# Patient Record
Sex: FEMALE | Race: WHITE | NOT HISPANIC OR LATINO | Employment: OTHER | URBAN - METROPOLITAN AREA
[De-identification: names, ages, dates, MRNs, and addresses within clinical notes are randomized per-mention and may not be internally consistent; named-entity substitution may affect disease eponyms.]

---

## 2017-01-06 ENCOUNTER — TELEPHONE (OUTPATIENT)
Dept: MEDICAL GROUP | Facility: MEDICAL CENTER | Age: 67
End: 2017-01-06

## 2017-01-07 NOTE — TELEPHONE ENCOUNTER
VOICEMAIL  1. Caller Name: Janeen                      Call Back Number: [phone]    2. Message: Patient called and is unable to to afford the Pristq. The co-pay is over $1,000. She wanted to let you know.    3. Patient approves office to leave a detailed voicemail/MyChart message: N\A

## 2017-01-09 RX ORDER — VENLAFAXINE 75 MG/1
75 TABLET ORAL 2 TIMES DAILY
Qty: 60 TAB | Refills: 6 | Status: SHIPPED | OUTPATIENT
Start: 2017-01-09 | End: 2017-03-02

## 2017-01-11 ENCOUNTER — HOSPITAL ENCOUNTER (OUTPATIENT)
Dept: RADIOLOGY | Facility: MEDICAL CENTER | Age: 67
End: 2017-01-11
Attending: INTERNAL MEDICINE
Payer: MEDICARE

## 2017-01-11 DIAGNOSIS — Z78.0 POST-MENOPAUSE: ICD-10-CM

## 2017-01-11 PROCEDURE — 77080 DXA BONE DENSITY AXIAL: CPT

## 2017-02-06 DIAGNOSIS — G10 HUNTINGTON CHOREA (HCC): Chronic | ICD-10-CM

## 2017-02-06 RX ORDER — HYDROXYZINE PAMOATE 50 MG/1
50 CAPSULE ORAL 2 TIMES DAILY
Qty: 180 CAP | Refills: 3 | Status: SHIPPED | OUTPATIENT
Start: 2017-02-06 | End: 2017-02-07

## 2017-02-07 RX ORDER — HYDROXYZINE 50 MG/1
50 TABLET, FILM COATED ORAL 3 TIMES DAILY PRN
Qty: 180 TAB | Refills: 3 | Status: SHIPPED | OUTPATIENT
Start: 2017-02-07 | End: 2017-06-30 | Stop reason: SDUPTHER

## 2017-02-09 ENCOUNTER — TELEPHONE (OUTPATIENT)
Dept: MEDICAL GROUP | Facility: MEDICAL CENTER | Age: 67
End: 2017-02-09

## 2017-02-09 NOTE — TELEPHONE ENCOUNTER
Phone Number Called: [phone] (home)     Message: patient left voice regarding a medical question but didn't state exactly    Left Message for patient to call back: yes

## 2017-02-13 ENCOUNTER — APPOINTMENT (OUTPATIENT)
Dept: BEHAVIORAL HEALTH | Facility: PHYSICIAN GROUP | Age: 67
End: 2017-02-13
Payer: MEDICARE

## 2017-02-16 ENCOUNTER — TELEPHONE (OUTPATIENT)
Dept: MEDICAL GROUP | Facility: MEDICAL CENTER | Age: 67
End: 2017-02-16

## 2017-02-16 NOTE — TELEPHONE ENCOUNTER
1. Caller Name: Janeen Ritter                                         Call Back Number: [phone] (home)       Patient approves a detailed voicemail message: yes    Pt is having a hard time swallowing atorvastatin 80 mg, she wants to know if you will change it. She also cant break or cute them they are to chalky

## 2017-03-02 ENCOUNTER — OFFICE VISIT (OUTPATIENT)
Dept: MEDICAL GROUP | Facility: MEDICAL CENTER | Age: 67
End: 2017-03-02
Payer: MEDICARE

## 2017-03-02 VITALS
HEART RATE: 87 BPM | WEIGHT: 131 LBS | RESPIRATION RATE: 16 BRPM | SYSTOLIC BLOOD PRESSURE: 132 MMHG | TEMPERATURE: 97.3 F | BODY MASS INDEX: 24.73 KG/M2 | HEIGHT: 61 IN | DIASTOLIC BLOOD PRESSURE: 68 MMHG | OXYGEN SATURATION: 97 %

## 2017-03-02 DIAGNOSIS — R73.02 IGT (IMPAIRED GLUCOSE TOLERANCE): ICD-10-CM

## 2017-03-02 DIAGNOSIS — N39.46 MIXED INCONTINENCE: ICD-10-CM

## 2017-03-02 DIAGNOSIS — R23.2 HOT FLASHES: ICD-10-CM

## 2017-03-02 PROCEDURE — 3288F FALL RISK ASSESSMENT DOCD: CPT | Performed by: INTERNAL MEDICINE

## 2017-03-02 PROCEDURE — G8420 CALC BMI NORM PARAMETERS: HCPCS | Performed by: INTERNAL MEDICINE

## 2017-03-02 PROCEDURE — 0518F FALL PLAN OF CARE DOCD: CPT | Mod: 8P | Performed by: INTERNAL MEDICINE

## 2017-03-02 PROCEDURE — G8432 DEP SCR NOT DOC, RNG: HCPCS | Performed by: INTERNAL MEDICINE

## 2017-03-02 PROCEDURE — 1100F PTFALLS ASSESS-DOCD GE2>/YR: CPT | Performed by: INTERNAL MEDICINE

## 2017-03-02 PROCEDURE — 3017F COLORECTAL CA SCREEN DOC REV: CPT | Performed by: INTERNAL MEDICINE

## 2017-03-02 PROCEDURE — 1036F TOBACCO NON-USER: CPT | Performed by: INTERNAL MEDICINE

## 2017-03-02 PROCEDURE — 99214 OFFICE O/P EST MOD 30 MIN: CPT | Performed by: INTERNAL MEDICINE

## 2017-03-02 PROCEDURE — G8482 FLU IMMUNIZE ORDER/ADMIN: HCPCS | Performed by: INTERNAL MEDICINE

## 2017-03-02 PROCEDURE — 3014F SCREEN MAMMO DOC REV: CPT | Performed by: INTERNAL MEDICINE

## 2017-03-02 PROCEDURE — 4040F PNEUMOC VAC/ADMIN/RCVD: CPT | Performed by: INTERNAL MEDICINE

## 2017-03-02 RX ORDER — ESTRADIOL 0.5 MG/1
0.5 TABLET ORAL DAILY
Qty: 30 TAB | Refills: 6 | Status: SHIPPED | OUTPATIENT
Start: 2017-03-02 | End: 2017-03-03 | Stop reason: SDUPTHER

## 2017-03-02 RX ORDER — OXYBUTYNIN CHLORIDE 5 MG/1
5 TABLET ORAL 3 TIMES DAILY
Qty: 270 TAB | Refills: 3 | Status: SHIPPED | OUTPATIENT
Start: 2017-03-02 | End: 2017-12-20

## 2017-03-02 NOTE — PROGRESS NOTES
CC: Follow-up multiple issues    HPI:   Janeen presents today with the following.    1. Hot flashes  Maintain on Jessy Garcia for some time for hot flashes with good control however insurance no longer paying for it. She was tried on multiple other agents which were either unaffordable or not helpful. She is interested in going back to Demetrice Garcia    2. Mixed incontinence  She is having more problems with incontinence. She reports the oxybutynin was helpful initially started twice a day but she is now having recurrent symptoms. She has no pain with urination but has sudden urgency to go and cannot make it to the restroom in time.    3. IGT (impaired glucose tolerance)  History of impaired glucose tolerance weight has gone up slightly not currently due for blood work.      Patient Active Problem List    Diagnosis Date Noted   • [place] chorea (CMS-HCC) 01/11/2010     Priority: High   • Chronic kidney disease, stage III (moderate) 04/16/2015     Priority: Medium   • Hot flashes 03/02/2017   • Mixed incontinence 03/02/2017   • Recurrent major depressive disorder, in partial remission (CMS-HCC) 12/30/2016   • Asthma in adult 12/30/2016   • IGT (impaired glucose tolerance) 02/22/2013   • GERD (gastroesophageal reflux disease) 01/11/2010   • Dyslipidemia 01/11/2010   • HTN (hypertension), benign 01/11/2010   • Glaucoma 01/11/2010       Current Outpatient Prescriptions   Medication Sig Dispense Refill   • estradiol (ESTRACE) 0.5 MG tablet Take 1 Tab by mouth every day. 30 Tab 6   • oxybutynin (DITROPAN) 5 MG Tab Take 1 Tab by mouth 3 times a day. 270 Tab 3   • hydrOXYzine (ATARAX) 50 MG Tab Take 1 Tab by mouth 3 times a day as needed for Itching. 180 Tab 3   • atorvastatin (LIPITOR) 80 MG tablet Take 1 Tab by mouth every evening. 90 Tab 3   • omeprazole (PRILOSEC) 40 MG delayed-release capsule Take 1 Cap by mouth every day. 90 Cap 3   • hydrocodone/acetaminophen (NORCO)  MG Tab Take 1 Tab by mouth every 12 hours  "as needed. 60 Tab 0   • gabapentin (NEURONTIN) 300 MG Cap Take 1 Cap by mouth 3 times a day. 270 Cap 3   • cyclobenzaprine (FLEXERIL) 10 MG Tab Take 1 Tab by mouth 3 times a day as needed. 90 Tab 3   • oxybutynin (DITROPAN) 5 MG Tab Take 1 Tab by mouth 2 Times a Day. TWICE DAILY 180 Tab 3   • fluticasone (FLONASE) 50 MCG/ACT nasal spray Spray 1 Spray in nose every day. 16 g 11   • trazodone (DESYREL) 50 MG Tab Take 1 Tab by mouth every bedtime. 90 Tab 3   • atorvastatin (LIPITOR) 20 MG TABS TAKE ONE TABLET BY MOUTH IN THE EVENING (Patient not taking: Reported on 12/30/2016) 90 Tab 0   • PROAIR  (90 BASE) MCG/ACT AERS inhalation aerosol Inhale 2 Puffs by mouth every 6 hours as needed for Shortness of Breath. 3 Inhaler 3   • docosahexanoic acid (OMEGA 3 FA) 1000 MG CAPS Take 1,000 mg by mouth 3 times a day, with meals.     • vitamin D, Ergocalciferol, (DRISDOL) 61801 UNIT CAPS capsule Take 1 Cap by mouth every 7 days. 12 Cap 3   • aspirin 81 MG tablet Take 81 mg by mouth every day.     • Calcium Carbonate-Vitamin D (CALTRATE 600+D PO) Take  by mouth every day.     • Latanoprost (XALATAN OP) by Ophthalmic route.       No current facility-administered medications for this visit.         Allergies as of 03/02/2017 - Claudio as Reviewed 03/02/2017   Allergen Reaction Noted   • Other misc Shortness of Breath 12/03/2010   • Soap Rash 12/03/2010        ROS: As per HPI.    /68 mmHg  Pulse 87  Temp(Src) 36.3 °C (97.3 °F)  Resp 16  Ht 1.549 m (5' 1\")  Wt 59.421 kg (131 lb)  BMI 24.76 kg/m2  SpO2 97%    Physical Exam:  Gen:         Alert and oriented, No apparent distress.  Neck:        No Lymphadenopathy or Bruits.  Lungs:     Clear to auscultation bilaterally  CV:          Regular rate and rhythm. No murmurs, rubs or gallops.               Ext:          No clubbing, cyanosis, edema.      Assessment and Plan.   66 y.o. female with the following issues.    1. Hot flashes  Have written for estrogenic at low dose " we'll try and get prior authorization as her hot flashes are severely life limiting.    2. Mixed incontinence  Increasing oxybutynin to 3 times a day if not improving symptoms will refer to urology.  - oxybutynin (DITROPAN) 5 MG Tab; Take 1 Tab by mouth 3 times a day.  Dispense: 270 Tab; Refill: 3    3. IGT (impaired glucose tolerance)  Discussed diet exercise and recheck blood work in 3 months.

## 2017-03-02 NOTE — MR AVS SNAPSHOT
"        Janeen Ritter   3/2/2017 3:00 PM   Office Visit   MRN: [id number]    Department:  [address] [place] [address]   Dept Phone:  [phone]    Description:  Female : 1950   Provider:  Nico Garcia M.D.           Reason for Visit     Follow-Up Labs      Allergies as of 3/2/2017     Allergen Noted Reactions    Other Misc 2010   Shortness of Breath    \"chemicals such as cleaning agents and fragrance\"    Soap 2010   Rash      You were diagnosed with     Hot flashes   [732628]       Mixed incontinence   [880302]       IGT (impaired glucose tolerance)   [799814]         Vital Signs     Blood Pressure Pulse Temperature Respirations Height Weight    132/68 mmHg 87 36.3 °C (97.3 °F) 16 1.549 m (5' 1\") 59.421 kg (131 lb)    Body Mass Index Oxygen Saturation Smoking Status             24.76 kg/m2 97% Never Smoker          Basic Information     Date Of Birth Sex Race Ethnicity Preferred Language    1950 Female White Non- English      Your appointments     2017  3:20 PM   Established Patient with Nico Garcia M.D.   [facility] [address]    [address]  [address]  [facility] [id number] [phone]           You will be receiving a confirmation call a few days before your appointment from our automated call confirmation system.              Problem List              ICD-10-CM Priority Class Noted - Resolved    GERD (gastroesophageal reflux disease) (Chronic) K21.9   2010 - Present    Dyslipidemia (Chronic) E78.5   2010 - Present    HTN (hypertension), benign (Chronic) I10   2010 - Present    Vale chorea (CMS-HCC) (Chronic) G10 High  2010 - Present    Glaucoma H40.9   2010 - Present    IGT (impaired glucose tolerance) R73.02   2013 - Present    Chronic kidney disease, stage III (moderate) N18.3 Medium  2015 - Present    Recurrent major depressive disorder, in partial remission (CMS-HCC) F33.41   2016 - " Present    Asthma in adult J45.909   12/30/2016 - Present    Hot flashes R23.2   3/2/2017 - Present    Mixed incontinence N39.46   3/2/2017 - Present      Health Maintenance        Date Due Completion Dates    IMM ZOSTER VACCINE 7/18/2011 ---    IMM PNEUMOCOCCAL 65+ (ADULT) LOW/MEDIUM RISK SERIES (1 of 2 - PCV13) 4/6/2017 4/6/2016    MAMMOGRAM 4/15/2017 4/15/2016, 1/3/2014, 11/8/2011, 11/8/2011 (Done), 1/18/2010, 1/18/2010    Override on 11/8/2011: Done    COLONOSCOPY 10/1/2017 10/1/2012 (Done)    Override on 10/1/2012: Done    BONE DENSITY 1/11/2022 1/11/2017, 11/8/2011    IMM DTaP/Tdap/Td Vaccine (3 - Td) 11/2/2026 11/2/2016, 8/27/2012            Current Immunizations     INFLUENZA VACCINE H1N1 10/27/2009    Influenza TIV (IM) 10/27/2011, 10/22/2007, 12/5/2006, 10/24/2005    Influenza Vaccine Adult HD 11/2/2016    Influenza Vaccine Quad Inj (Pf) 10/3/2014, 10/7/2013, 9/26/2011, 10/6/2010    Influenza Vaccine Quad Inj (Preserved) 10/7/2015  4:00 PM, 10/3/2014    Pneumococcal polysaccharide vaccine (PPSV-23) 4/6/2016    Tdap Vaccine 11/2/2016, 8/27/2012  1:55 PM      Below and/or attached are the medications your provider expects you to take. Review all of your home medications and newly ordered medications with your provider and/or pharmacist. Follow medication instructions as directed by your provider and/or pharmacist. Please keep your medication list with you and share with your provider. Update the information when medications are discontinued, doses are changed, or new medications (including over-the-counter products) are added; and carry medication information at all times in the event of emergency situations     Allergies:  OTHER MISC - Shortness of Breath     SOAP - Rash               Medications  Valid as of: March 02, 2017 -  3:39 PM    Generic Name Brand Name Tablet Size Instructions for use    Albuterol Sulfate (Aero Soln) PROAIR  (90 BASE) MCG/ACT Inhale 2 Puffs by mouth every 6 hours as needed  for Shortness of Breath.        Aspirin (Tab) aspirin 81 MG Take 81 mg by mouth every day.        Atorvastatin Calcium (Tab) LIPITOR 20 MG TAKE ONE TABLET BY MOUTH IN THE EVENING        Atorvastatin Calcium (Tab) LIPITOR 80 MG Take 1 Tab by mouth every evening.        Calcium Carbonate-Vitamin D   Take  by mouth every day.        Cyclobenzaprine HCl (Tab) FLEXERIL 10 MG Take 1 Tab by mouth 3 times a day as needed.        Ergocalciferol (Cap) DRISDOL 80730 UNITS Take 1 Cap by mouth every 7 days.        Estradiol (Tab) ESTRACE 0.5 MG Take 1 Tab by mouth every day.        Fluticasone Propionate (Suspension) FLONASE 50 MCG/ACT Spray 1 Spray in nose every day.        Gabapentin (Cap) NEURONTIN 300 MG Take 1 Cap by mouth 3 times a day.        Hydrocodone-Acetaminophen (Tab) NORCO  MG Take 1 Tab by mouth every 12 hours as needed.        HydrOXYzine HCl (Tab) ATARAX 50 MG Take 1 Tab by mouth 3 times a day as needed for Itching.        Latanoprost   by Ophthalmic route.        Omega-3 Fatty Acids (Cap) OMEGA 3 FA 1000 MG Take 1,000 mg by mouth 3 times a day, with meals.        Omeprazole (CAPSULE DELAYED RELEASE) PRILOSEC 40 MG Take 1 Cap by mouth every day.        Oxybutynin Chloride (Tab) DITROPAN 5 MG Take 1 Tab by mouth 2 Times a Day. TWICE DAILY        Oxybutynin Chloride (Tab) DITROPAN 5 MG Take 1 Tab by mouth 3 times a day.        TraZODone HCl (Tab) DESYREL 50 MG Take 1 Tab by mouth every bedtime.        .                 Medicines prescribed today were sent to:     [facility] [address] [place] [address] [address]     [address] [facility] [address]    Phone: [phone] Fax: [phone]    Open 24 Hours?: No      Medication refill instructions:       If your prescription bottle indicates you have medication refills left, it is not necessary to call your provider’s office. Please contact your pharmacy and they will refill your medication.    If your prescription bottle indicates you do not have any refills left,  you may request refills at any time through one of the following ways: The online Catbird system (except Urgent Care), by calling your provider’s office, or by asking your pharmacy to contact your provider’s office with a refill request. Medication refills are processed only during regular business hours and may not be available until the next business day. Your provider may request additional information or to have a follow-up visit with you prior to refilling your medication.   *Please Note: Medication refills are assigned a new Rx number when refilled electronically. Your pharmacy may indicate that no refills were authorized even though a new prescription for the same medication is available at the pharmacy. Please request the medicine by name with the pharmacy before contacting your provider for a refill.        Other Notes About Your Plan     Patient Enrolled in [facility] with Dr.John Garcia.            Catbird Access Code: [email or web address]  Expires: 4/1/2017  3:39 PM    Catbird  A secure, online tool to manage your health information     [employer]’s Catbird® is a secure, online tool that connects you to your personalized health information from the privacy of your home -- day or night - making it very easy for you to manage your healthcare. Once the activation process is completed, you can even access your medical information using the Catbird aidee, which is available for free in the Apple Aidee store or Google Play store.     Catbird provides the following levels of access (as shown below):   My Chart Features   [facility] Primary Care Doctor [facility]  Specialists [facility]  Urgent  Care Non-[facility]  Primary Care  Doctor   Email your healthcare team securely and privately 24/7 X X X    Manage appointments: schedule your next appointment; view details of past/upcoming appointments X      Request prescription refills. X      View recent personal medical records, including lab and immunizations X X X X   View  health record, including health history, allergies, medications X X X X   Read reports about your outpatient visits, procedures, consult and ER notes X X X X   See your discharge summary, which is a recap of your hospital and/or ER visit that includes your diagnosis, lab results, and care plan. X X       How to register for Incentive Targeting:  1. Go to  [email or web address].  2. Click on the Sign Up Now box, which takes you to the New Member Sign Up page. You will need to provide the following information:  a. Enter your Incentive Targeting Access Code exactly as it appears at the top of this page. (You will not need to use this code after you’ve completed the sign-up process. If you do not sign up before the expiration date, you must request a new code.)   b. Enter your date of birth.   c. Enter your home email address.   d. Click Submit, and follow the next screen’s instructions.  3. Create a Incentive Targeting ID. This will be your Incentive Targeting login ID and cannot be changed, so think of one that is secure and easy to remember.  4. Create a [email or web address] password. You can change your password at any time.  5. Enter your Password Reset Question and Answer. This can be used at a later time if you forget your password.   6. Enter your e-mail address. This allows you to receive e-mail notifications when new information is available in Incentive Targeting.  7. Click Sign Up. You can now view your health information.    For assistance activating your Incentive Targeting account, call [phone]

## 2017-03-03 RX ORDER — ESTRADIOL 0.5 MG/1
0.5 TABLET ORAL DAILY
Qty: 30 TAB | Refills: 6 | Status: SHIPPED | OUTPATIENT
Start: 2017-03-03 | End: 2017-12-20 | Stop reason: SDUPTHER

## 2017-03-20 ENCOUNTER — TELEPHONE (OUTPATIENT)
Dept: MEDICAL GROUP | Facility: MEDICAL CENTER | Age: 67
End: 2017-03-20

## 2017-03-20 NOTE — TELEPHONE ENCOUNTER
1. Caller Name: Janeen Ritter                                           Call Back Number: [phone] (home)         Patient approves a detailed voicemail message: yes    Pt wants to stop Venlafaxine all together she is down to 1/2 75 mg a day.  Should she take 1/2 every other day for a week and stop.

## 2017-05-24 ENCOUNTER — TELEPHONE (OUTPATIENT)
Dept: MEDICAL GROUP | Facility: MEDICAL CENTER | Age: 67
End: 2017-05-24

## 2017-05-24 NOTE — TELEPHONE ENCOUNTER
Future Appointments       Provider Department Center    6/2/2017 3:20 PM Nico Garcia M.D. [facility] [address]        ESTABLISHED PATIENT PRE-VISIT PLANNING     Note: Patient will not be contacted if there is no indication to call.     1.  Reviewed note from last office visit with PCP and/or other med group provider: Yes    2.  If any orders were placed at last visit, do we have Results/Consult Notes?        •  Labs - Labs were not ordered at last office visit.       •  Imaging - Imaging was not ordered at last office visit.       •  Referrals - No referrals were ordered at last office visit.    3.  Immunizations were updated in [facility] using WebIZ?: Yes       •  Web Iz Recommendations: HEPATITIS A  HEPATITIS B PREVNAR (PCV13)  TD VARICELLA (Chicken Pox)  ZOSTAVAX (Shingles)    4.  Patient is due for the following Health Maintenance Topics:   Health Maintenance   Topic Date Due   • Zoster (Shingles) Vaccine  07/18/2011   • Pneumonia Vaccine (1 of 2 - PCV13) 04/06/2017   • Mammogram  04/15/2017   • Colonoscopy  10/01/2017   • Annual Wellness Visit  12/31/2017   • Bone Density Study (DEXA)  01/11/2022   • Diphtheria, Tetanus, Pertussis Immunization (DTaP/Tdap/Td) (3 - Td) 11/02/2026   • Flu Shot  Completed           5.  Patient was not informed to arrive 15 min prior to their scheduled appointment and bring in their medication bottles.

## 2017-06-02 ENCOUNTER — OFFICE VISIT (OUTPATIENT)
Dept: MEDICAL GROUP | Facility: MEDICAL CENTER | Age: 67
End: 2017-06-02
Payer: MEDICARE

## 2017-06-02 VITALS
TEMPERATURE: 97.3 F | HEIGHT: 61 IN | OXYGEN SATURATION: 92 % | BODY MASS INDEX: 23.6 KG/M2 | SYSTOLIC BLOOD PRESSURE: 136 MMHG | RESPIRATION RATE: 16 BRPM | HEART RATE: 82 BPM | WEIGHT: 125 LBS | DIASTOLIC BLOOD PRESSURE: 78 MMHG

## 2017-06-02 DIAGNOSIS — E78.5 DYSLIPIDEMIA: Chronic | ICD-10-CM

## 2017-06-02 DIAGNOSIS — Z12.31 VISIT FOR SCREENING MAMMOGRAM: ICD-10-CM

## 2017-06-02 DIAGNOSIS — F41.9 ANXIETY: ICD-10-CM

## 2017-06-02 DIAGNOSIS — Z23 NEED FOR PNEUMOCOCCAL VACCINE: ICD-10-CM

## 2017-06-02 DIAGNOSIS — I10 HTN (HYPERTENSION), BENIGN: Chronic | ICD-10-CM

## 2017-06-02 DIAGNOSIS — R49.0 HOARSE VOICE QUALITY: ICD-10-CM

## 2017-06-02 DIAGNOSIS — G89.29 CHRONIC LEFT-SIDED LOW BACK PAIN WITH LEFT-SIDED SCIATICA: ICD-10-CM

## 2017-06-02 DIAGNOSIS — R73.02 IGT (IMPAIRED GLUCOSE TOLERANCE): ICD-10-CM

## 2017-06-02 DIAGNOSIS — M54.42 CHRONIC LEFT-SIDED LOW BACK PAIN WITH LEFT-SIDED SCIATICA: ICD-10-CM

## 2017-06-02 PROCEDURE — 90670 PCV13 VACCINE IM: CPT | Performed by: INTERNAL MEDICINE

## 2017-06-02 PROCEDURE — G0009 ADMIN PNEUMOCOCCAL VACCINE: HCPCS | Performed by: INTERNAL MEDICINE

## 2017-06-02 PROCEDURE — G8432 DEP SCR NOT DOC, RNG: HCPCS | Performed by: INTERNAL MEDICINE

## 2017-06-02 PROCEDURE — 0518F FALL PLAN OF CARE DOCD: CPT | Mod: 8P | Performed by: INTERNAL MEDICINE

## 2017-06-02 PROCEDURE — 3014F SCREEN MAMMO DOC REV: CPT | Performed by: INTERNAL MEDICINE

## 2017-06-02 PROCEDURE — 1036F TOBACCO NON-USER: CPT | Performed by: INTERNAL MEDICINE

## 2017-06-02 PROCEDURE — 3288F FALL RISK ASSESSMENT DOCD: CPT | Performed by: INTERNAL MEDICINE

## 2017-06-02 PROCEDURE — 1100F PTFALLS ASSESS-DOCD GE2>/YR: CPT | Performed by: INTERNAL MEDICINE

## 2017-06-02 PROCEDURE — 99214 OFFICE O/P EST MOD 30 MIN: CPT | Mod: 25 | Performed by: INTERNAL MEDICINE

## 2017-06-02 PROCEDURE — 4040F PNEUMOC VAC/ADMIN/RCVD: CPT | Performed by: INTERNAL MEDICINE

## 2017-06-02 PROCEDURE — G8420 CALC BMI NORM PARAMETERS: HCPCS | Performed by: INTERNAL MEDICINE

## 2017-06-02 RX ORDER — SERTRALINE HYDROCHLORIDE 25 MG/1
25 TABLET, FILM COATED ORAL DAILY
Qty: 30 TAB | Refills: 11 | Status: SHIPPED | OUTPATIENT
Start: 2017-06-02 | End: 2017-11-15 | Stop reason: SDUPTHER

## 2017-06-02 NOTE — PROGRESS NOTES
CC: Follow-up multiple issues    HPI:   Janeen presents today with the following.    1. Hoarse voice quality  Presents complaining of persistent hoarse voice. She is being treated for acid reflux which she is reporting that is well controlled as well as allergies. She does not remember to take herconsistently however does have some issues. Been going on for several months and there is no other infectious symptom already.    2. Dyslipidemia  LDL significantly elevated 180 despite statin she is taking it more regularly at the full dose due for recheck.    3. IGT (impaired glucose tolerance)  Blood sugars well controlled as of last check. She is coming due for repeat A1c.    4. HTN (hypertension), benign  Blood pressure well controlled denying chest pain or shortness of breath.    5. Anxiety  She was tried on Effexor to try and help with hot flashes and anxiety reporting some spaciness with the Effexor. She is almost off the medication completely would like to go on Zoloft which is taken before in the past.    6. Need for pneumococcal vaccine      7. Visit for screening mammogram      8. Chronic left-sided low back pain with left-sided sciatica  She is complaining of recurrence of low back pain. She had received injections by physiatry in the past like to return. She still has 20 more cold left that she was given last September 60 pills. She denies any loss of bowel or bladder no weakness or lower extremity.      Patient Active Problem List    Diagnosis Date Noted   • [place] chorea (CMS-HCC) 01/11/2010     Priority: High   • Chronic kidney disease, stage III (moderate) 04/16/2015     Priority: Medium   • Hot flashes 03/02/2017   • Mixed incontinence 03/02/2017   • Recurrent major depressive disorder, in partial remission (CMS-HCC) 12/30/2016   • Asthma in adult 12/30/2016   • IGT (impaired glucose tolerance) 02/22/2013   • GERD (gastroesophageal reflux disease) 01/11/2010   • Dyslipidemia 01/11/2010   • HTN  (hypertension), benign 01/11/2010   • Glaucoma 01/11/2010       Current Outpatient Prescriptions   Medication Sig Dispense Refill   • sertraline (ZOLOFT) 25 MG tablet Take 1 Tab by mouth every day. 30 Tab 11   • estradiol (ESTRACE) 0.5 MG tablet Take 1 Tab by mouth every day. 30 Tab 6   • oxybutynin (DITROPAN) 5 MG Tab Take 1 Tab by mouth 3 times a day. 270 Tab 3   • hydrOXYzine (ATARAX) 50 MG Tab Take 1 Tab by mouth 3 times a day as needed for Itching. 180 Tab 3   • atorvastatin (LIPITOR) 80 MG tablet Take 1 Tab by mouth every evening. 90 Tab 3   • omeprazole (PRILOSEC) 40 MG delayed-release capsule Take 1 Cap by mouth every day. 90 Cap 3   • gabapentin (NEURONTIN) 300 MG Cap Take 1 Cap by mouth 3 times a day. 270 Cap 3   • cyclobenzaprine (FLEXERIL) 10 MG Tab Take 1 Tab by mouth 3 times a day as needed. 90 Tab 3   • oxybutynin (DITROPAN) 5 MG Tab Take 1 Tab by mouth 2 Times a Day. TWICE DAILY 180 Tab 3   • fluticasone (FLONASE) 50 MCG/ACT nasal spray Spray 1 Spray in nose every day. 16 g 11   • trazodone (DESYREL) 50 MG Tab Take 1 Tab by mouth every bedtime. 90 Tab 3   • atorvastatin (LIPITOR) 20 MG TABS TAKE ONE TABLET BY MOUTH IN THE EVENING (Patient not taking: Reported on 12/30/2016) 90 Tab 0   • PROAIR  (90 BASE) MCG/ACT AERS inhalation aerosol Inhale 2 Puffs by mouth every 6 hours as needed for Shortness of Breath. 3 Inhaler 3   • docosahexanoic acid (OMEGA 3 FA) 1000 MG CAPS Take 1,000 mg by mouth 3 times a day, with meals.     • vitamin D, Ergocalciferol, (DRISDOL) 10420 UNIT CAPS capsule Take 1 Cap by mouth every 7 days. 12 Cap 3   • aspirin 81 MG tablet Take 81 mg by mouth every day.     • Calcium Carbonate-Vitamin D (CALTRATE 600+D PO) Take  by mouth every day.     • Latanoprost (XALATAN OP) by Ophthalmic route.       No current facility-administered medications for this visit.         Allergies as of 06/02/2017 - Claudio as Reviewed 06/02/2017   Allergen Reaction Noted   • Other misc Shortness of  "Breath 12/03/2010   • Soap Rash 12/03/2010        ROS: As per HPI.    /78 mmHg  Pulse 82  Temp(Src) 36.3 °C (97.3 °F)  Resp 16  Ht 1.549 m (5' 1\")  Wt 56.7 kg (125 lb)  BMI 23.63 kg/m2  SpO2 92%    Physical Exam:  Gen:         Alert and oriented, No apparent distress.  Heent:       TMs clear bilaterally, oropharynx without erythema or exudates  Neck:        No Lymphadenopathy or Bruits.  Lungs:     Clear to auscultation bilaterally  CV:          Regular rate and rhythm. No murmurs, rubs or gallops.               Ext:          No clubbing, cyanosis, edema.      Assessment and Plan.   66 y.o. female with the following issues.    1. Hoarse voice quality  Have referred to ENT given the persistence of symptoms and slight worsening.  - REFERRAL TO ENT    2. Dyslipidemia  Recheck cholesterol  - COMP METABOLIC PANEL; Future  - LIPID PROFILE; Future    3. IGT (impaired glucose tolerance)  Recheck blood sugar  - HEMOGLOBIN A1C; Future    4. HTN (hypertension), benign  Currently well controlled, Discuss diet, exercise and salt restriction.    5. Anxiety  We'll discontinue Effexor transitioning to low-dose Zoloft will see back in 4-8 weeks.  - sertraline (ZOLOFT) 25 MG tablet; Take 1 Tab by mouth every day.  Dispense: 30 Tab; Refill: 11    6. Need for pneumococcal vaccine    - PNEUMOCOCCAL CONJUGATE VACCINE 13-VALENT    7. Visit for screening mammogram    - MA-SCREEN MAMMO W/CAD-BILAT; Future    8. Chronic left-sided low back pain with left-sided sciatica  Referral back to physiatry for possible injections.  - REFERRAL TO PAIN CLINIC        "

## 2017-06-30 RX ORDER — FLUTICASONE PROPIONATE 50 MCG
1 SPRAY, SUSPENSION (ML) NASAL DAILY
Qty: 16 G | Refills: 11 | Status: SHIPPED | OUTPATIENT
Start: 2017-06-30 | End: 2019-07-12

## 2017-06-30 RX ORDER — OXYBUTYNIN CHLORIDE 5 MG/1
TABLET ORAL
Qty: 180 TAB | Refills: 3 | Status: SHIPPED | OUTPATIENT
Start: 2017-06-30 | End: 2017-12-20 | Stop reason: SDUPTHER

## 2017-06-30 RX ORDER — HYDROXYZINE 50 MG/1
50 TABLET, FILM COATED ORAL 3 TIMES DAILY PRN
Qty: 180 TAB | Refills: 3 | Status: SHIPPED | OUTPATIENT
Start: 2017-06-30 | End: 2017-12-20

## 2017-06-30 RX ORDER — TRAZODONE HYDROCHLORIDE 50 MG/1
TABLET ORAL
Qty: 90 TAB | Refills: 3 | Status: SHIPPED | OUTPATIENT
Start: 2017-06-30 | End: 2017-12-20 | Stop reason: SDUPTHER

## 2017-06-30 RX ORDER — NITROFURANTOIN 25; 75 MG/1; MG/1
CAPSULE ORAL
Qty: 10 CAP | Refills: 3 | Status: SHIPPED | OUTPATIENT
Start: 2017-06-30 | End: 2017-12-20

## 2017-07-07 ENCOUNTER — TELEPHONE (OUTPATIENT)
Dept: MEDICAL GROUP | Facility: MEDICAL CENTER | Age: 67
End: 2017-07-07

## 2017-07-07 NOTE — TELEPHONE ENCOUNTER
Future Appointments       Provider Department Center    7/14/2017 4:00 PM Nico Garcia M.D. [facility] [address]        ESTABLISHED PATIENT PRE-VISIT PLANNING     Note: Patient will not be contacted if there is no indication to call.     1.  Reviewed note from last office visit with PCP and/or other med group provider: Yes    2.  If any orders were placed at last visit, do we have Results/Consult Notes?        •  Labs - Labs ordered, NOT completed. Patient advised to complete prior to next appointment.       •  Imaging - Imaging ordered, NOT completed. Patient advised to complete prior to next appointment.VIA VOICEMAIL       •  Referrals - No referrals were ordered at last office visit.    3.  Immunizations were updated in Epic using WebIZ?: Yes       •  Web Iz Recommendations: HEPATITIS A  TD ZOSTAVAX (Shingles)    4.  Patient is due for the following Health Maintenance Topics:   Health Maintenance Due   Topic Date Due   • IMM ZOSTER VACCINE  07/18/2011   • MAMMOGRAM  04/15/2017           5.  Patient was informed VIA VOICEMAIL to arrive 15 min prior to their scheduled appointment and bring in their medication bottles.

## 2017-07-20 ENCOUNTER — TELEPHONE (OUTPATIENT)
Dept: MEDICAL GROUP | Facility: MEDICAL CENTER | Age: 67
End: 2017-07-20

## 2017-07-20 NOTE — Clinical Note
[facility]  Nico Garcia M.D.  [address]  [place] [address]  Fax: [phone]   Authorization for Release/Disclosure of   Protected Health Information   Name: MAMIE PANDA : 1950 SSN: [id number]   Address: [address] [address] Phone:    [phone] (home)    I authorize the entity listed below to release/disclose the PHI below to:   [facility]/Nico Garcia M.D. and Nico Garcia M.D.   Provider or Entity Name: Peace Vail M.D.   Address   [facility], [facility], [facility]   Phone:    Fax: [phone]   Reason for request: continuity of care   Information to be released:    [  ] LAST COLONOSCOPY,  including any PATH REPORT and follow-up  [  ] LAST FIT/COLOGUARD RESULT [  ] LAST DEXA  [  ] LAST MAMMOGRAM  [  ] LAST PAP  [  ] LAST LABS [x] RETINA EXAM REPORT  [  ] IMMUNIZATION RECORDS  [  ] Release all info      [  ] Check here and initial the line next to each item to release ALL health information INCLUDING  _____ Care and treatment for drug and / or alcohol abuse  _____ HIV testing, infection status, or AIDS  _____ Genetic Testing    DATES OF SERVICE OR TIME PERIOD TO BE DISCLOSED: _____________  I understand and acknowledge that:  * This Authorization may be revoked at any time by you in writing, except if your health information has already been used or disclosed.  * Your health information that will be used or disclosed as a result of you signing this authorization could be re-disclosed by the recipient. If this occurs, your re-disclosed health information may no longer be protected by State or Federal laws.  * You may refuse to sign this Authorization. Your refusal will not affect your ability to obtain treatment.  * This Authorization becomes effective upon signing and will  on (date) __________.      If no date is indicated, this Authorization will  one (1) year from the signature date.    Name: Mamie Panda    Signature:        Date: 7/25/2017       PLEASE FAX REQUESTED RECORDS BACK TO: [phone]

## 2017-07-20 NOTE — TELEPHONE ENCOUNTER
Future Appointments       Provider Department Center    7/28/2017 2:00 PM Nico Garcia M.D.; [facility] PEX Card  [facility] [address]    8/4/2017 11:10 AM [facility] [place] [address] [place] [address]          ANNUAL WELLNESS VISIT PRE-VISIT PLANNING     1.  Reviewed note from last office visit with PCP: YES Last office visit: 06/02/17    2.  If any orders were placed at last visit, do we have Results/Consult Notes?        •  Labs - Labs ordered, NOT completed. Patient advised to complete prior to next appointment.        •  Imaging - Imaging ordered, NOT completed. Patient advised to complete prior to next appointment. SCHEDULED       •  Referrals - Referral ordered, patient has NOT been seen.     3.  Immunizations were updated in [employer]([facility] using WebIZ?: Yes       •  WebIZ Recommendations: HEPATITIS A  and ZOSTAVAX (Shingles)       •  Is patient due for Tdap? NO       •  Is patient due for Shingles? YES. Patient was notified of copay.     4.  Patient is due for the following Health Maintenance Topics:   Health Maintenance Due   Topic Date Due   • IMM ZOSTER VACCINE  DUE DURING VISIT   • MAMMOGRAM  04/15/2017       5.  Reviewed/Updated the following with patient:       •   Preferred Pharmacy? YES       •   Preferred Lab? YES       •   Medications? YES. Was Abstract Encounter opened and chart updated? YES       •   Social History? YES. Was Abstract Encounter opened and chart updated? YES       •   Family History? YES. Was Abstract Encounter opened and chart updated? YES    6.  Care Team Updated:       •   DME Company (gait device, O2, CPAP, etc.): YES       •   Other Specialists (eye doctor, derm, GYN, cardiology, endo, etc): YES       •   Last eye exam: 12/2016    7. DPA/Advanced Directive:  Patient does not have an Advanced Directive.  A packet and workshop information was given on Advanced Directives.    8.  Patient has the following Care Path diagnoses on Problem  List:  COPD    1.  Is patient under the care of a pulmonologist? No.  2.  Has patient ever completed a PFT or spirometry? No.  3.  Is patient on oxygen or CPAP? No.  4.  Has patient ever had instruction on inhaler technique by health care professional? No  5.  Is patient interested in a referral to respiratory therapy for more information on COPD, inhaler technique, and/or information on establishing an action plan?  Yes, and patient is interested in more. Referral pended.      9.  Specialty Comments was updated with diagnosis information provided by [facility]: YES there is a note    10.  Patient was advised: “This is a free wellness visit. The provider will screen for medical conditions to help you stay healthy. If you have other concerns to address you may be asked to discuss these at a separate visit or there may be an additional fee.”     11.  Patient was informed to arrive 15 min prior to their scheduled appointment and bring in their medication bottles?  YES       Pt needs a letter from Dr. Garcia stating that he has requested her to take all the medication on her med list including OTC

## 2017-07-20 NOTE — Clinical Note
Request for Medical Records    Patient Name: Janeen Ritter    : 1950      Dear Doctor: Peace Vail,     The above named patient receives primary care at the [facility] by Nico Garcia M.D..  The patient informs us that you are her eye care Provider.    Please fax a copy of the most recent eye exam to [phone] or answer the  questions below and fax this sheet back to us at the above number.  Attached is a signed Release of Information.      Date of last eye exam: _____________    Retinal eye exam summary:        Please select the choice(s) that apply.    ____ No diabetic retinopathy    ____    Diabetic retinopathy present      Printed Name and Credentials: __________________________________    Signature of Eye Care Provider: _________________________________    We appreciate your assistance and collaboration in providing efficient patient care!    Kindest Regards,    [facility] [address]  [address] [facility]  [place] [address] [phone]

## 2017-07-21 RX ORDER — HYDROCODONE BITARTRATE AND ACETAMINOPHEN 10; 325 MG/1; MG/1
1 TABLET ORAL EVERY 8 HOURS PRN
COMMUNITY
End: 2017-12-20

## 2017-07-21 RX ORDER — DIPHENHYDRAMINE HCL 25 MG
25 TABLET ORAL EVERY 6 HOURS PRN
COMMUNITY
End: 2017-12-20

## 2017-07-21 RX ORDER — GLUCOSAMINE HCL 500 MG
TABLET ORAL
COMMUNITY
End: 2021-01-26

## 2017-07-25 ENCOUNTER — APPOINTMENT (OUTPATIENT)
Dept: MEDICAL GROUP | Facility: MEDICAL CENTER | Age: 67
End: 2017-07-25
Payer: MEDICARE

## 2017-07-31 ENCOUNTER — HOSPITAL ENCOUNTER (OUTPATIENT)
Dept: LAB | Facility: MEDICAL CENTER | Age: 67
End: 2017-07-31
Attending: INTERNAL MEDICINE
Payer: MEDICARE

## 2017-07-31 DIAGNOSIS — R73.02 IGT (IMPAIRED GLUCOSE TOLERANCE): ICD-10-CM

## 2017-07-31 DIAGNOSIS — E78.5 DYSLIPIDEMIA: Chronic | ICD-10-CM

## 2017-07-31 LAB
ALBUMIN SERPL BCP-MCNC: 4.2 G/DL (ref 3.2–4.9)
ALBUMIN/GLOB SERPL: 1.3 G/DL
ALP SERPL-CCNC: 58 U/L (ref 30–99)
ALT SERPL-CCNC: 13 U/L (ref 2–50)
ANION GAP SERPL CALC-SCNC: 7 MMOL/L (ref 0–11.9)
AST SERPL-CCNC: 20 U/L (ref 12–45)
BILIRUB SERPL-MCNC: 0.5 MG/DL (ref 0.1–1.5)
BUN SERPL-MCNC: 18 MG/DL (ref 8–22)
CALCIUM SERPL-MCNC: 9.2 MG/DL (ref 8.5–10.5)
CHLORIDE SERPL-SCNC: 105 MMOL/L (ref 96–112)
CHOLEST SERPL-MCNC: 154 MG/DL (ref 100–199)
CO2 SERPL-SCNC: 25 MMOL/L (ref 20–33)
CREAT SERPL-MCNC: 0.9 MG/DL (ref 0.5–1.4)
EST. AVERAGE GLUCOSE BLD GHB EST-MCNC: 123 MG/DL
GFR SERPL CREATININE-BSD FRML MDRD: >60 ML/MIN/1.73 M 2
GLOBULIN SER CALC-MCNC: 3.2 G/DL (ref 1.9–3.5)
GLUCOSE SERPL-MCNC: 96 MG/DL (ref 65–99)
HBA1C MFR BLD: 5.9 % (ref 0–5.6)
HDLC SERPL-MCNC: 43 MG/DL
LDLC SERPL CALC-MCNC: 62 MG/DL
POTASSIUM SERPL-SCNC: 3.9 MMOL/L (ref 3.6–5.5)
PROT SERPL-MCNC: 7.4 G/DL (ref 6–8.2)
SODIUM SERPL-SCNC: 137 MMOL/L (ref 135–145)
TRIGL SERPL-MCNC: 244 MG/DL (ref 0–149)

## 2017-07-31 PROCEDURE — 83036 HEMOGLOBIN GLYCOSYLATED A1C: CPT

## 2017-07-31 PROCEDURE — 80053 COMPREHEN METABOLIC PANEL: CPT

## 2017-07-31 PROCEDURE — 36415 COLL VENOUS BLD VENIPUNCTURE: CPT

## 2017-07-31 PROCEDURE — 80061 LIPID PANEL: CPT

## 2017-10-02 DIAGNOSIS — M25.50 ARTHRALGIA, UNSPECIFIED JOINT: ICD-10-CM

## 2017-10-02 RX ORDER — CYCLOBENZAPRINE HCL 10 MG
10 TABLET ORAL 3 TIMES DAILY PRN
Qty: 90 TAB | Refills: 1 | Status: SHIPPED | OUTPATIENT
Start: 2017-10-02 | End: 2017-10-12 | Stop reason: SDUPTHER

## 2017-10-12 DIAGNOSIS — M25.50 ARTHRALGIA, UNSPECIFIED JOINT: ICD-10-CM

## 2017-10-12 RX ORDER — CYCLOBENZAPRINE HCL 10 MG
10 TABLET ORAL 3 TIMES DAILY PRN
Qty: 90 TAB | Refills: 1 | Status: SHIPPED | OUTPATIENT
Start: 2017-10-12 | End: 2017-12-06 | Stop reason: SDUPTHER

## 2017-11-03 DIAGNOSIS — K21.9 GASTROESOPHAGEAL REFLUX DISEASE WITHOUT ESOPHAGITIS: Chronic | ICD-10-CM

## 2017-11-03 DIAGNOSIS — G10 HUNTINGTON CHOREA (HCC): Chronic | ICD-10-CM

## 2017-11-06 RX ORDER — HYDROXYZINE PAMOATE 50 MG/1
CAPSULE ORAL
Qty: 30 CAP | Refills: 11 | Status: SHIPPED | OUTPATIENT
Start: 2017-11-06 | End: 2017-12-20 | Stop reason: SDUPTHER

## 2017-11-06 RX ORDER — OMEPRAZOLE 40 MG/1
CAPSULE, DELAYED RELEASE ORAL
Qty: 90 CAP | Refills: 3 | Status: SHIPPED | OUTPATIENT
Start: 2017-11-06 | End: 2017-12-20 | Stop reason: SDUPTHER

## 2017-11-15 ENCOUNTER — PATIENT OUTREACH (OUTPATIENT)
Dept: HEALTH INFORMATION MANAGEMENT | Facility: OTHER | Age: 67
End: 2017-11-15

## 2017-11-15 DIAGNOSIS — F41.9 ANXIETY: ICD-10-CM

## 2017-11-15 RX ORDER — SERTRALINE HYDROCHLORIDE 25 MG/1
25 TABLET, FILM COATED ORAL DAILY
Qty: 30 TAB | Refills: 11 | Status: SHIPPED | OUTPATIENT
Start: 2017-11-15 | End: 2017-11-20 | Stop reason: SDUPTHER

## 2017-11-15 NOTE — PROGRESS NOTES
Outcome: Left Message    Please transfer to Patient Outreach Team at [phone] when patient returns call.    WebIZ Checked & Epic Updated:  yes    HealthConnect Verified: yes    Attempt # 1

## 2017-11-16 NOTE — PROGRESS NOTES
Outcome: no answer / phone went busy     Please transfer to Patient Outreach Team at [phone] when patient returns call.    Attempt # 2

## 2017-11-17 ENCOUNTER — TELEPHONE (OUTPATIENT)
Dept: MEDICAL GROUP | Facility: MEDICAL CENTER | Age: 67
End: 2017-11-17

## 2017-11-17 DIAGNOSIS — Z12.11 SCREENING FOR COLON CANCER: ICD-10-CM

## 2017-11-17 NOTE — PROGRESS NOTES
WebIZ Checked & Epic Updated: Yes  · WebIZ Recommendations: ZOSTAVAX (Shingles)  · Is patient due for Tdap? NO  · Is patient due for Shingles? YES. Patient was notified of copay/out of pocket cost.  HealthConnect Verified: yes  Verify PCP: yes    Communication Preference Obtained: yes     Review Care Team: yes    Annual Wellness Visit Scheduling  1. Scheduling Status:Scheduled     Care Gap Scheduling (Attempt to Schedule EACH Overdue Care Gap!)     Health Maintenance Due   Topic Date Due   • IMM ZOSTER VACCINE  Will get it at later time   • MAMMOGRAM  04/15/2017   • COLONOSCOPY  Orders sent to PCP        Scheduled patient for Annual Wellness Visit. Mammogram/       MyChart Activation: declined

## 2017-11-17 NOTE — PROGRESS NOTES
Outcome: Left Message    Please transfer to Patient Outreach Team at [phone] when patient returns call.      Attempt # 3

## 2017-11-20 DIAGNOSIS — F41.9 ANXIETY: ICD-10-CM

## 2017-11-20 RX ORDER — SERTRALINE HYDROCHLORIDE 25 MG/1
25 TABLET, FILM COATED ORAL DAILY
Qty: 30 TAB | Refills: 0 | Status: SHIPPED | OUTPATIENT
Start: 2017-11-20 | End: 2017-12-20

## 2017-12-06 DIAGNOSIS — M25.50 ARTHRALGIA, UNSPECIFIED JOINT: ICD-10-CM

## 2017-12-06 RX ORDER — CYCLOBENZAPRINE HCL 10 MG
10 TABLET ORAL 3 TIMES DAILY PRN
Qty: 90 TAB | Refills: 3 | Status: SHIPPED | OUTPATIENT
Start: 2017-12-06 | End: 2017-12-20

## 2017-12-06 RX ORDER — GABAPENTIN 300 MG/1
300 CAPSULE ORAL 3 TIMES DAILY
Qty: 270 CAP | Refills: 6 | Status: SHIPPED | OUTPATIENT
Start: 2017-12-06 | End: 2018-03-20 | Stop reason: SDUPTHER

## 2017-12-18 ENCOUNTER — APPOINTMENT (OUTPATIENT)
Dept: RADIOLOGY | Facility: MEDICAL CENTER | Age: 67
End: 2017-12-18
Attending: INTERNAL MEDICINE
Payer: MEDICARE

## 2017-12-20 ENCOUNTER — HOSPITAL ENCOUNTER (OUTPATIENT)
Dept: RADIOLOGY | Facility: MEDICAL CENTER | Age: 67
End: 2017-12-20
Attending: INTERNAL MEDICINE
Payer: MEDICARE

## 2017-12-20 ENCOUNTER — OFFICE VISIT (OUTPATIENT)
Dept: MEDICAL GROUP | Facility: MEDICAL CENTER | Age: 67
End: 2017-12-20
Payer: MEDICARE

## 2017-12-20 VITALS
HEART RATE: 62 BPM | SYSTOLIC BLOOD PRESSURE: 120 MMHG | DIASTOLIC BLOOD PRESSURE: 70 MMHG | RESPIRATION RATE: 16 BRPM | WEIGHT: 130.6 LBS | BODY MASS INDEX: 24.66 KG/M2 | OXYGEN SATURATION: 96 % | TEMPERATURE: 97.9 F | HEIGHT: 61 IN

## 2017-12-20 DIAGNOSIS — F33.41 RECURRENT MAJOR DEPRESSIVE DISORDER, IN PARTIAL REMISSION (HCC): ICD-10-CM

## 2017-12-20 DIAGNOSIS — J45.20 MILD INTERMITTENT ASTHMA IN ADULT WITHOUT COMPLICATION: ICD-10-CM

## 2017-12-20 DIAGNOSIS — E78.5 DYSLIPIDEMIA: Chronic | ICD-10-CM

## 2017-12-20 DIAGNOSIS — I10 HTN (HYPERTENSION), BENIGN: Chronic | ICD-10-CM

## 2017-12-20 DIAGNOSIS — Z91.81 RISK FOR FALLS: ICD-10-CM

## 2017-12-20 DIAGNOSIS — R73.02 IGT (IMPAIRED GLUCOSE TOLERANCE): ICD-10-CM

## 2017-12-20 DIAGNOSIS — Z00.00 MEDICARE ANNUAL WELLNESS VISIT, SUBSEQUENT: ICD-10-CM

## 2017-12-20 DIAGNOSIS — H40.10X0 OPEN-ANGLE GLAUCOMA, UNSPECIFIED GLAUCOMA STAGE, UNSPECIFIED LATERALITY, UNSPECIFIED OPEN-ANGLE GLAUCOMA TYPE: ICD-10-CM

## 2017-12-20 DIAGNOSIS — M25.50 ARTHRALGIA, UNSPECIFIED JOINT: ICD-10-CM

## 2017-12-20 DIAGNOSIS — G10 HUNTINGTON CHOREA (HCC): Chronic | ICD-10-CM

## 2017-12-20 DIAGNOSIS — F33.1 MODERATE EPISODE OF RECURRENT MAJOR DEPRESSIVE DISORDER (HCC): ICD-10-CM

## 2017-12-20 DIAGNOSIS — N18.30 CHRONIC KIDNEY DISEASE, STAGE III (MODERATE) (HCC): ICD-10-CM

## 2017-12-20 DIAGNOSIS — K21.9 GASTROESOPHAGEAL REFLUX DISEASE WITHOUT ESOPHAGITIS: Chronic | ICD-10-CM

## 2017-12-20 DIAGNOSIS — Z12.31 VISIT FOR SCREENING MAMMOGRAM: ICD-10-CM

## 2017-12-20 DIAGNOSIS — N39.46 MIXED INCONTINENCE: ICD-10-CM

## 2017-12-20 PROCEDURE — G0202 SCR MAMMO BI INCL CAD: HCPCS

## 2017-12-20 PROCEDURE — 99213 OFFICE O/P EST LOW 20 MIN: CPT | Mod: 25 | Performed by: INTERNAL MEDICINE

## 2017-12-20 PROCEDURE — G0439 PPPS, SUBSEQ VISIT: HCPCS | Mod: 25 | Performed by: INTERNAL MEDICINE

## 2017-12-20 RX ORDER — CYCLOBENZAPRINE HCL 10 MG
10 TABLET ORAL 3 TIMES DAILY PRN
Qty: 90 TAB | Refills: 3 | Status: SHIPPED | OUTPATIENT
Start: 2017-12-20 | End: 2018-09-27

## 2017-12-20 RX ORDER — TRAZODONE HYDROCHLORIDE 50 MG/1
50 TABLET ORAL
Qty: 90 TAB | Refills: 3 | Status: SHIPPED | OUTPATIENT
Start: 2017-12-20 | End: 2019-03-02 | Stop reason: SDUPTHER

## 2017-12-20 RX ORDER — HYDROXYZINE PAMOATE 50 MG/1
50 CAPSULE ORAL 3 TIMES DAILY PRN
Qty: 30 CAP | Refills: 11 | Status: SHIPPED | OUTPATIENT
Start: 2017-12-20 | End: 2018-01-24 | Stop reason: SDUPTHER

## 2017-12-20 RX ORDER — ATORVASTATIN CALCIUM 80 MG/1
80 TABLET, FILM COATED ORAL EVERY EVENING
Qty: 90 TAB | Refills: 3 | Status: SHIPPED | OUTPATIENT
Start: 2017-12-20 | End: 2018-12-29 | Stop reason: SDUPTHER

## 2017-12-20 RX ORDER — ESTRADIOL 0.5 MG/1
0.5 TABLET ORAL DAILY
Qty: 90 TAB | Refills: 3 | Status: SHIPPED | OUTPATIENT
Start: 2017-12-20 | End: 2018-12-29 | Stop reason: SDUPTHER

## 2017-12-20 RX ORDER — OMEPRAZOLE 40 MG/1
40 CAPSULE, DELAYED RELEASE ORAL DAILY
Qty: 90 CAP | Refills: 3 | Status: SHIPPED | OUTPATIENT
Start: 2017-12-20 | End: 2018-10-29

## 2017-12-20 RX ORDER — CELECOXIB 200 MG/1
200 CAPSULE ORAL DAILY
Qty: 30 CAP | Refills: 11 | Status: SHIPPED | OUTPATIENT
Start: 2017-12-20 | End: 2018-01-24 | Stop reason: SDUPTHER

## 2017-12-20 RX ORDER — OXYBUTYNIN CHLORIDE 5 MG/1
5 TABLET ORAL 2 TIMES DAILY
Qty: 180 TAB | Refills: 3 | Status: SHIPPED | OUTPATIENT
Start: 2017-12-20 | End: 2018-10-29

## 2017-12-20 ASSESSMENT — PATIENT HEALTH QUESTIONNAIRE - PHQ9
SUM OF ALL RESPONSES TO PHQ QUESTIONS 1-9: 8
CLINICAL INTERPRETATION OF PHQ2 SCORE: 2
5. POOR APPETITE OR OVEREATING: 3 - NEARLY EVERY DAY

## 2017-12-20 NOTE — PROGRESS NOTES
CC: Multiple issues due for wellness examination.    HPI:   Janeen presents today with the following.    1. Medicare annual wellness visit, subsequent  Screenings performed below.    2. Moderate episode of recurrent major depressive disorder (CMS-HCC)  She reports mood is somewhat poor because of her son currently being in [facility] and she is maintained on Zoloft at low dose would like to go out. Denies any suicidal ideation and depression screening performed below.    3. Arthralgia, unspecified joint  Having persistent arthralgias like to try another medication other than narcotics. Multiple joints affected.    4. [place] chorea (CMS-HCC)  Clinically stable denying any than specific symptomology.    5. Chronic kidney disease, stage III (moderate)  Mild kidney disease no blood in urine she is stabilized last check.    6. Gastroesophageal reflux disease without esophagitis  Denying any breakthrough symptomology    7. Dyslipidemia  Maintained on statin without myalgia    8. HTN (hypertension), benign  Blood pressure well controlled    9. Mixed incontinence  Requesting refills of medications    10. Recurrent major depressive disorder, in partial remission (CMS-HCC)  Duplicate diagnosis    11. Mild intermittent asthma in adult without complication  Denies any advancement of breathing issues    12. IGT (impaired glucose tolerance)  Blood sugars of been nondiabetic for some time she is coming due for recheck blood work.    13. Open-angle glaucoma, unspecified glaucoma stage, unspecified laterality, unspecified open-angle glaucoma type  Followed by ophthalmology    14. Risk for falls  Denies any recent injury.      Depression Screening    Little interest or pleasure in doing things?  1 - several days  Feeling down, depressed , or hopeless? 1 - several days  Trouble falling or staying asleep, or sleeping too much?  0 - not at all  Feeling tired or having little energy?  3 - nearly every day  Poor appetite or overeating?  3  - nearly every day  Feeling bad about yourself - or that you are a failure or have let yourself or your family down? 0 - not at all  Trouble concentrating on things, such as reading the newspaper or watching television? 0 - not at all  Moving or speaking so slowly that other people could have noticed.  Or the opposite - being so fidgety or restless that you have been moving around a lot more than usual?  0 - not at all  Thoughts that you would be better off dead, or of hurting yourself?  0 - not at all  Patient Health Questionnaire Score: 8    If depressive symptoms identified deferred to follow up visit unless specifically addressed in assessment and plan.    Interpretation of PHQ-9 Total Score   Score Severity   1-4 No Depression   5-9 Mild Depression   10-14 Moderate Depression   15-19 Moderately Severe Depression   20-27 Severe Depression      Screening for Cognitive Impairment    Three Minute Recall (apple, watch, zoe)  3/3    Draw clock face with all 12 numbers set to the hand to show 10 minutes past 11 o'clock  1 4/5  Cognitive concerns identified deferred for follow up unless specifically addressed in assessment and plan.    Fall Risk Assessment    Has the patient had two or more falls in the last year or any fall with injury in the last year?  Yes    Safety Assessment    Throw rugs on floor.  Yes  Handrails on all stairs.  No  Good lighting in all hallways.  Yes  Difficulty hearing.  Yes  Patient counseled about all safety risks that were identified.    Functional Assessment ADLs    Are there any barriers preventing you from cooking for yourself or meeting nutritional needs?  Yes.    Are there any barriers preventing you from driving safely or obtaining transportation?  No.    Are there any barriers preventing you from using a telephone or calling for help?  No.    Are there any barriers preventing you from shopping?  Yes.    Are there any barriers preventing you from taking care of your own finances?   Yes.    Are there any barriers preventing you from managing your medications?  Yes.    Are currently engaging any exercise or physical activity?  Yes.       Health Maintenance Summary                COLON CANCER SCREENING ANNUAL FIT Overdue 7/18/2001     MAMMOGRAM Overdue 4/15/2017      Done 4/15/2016 MA-SCREEN MAMMO W/CAD-BILAT     Patient has more history with this topic...    COLONOSCOPY Overdue 10/1/2017      Done 10/1/2012 REFERRAL TO GI FOR COLONOSCOPY     Patient has more history with this topic...    Annual Wellness Visit Next Due 12/31/2017      Done 12/30/2016 Visit Dx: Medicare annual wellness visit, subsequent     Patient has more history with this topic...    IMM PNEUMOCOCCAL 65+ (ADULT) LOW/MEDIUM RISK SERIES Next Due 4/6/2021      Done 6/2/2017 Imm Admin: Pneumococcal Conjugate Vaccine (Prevnar/PCV-13)     Patient has more history with this topic...    BONE DENSITY Next Due 1/11/2022      Done 1/11/2017 DS-BONE DENSITY STUDY (DEXA)     Patient has more history with this topic...    IMM DTaP/Tdap/Td Vaccine Next Due 11/2/2026      Done 11/2/2016 Imm Admin: Tdap Vaccine     Patient has more history with this topic...          Patient Care Team:  Nico Garcia M.D. as PCP - General  Peace Vail M.D. as Consulting Physician (Ophthalmology)      Patient Active Problem List    Diagnosis Date Noted   • [place] chorea (CMS-HCC) 01/11/2010     Priority: High   • Chronic kidney disease, stage III (moderate) 04/16/2015     Priority: Medium   • Risk for falls 12/20/2017   • Hot flashes 03/02/2017   • Mixed incontinence 03/02/2017   • Recurrent major depressive disorder, in partial remission (CMS-HCC) 12/30/2016   • Asthma in adult 12/30/2016   • IGT (impaired glucose tolerance) 02/22/2013   • GERD (gastroesophageal reflux disease) 01/11/2010   • Dyslipidemia 01/11/2010   • HTN (hypertension), benign 01/11/2010   • Glaucoma 01/11/2010       Current Outpatient Prescriptions   Medication Sig Dispense Refill    • sertraline (ZOLOFT) 50 MG Tab Take 1 Tab by mouth every day. 30 Tab 11   • hydrOXYzine pamoate (VISTARIL) 50 MG Cap Take 1 Cap by mouth 3 times a day as needed for Itching. TWICE DAILY 30 Cap 11   • omeprazole (PRILOSEC) 40 MG delayed-release capsule Take 1 Cap by mouth every day. 90 Cap 3   • oxybutynin (DITROPAN) 5 MG Tab Take 1 Tab by mouth 2 Times a Day. TWICE DAILY 180 Tab 3   • trazodone (DESYREL) 50 MG Tab Take 1 Tab by mouth every bedtime. 90 Tab 3   • estradiol (ESTRACE) 0.5 MG tablet Take 1 Tab by mouth every day. 90 Tab 3   • atorvastatin (LIPITOR) 80 MG tablet Take 1 Tab by mouth every evening. 90 Tab 3   • cyclobenzaprine (FLEXERIL) 10 MG Tab Take 1 Tab by mouth 3 times a day as needed. 90 Tab 3   • celecoxib (CELEBREX) 200 MG Cap Take 1 Cap by mouth every day. 30 Cap 11   • gabapentin (NEURONTIN) 300 MG Cap Take 1 Cap by mouth 3 times a day. 270 Cap 6   • Cholecalciferol (VITAMIN D3) 3000 UNITS Tab Take  by mouth.     • Probiotic Product (PRO-BIOTIC BLEND PO) Take  by mouth.     • Multiple Vitamins-Minerals (HAIR/SKIN/NAILS PO) Take  by mouth.     • artificial tear ointment (REFRESH,LACRI-LUBE) Ointment Place 1 Application in both eyes every 8 hours.     • Loteprednol Etabonate (LOTEMAX) 0.5 % Ointment by Ophthalmic route.     • fluticasone (FLONASE) 50 MCG/ACT nasal spray Spray 1 Spray in nose every day. 16 g 11   • PROAIR  (90 BASE) MCG/ACT AERS inhalation aerosol Inhale 2 Puffs by mouth every 6 hours as needed for Shortness of Breath. 3 Inhaler 3   • docosahexanoic acid (OMEGA 3 FA) 1000 MG CAPS Take 1,000 mg by mouth 3 times a day, with meals.     • vitamin D, Ergocalciferol, (DRISDOL) 83550 UNIT CAPS capsule Take 1 Cap by mouth every 7 days. 12 Cap 3   • aspirin 81 MG tablet Take 81 mg by mouth every day.     • Calcium Carbonate-Vitamin D (CALTRATE 600+D PO) Take  by mouth every day.     • Latanoprost (XALATAN OP) by Ophthalmic route every day.       No current facility-administered  "medications for this visit.          Allergies as of 12/20/2017 - Reviewed 12/20/2017   Allergen Reaction Noted   • Other misc Shortness of Breath 12/03/2010   • Morphine Anaphylaxis 07/21/2017   • Soap Rash 12/03/2010        ROS: As per HPI.    /70   Pulse 62   Temp 36.6 °C (97.9 °F)   Resp 16   Ht 1.549 m (5' 1\")   Wt 59.2 kg (130 lb 9.6 oz)   SpO2 96%   BMI 24.68 kg/m²     Physical Exam:  Gen:         Alert and oriented, No apparent distress.  Neck:        No Lymphadenopathy or Bruits.  Lungs:     Clear to auscultation bilaterally  CV:          Regular rate and rhythm. No murmurs, rubs or gallops.  Abd:         Soft non tender, non distended. Normal active bowel sounds.  No  Hepatosplenomegaly, No pulsatile masses.                   Ext:          No clubbing, cyanosis, edema.      Assessment and Plan.   67 y.o. female with the following issues.    1. Medicare annual wellness visit, subsequent  Discussed healthy lifestyle habits as well as screening regimens.    2. Moderate episode of recurrent major depressive disorder (CMS-HCC)  Increasing Zoloft to 50 mg cautioned about side effects.  - sertraline (ZOLOFT) 50 MG Tab; Take 1 Tab by mouth every day.  Dispense: 30 Tab; Refill: 11    3. Arthralgia, unspecified joint  Multiple components have written for Celebrex cautioned about side effects.  - cyclobenzaprine (FLEXERIL) 10 MG Tab; Take 1 Tab by mouth 3 times a day as needed.  Dispense: 90 Tab; Refill: 3  - celecoxib (CELEBREX) 200 MG Cap; Take 1 Cap by mouth every day.  Dispense: 30 Cap; Refill: 11    4. [place] chorea (CMS-HCC)  Stable refilled anxiety medication  - hydrOXYzine pamoate (VISTARIL) 50 MG Cap; Take 1 Cap by mouth 3 times a day as needed for Itching. TWICE DAILY  Dispense: 30 Cap; Refill: 11    5. Chronic kidney disease, stage III (moderate)  Recheck blood work    6. Gastroesophageal reflux disease without esophagitis  No breakthrough symptomology  - omeprazole (PRILOSEC) 40 MG " delayed-release capsule; Take 1 Cap by mouth every day.  Dispense: 90 Cap; Refill: 3    7. Dyslipidemia  Recheck cholesterol  - atorvastatin (LIPITOR) 80 MG tablet; Take 1 Tab by mouth every evening.  Dispense: 90 Tab; Refill: 3    8. HTN (hypertension), benign  Currently well controlled, Discuss diet, exercise and salt restriction.    9. Mixed incontinence  Refilled medications    10. Recurrent major depressive disorder, in partial remission (CMS-HCC)  Duplicate diagnosis    11. Mild intermittent asthma in adult without complication  Clinical history stable    12. IGT (impaired glucose tolerance)  Recheck blood work    13. Open-angle glaucoma, unspecified glaucoma stage, unspecified laterality, unspecified open-angle glaucoma type  Follow with ophthalmology    14. Risk for falls  Precautions given.  - Patient identified as fall risk.  Appropriate orders and counseling given.

## 2018-01-10 ENCOUNTER — HOSPITAL ENCOUNTER (OUTPATIENT)
Facility: MEDICAL CENTER | Age: 68
End: 2018-01-10
Attending: INTERNAL MEDICINE
Payer: MEDICARE

## 2018-01-10 PROCEDURE — 82274 ASSAY TEST FOR BLOOD FECAL: CPT

## 2018-01-23 DIAGNOSIS — Z12.11 SCREEN FOR COLON CANCER: ICD-10-CM

## 2018-01-23 DIAGNOSIS — F33.1 MODERATE EPISODE OF RECURRENT MAJOR DEPRESSIVE DISORDER (HCC): ICD-10-CM

## 2018-01-24 DIAGNOSIS — M25.50 ARTHRALGIA, UNSPECIFIED JOINT: ICD-10-CM

## 2018-01-24 DIAGNOSIS — Z12.11 SCREEN FOR COLON CANCER: ICD-10-CM

## 2018-01-24 DIAGNOSIS — G10 HUNTINGTON CHOREA (HCC): Chronic | ICD-10-CM

## 2018-01-24 RX ORDER — CELECOXIB 200 MG/1
200 CAPSULE ORAL DAILY
Qty: 90 CAP | Refills: 3 | Status: SHIPPED | OUTPATIENT
Start: 2018-01-24 | End: 2018-04-09 | Stop reason: SDUPTHER

## 2018-01-24 RX ORDER — HYDROXYZINE PAMOATE 50 MG/1
50 CAPSULE ORAL 3 TIMES DAILY PRN
Qty: 360 CAP | Refills: 3 | Status: SHIPPED | OUTPATIENT
Start: 2018-01-24 | End: 2018-04-09

## 2018-01-28 LAB — HEMOCCULT STL QL IA: NEGATIVE

## 2018-03-19 ENCOUNTER — APPOINTMENT (OUTPATIENT)
Dept: MEDICAL GROUP | Facility: MEDICAL CENTER | Age: 68
End: 2018-03-19
Payer: MEDICARE

## 2018-03-20 ENCOUNTER — OFFICE VISIT (OUTPATIENT)
Dept: MEDICAL GROUP | Facility: MEDICAL CENTER | Age: 68
End: 2018-03-20
Payer: MEDICARE

## 2018-03-20 VITALS
HEIGHT: 61 IN | DIASTOLIC BLOOD PRESSURE: 70 MMHG | WEIGHT: 129 LBS | HEART RATE: 74 BPM | RESPIRATION RATE: 16 BRPM | BODY MASS INDEX: 24.35 KG/M2 | OXYGEN SATURATION: 96 % | SYSTOLIC BLOOD PRESSURE: 122 MMHG | TEMPERATURE: 98.6 F

## 2018-03-20 DIAGNOSIS — G43.109 MIGRAINE WITH AURA AND WITHOUT STATUS MIGRAINOSUS, NOT INTRACTABLE: ICD-10-CM

## 2018-03-20 DIAGNOSIS — F33.1 MODERATE EPISODE OF RECURRENT MAJOR DEPRESSIVE DISORDER (HCC): ICD-10-CM

## 2018-03-20 DIAGNOSIS — M25.50 POLYARTHRALGIA: ICD-10-CM

## 2018-03-20 DIAGNOSIS — Z79.890 HORMONE REPLACEMENT THERAPY: ICD-10-CM

## 2018-03-20 DIAGNOSIS — G10 HUNTINGTON CHOREA (HCC): Chronic | ICD-10-CM

## 2018-03-20 PROCEDURE — 99214 OFFICE O/P EST MOD 30 MIN: CPT | Performed by: FAMILY MEDICINE

## 2018-03-20 RX ORDER — SUMATRIPTAN 100 MG/1
50-100 TABLET, FILM COATED ORAL
Qty: 10 TAB | Refills: 3 | Status: SHIPPED | OUTPATIENT
Start: 2018-03-20 | End: 2019-01-16 | Stop reason: SDUPTHER

## 2018-03-20 RX ORDER — GABAPENTIN 300 MG/1
600 CAPSULE ORAL 3 TIMES DAILY
Qty: 180 CAP | Refills: 1 | Status: SHIPPED | OUTPATIENT
Start: 2018-03-20 | End: 2022-03-14

## 2018-03-20 RX ORDER — DIMENHYDRINATE 50 MG
TABLET ORAL
COMMUNITY
End: 2018-07-09
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
## 2018-04-02 RX ORDER — HYDROXYZINE 50 MG/1
TABLET, FILM COATED ORAL
Qty: 180 TAB | Refills: 3 | Status: SHIPPED | OUTPATIENT
Start: 2018-04-02 | End: 2018-04-02 | Stop reason: SDUPTHER

## 2018-04-02 RX ORDER — HYDROXYZINE 50 MG/1
50 TABLET, FILM COATED ORAL 3 TIMES DAILY PRN
Qty: 270 TAB | Refills: 0 | Status: SHIPPED | OUTPATIENT
Start: 2018-04-02 | End: 2018-09-27

## 2018-04-09 ENCOUNTER — OFFICE VISIT (OUTPATIENT)
Dept: MEDICAL GROUP | Facility: MEDICAL CENTER | Age: 68
End: 2018-04-09
Payer: MEDICARE

## 2018-04-09 VITALS
TEMPERATURE: 98.3 F | HEART RATE: 83 BPM | HEIGHT: 61 IN | WEIGHT: 126 LBS | DIASTOLIC BLOOD PRESSURE: 74 MMHG | BODY MASS INDEX: 23.79 KG/M2 | RESPIRATION RATE: 16 BRPM | SYSTOLIC BLOOD PRESSURE: 126 MMHG | OXYGEN SATURATION: 95 %

## 2018-04-09 DIAGNOSIS — M25.512 CHRONIC LEFT SHOULDER PAIN: ICD-10-CM

## 2018-04-09 DIAGNOSIS — R73.02 IGT (IMPAIRED GLUCOSE TOLERANCE): ICD-10-CM

## 2018-04-09 DIAGNOSIS — G89.29 CHRONIC LOW BACK PAIN WITHOUT SCIATICA, UNSPECIFIED BACK PAIN LATERALITY: ICD-10-CM

## 2018-04-09 DIAGNOSIS — G89.29 CHRONIC LEFT SHOULDER PAIN: ICD-10-CM

## 2018-04-09 DIAGNOSIS — M54.50 CHRONIC LOW BACK PAIN WITHOUT SCIATICA, UNSPECIFIED BACK PAIN LATERALITY: ICD-10-CM

## 2018-04-09 PROCEDURE — 99213 OFFICE O/P EST LOW 20 MIN: CPT | Performed by: INTERNAL MEDICINE

## 2018-04-09 RX ORDER — CELECOXIB 200 MG/1
200 CAPSULE ORAL DAILY
Qty: 90 CAP | Refills: 3 | Status: SHIPPED | OUTPATIENT
Start: 2018-04-09 | End: 2018-09-27 | Stop reason: SDUPTHER

## 2018-04-09 NOTE — PROGRESS NOTES
CC: Follow-up back pain, shoulder pain, elevated blood sugars    HPI:   Janeen presents today with the following.    1. Chronic low back pain without sciatica, unspecified back pain laterality  Presents with a history of long standing back pain she is seen pain specialists in the past for by a referral for possible injections.    2. Chronic left shoulder pain  Arthritic shoulders well she is currently not taking her Celebrex she did find it helpful in the past with like a refill the medication.    3. IGT (impaired glucose tolerance)  Elevated blood sugars in the past is overdue for recheck. Denying excessive thirst urination never truly been diabetic      Patient Active Problem List    Diagnosis Date Noted   • [place] chorea (CMS-HCC) 01/11/2010     Priority: High   • Chronic kidney disease, stage III (moderate) 04/16/2015     Priority: Medium   • Risk for falls 12/20/2017   • Hot flashes 03/02/2017   • Mixed incontinence 03/02/2017   • Recurrent major depressive disorder, in partial remission (CMS-HCC) 12/30/2016   • Asthma in adult 12/30/2016   • IGT (impaired glucose tolerance) 02/22/2013   • GERD (gastroesophageal reflux disease) 01/11/2010   • Dyslipidemia 01/11/2010   • HTN (hypertension), benign 01/11/2010   • Glaucoma 01/11/2010       Current Outpatient Prescriptions   Medication Sig Dispense Refill   • celecoxib (CELEBREX) 200 MG Cap Take 1 Cap by mouth every day. 90 Cap 3   • hydrOXYzine HCl (ATARAX) 50 MG Tab Take 1 Tab by mouth 3 times a day as needed for Itching. 270 Tab 0   • Flaxseed, Linseed, (FLAX SEED OIL) 1000 MG Cap Take  by mouth.     • sertraline (ZOLOFT) 50 MG Tab Take 1 Tab by mouth every day. 90 Tab 0   • gabapentin (NEURONTIN) 300 MG Cap Take 2 Caps by mouth 3 times a day. (Patient taking differently: Take 600 mg by mouth 2 Times a Day.) 180 Cap 1   • omeprazole (PRILOSEC) 40 MG delayed-release capsule Take 1 Cap by mouth every day. 90 Cap 3   • oxybutynin (DITROPAN) 5 MG Tab Take 1 Tab  "by mouth 2 Times a Day. TWICE DAILY 180 Tab 3   • trazodone (DESYREL) 50 MG Tab Take 1 Tab by mouth every bedtime. 90 Tab 3   • estradiol (ESTRACE) 0.5 MG tablet Take 1 Tab by mouth every day. 90 Tab 3   • atorvastatin (LIPITOR) 80 MG tablet Take 1 Tab by mouth every evening. 90 Tab 3   • cyclobenzaprine (FLEXERIL) 10 MG Tab Take 1 Tab by mouth 3 times a day as needed. 90 Tab 3   • Cholecalciferol (VITAMIN D3) 3000 UNITS Tab Take  by mouth.     • Multiple Vitamins-Minerals (HAIR/SKIN/NAILS PO) Take  by mouth.     • artificial tear ointment (REFRESH,LACRI-LUBE) Ointment Place 1 Application in both eyes every 8 hours.     • Loteprednol Etabonate (LOTEMAX) 0.5 % Ointment by Ophthalmic route.     • fluticasone (FLONASE) 50 MCG/ACT nasal spray Spray 1 Spray in nose every day. 16 g 11   • PROAIR  (90 BASE) MCG/ACT AERS inhalation aerosol Inhale 2 Puffs by mouth every 6 hours as needed for Shortness of Breath. 3 Inhaler 3   • docosahexanoic acid (OMEGA 3 FA) 1000 MG CAPS Take 1,000 mg by mouth 3 times a day, with meals.     • vitamin D, Ergocalciferol, (DRISDOL) 22728 UNIT CAPS capsule Take 1 Cap by mouth every 7 days. 12 Cap 3   • aspirin 81 MG tablet Take 81 mg by mouth every day.     • Calcium Carbonate-Vitamin D (CALTRATE 600+D PO) Take  by mouth every day.     • Latanoprost (XALATAN OP) by Ophthalmic route every day.     • sumatriptan (IMITREX) 100 MG tablet Take 0.5-1 Tabs by mouth Once PRN for Migraine for up to 1 dose. (Patient not taking: Reported on 4/9/2018) 10 Tab 3     No current facility-administered medications for this visit.          Allergies as of 04/09/2018 - Reviewed 04/09/2018   Allergen Reaction Noted   • Other misc Shortness of Breath 12/03/2010   • Morphine Anaphylaxis 07/21/2017   • Soap Rash 12/03/2010        ROS: Denies Chest pain, SOB, LE edema.    /74   Pulse 83   Temp 36.8 °C (98.3 °F)   Resp 16   Ht 1.549 m (5' 1\")   Wt 57.2 kg (126 lb)   SpO2 95%   BMI 23.81 kg/m²  "     Physical Exam:  Gen:         Alert and oriented, No apparent distress.  Neck:        No Lymphadenopathy or Bruits.  Lungs:     Clear to auscultation bilaterally  CV:          Regular rate and rhythm. No murmurs, rubs or gallops.               Ext:          No clubbing, cyanosis, edema.      Assessment and Plan.   67 y.o. female with the following issues.    1. Chronic low back pain without sciatica, unspecified back pain laterality  Referred pain specialist  - REFERRAL TO PAIN CLINIC    2. Chronic left shoulder pain  Celebrex as above  - REFERRAL TO PAIN CLINIC  - celecoxib (CELEBREX) 200 MG Cap; Take 1 Cap by mouth every day.  Dispense: 90 Cap; Refill: 3    3. IGT (impaired glucose tolerance)  Recheck blood work  - COMP METABOLIC PANEL; Future  - LIPID PROFILE; Future  - HEMOGLOBIN A1C; Future

## 2018-07-04 DIAGNOSIS — F33.1 MODERATE EPISODE OF RECURRENT MAJOR DEPRESSIVE DISORDER (HCC): ICD-10-CM

## 2018-07-09 ENCOUNTER — OFFICE VISIT (OUTPATIENT)
Dept: MEDICAL GROUP | Facility: MEDICAL CENTER | Age: 68
End: 2018-07-09
Payer: MEDICARE

## 2018-07-09 VITALS
HEIGHT: 61 IN | HEART RATE: 75 BPM | WEIGHT: 127 LBS | TEMPERATURE: 98.6 F | RESPIRATION RATE: 16 BRPM | OXYGEN SATURATION: 96 % | DIASTOLIC BLOOD PRESSURE: 66 MMHG | SYSTOLIC BLOOD PRESSURE: 122 MMHG | BODY MASS INDEX: 23.98 KG/M2

## 2018-07-09 DIAGNOSIS — I10 HTN (HYPERTENSION), BENIGN: Chronic | ICD-10-CM

## 2018-07-09 DIAGNOSIS — R73.02 IGT (IMPAIRED GLUCOSE TOLERANCE): ICD-10-CM

## 2018-07-09 DIAGNOSIS — F33.1 MODERATE EPISODE OF RECURRENT MAJOR DEPRESSIVE DISORDER (HCC): ICD-10-CM

## 2018-07-09 DIAGNOSIS — E78.5 DYSLIPIDEMIA: Chronic | ICD-10-CM

## 2018-07-09 LAB
HBA1C MFR BLD: 5.7 % (ref ?–5.8)
INT CON NEG: NEGATIVE
INT CON POS: POSITIVE

## 2018-07-09 PROCEDURE — 83036 HEMOGLOBIN GLYCOSYLATED A1C: CPT | Performed by: INTERNAL MEDICINE

## 2018-07-09 PROCEDURE — 99214 OFFICE O/P EST MOD 30 MIN: CPT | Performed by: INTERNAL MEDICINE

## 2018-07-09 NOTE — PROGRESS NOTES
CC: Follow-up blood sugars, depression, blood pressure.    HPI:   Janeen presents today with the following.    1. IGT (impaired glucose tolerance)  Presents after having blood work in office today at 5.7.  She has never truly been diabetic sugars have been slightly up on blood work in the past.  She is denying excessive thirst or urination    2. Moderate episode of recurrent major depressive disorder (HCC)  Currently doing well she did have medication sent to the pharmacy however they are reporting that he did not receive.  She is tolerating medication well.    3. HTN (hypertension), benign  Blood pressure well controlled on current regimen denying any chest pain or shortness of breath no edema      Patient Active Problem List    Diagnosis Date Noted   • [place] chorea (HCC) 01/11/2010     Priority: High   • Chronic kidney disease, stage III (moderate) 04/16/2015     Priority: Medium   • Risk for falls 12/20/2017   • Hot flashes 03/02/2017   • Mixed incontinence 03/02/2017   • Recurrent major depressive disorder, in partial remission (HCC) 12/30/2016   • Asthma in adult 12/30/2016   • IGT (impaired glucose tolerance) 02/22/2013   • GERD (gastroesophageal reflux disease) 01/11/2010   • Dyslipidemia 01/11/2010   • HTN (hypertension), benign 01/11/2010   • Glaucoma 01/11/2010       Current Outpatient Prescriptions   Medication Sig Dispense Refill   • sertraline (ZOLOFT) 50 MG Tab Take 1 Tab by mouth every day. 90 Tab 3   • celecoxib (CELEBREX) 200 MG Cap Take 1 Cap by mouth every day. 90 Cap 3   • hydrOXYzine HCl (ATARAX) 50 MG Tab Take 1 Tab by mouth 3 times a day as needed for Itching. 270 Tab 0   • sumatriptan (IMITREX) 100 MG tablet Take 0.5-1 Tabs by mouth Once PRN for Migraine for up to 1 dose. (Patient not taking: Reported on 4/9/2018) 10 Tab 3   • gabapentin (NEURONTIN) 300 MG Cap Take 2 Caps by mouth 3 times a day. (Patient taking differently: Take 600 mg by mouth 2 Times a Day.) 180 Cap 1   • omeprazole  "(PRILOSEC) 40 MG delayed-release capsule Take 1 Cap by mouth every day. 90 Cap 3   • oxybutynin (DITROPAN) 5 MG Tab Take 1 Tab by mouth 2 Times a Day. TWICE DAILY 180 Tab 3   • trazodone (DESYREL) 50 MG Tab Take 1 Tab by mouth every bedtime. 90 Tab 3   • estradiol (ESTRACE) 0.5 MG tablet Take 1 Tab by mouth every day. 90 Tab 3   • atorvastatin (LIPITOR) 80 MG tablet Take 1 Tab by mouth every evening. 90 Tab 3   • cyclobenzaprine (FLEXERIL) 10 MG Tab Take 1 Tab by mouth 3 times a day as needed. 90 Tab 3   • Cholecalciferol (VITAMIN D3) 3000 UNITS Tab Take  by mouth.     • Multiple Vitamins-Minerals (HAIR/SKIN/NAILS PO) Take  by mouth.     • artificial tear ointment (REFRESH,LACRI-LUBE) Ointment Place 1 Application in both eyes every 8 hours.     • Loteprednol Etabonate (LOTEMAX) 0.5 % Ointment by Ophthalmic route.     • fluticasone (FLONASE) 50 MCG/ACT nasal spray Spray 1 Spray in nose every day. 16 g 11   • PROAIR  (90 BASE) MCG/ACT AERS inhalation aerosol Inhale 2 Puffs by mouth every 6 hours as needed for Shortness of Breath. 3 Inhaler 3   • vitamin D, Ergocalciferol, (DRISDOL) 45808 UNIT CAPS capsule Take 1 Cap by mouth every 7 days. 12 Cap 3   • aspirin 81 MG tablet Take 81 mg by mouth every day.     • Calcium Carbonate-Vitamin D (CALTRATE 600+D PO) Take  by mouth every day.     • Latanoprost (XALATAN OP) by Ophthalmic route every day.       No current facility-administered medications for this visit.          Allergies as of 07/09/2018 - Reviewed 07/09/2018   Allergen Reaction Noted   • Other misc Shortness of Breath 12/03/2010   • Morphine Anaphylaxis 07/21/2017   • Soap Rash 12/03/2010        ROS: Denies Chest pain, SOB, LE edema.    /66   Pulse 75   Temp 37 °C (98.6 °F)   Resp 16   Ht 1.549 m (5' 1\")   Wt 57.6 kg (127 lb)   SpO2 96%   BMI 24.00 kg/m²     Physical Exam:  Gen:         Alert and oriented, No apparent distress.  Neck:        No Lymphadenopathy or Bruits.  Lungs:     Clear to " auscultation bilaterally  CV:          Regular rate and rhythm. No murmurs, rubs or gallops.               Ext:          No clubbing, cyanosis, edema.      Assessment and Plan.   67 y.o. female with the following issues.    1. IGT (impaired glucose tolerance)  Discussion about diet continued exercise  - POCT  A1C  - HEMOGLOBIN A1C; Future    2. Moderate episode of recurrent major depressive disorder (HCC)  Clinically doing well refilled medication  - sertraline (ZOLOFT) 50 MG Tab; Take 1 Tab by mouth every day.  Dispense: 90 Tab; Refill: 3    3. HTN (hypertension), benign  Currently well controlled, Discuss diet, exercise and salt restriction.  No change to medication therapy.    4. Dyslipidemia  Recheck next visit in 6 months.  - COMP METABOLIC PANEL; Future  - LIPID PROFILE; Future

## 2018-08-08 DIAGNOSIS — K21.9 GASTROESOPHAGEAL REFLUX DISEASE WITHOUT ESOPHAGITIS: Chronic | ICD-10-CM

## 2018-08-08 RX ORDER — OMEPRAZOLE 40 MG/1
CAPSULE, DELAYED RELEASE ORAL
Qty: 90 CAP | Refills: 3 | Status: SHIPPED | OUTPATIENT
Start: 2018-08-08 | End: 2019-08-15 | Stop reason: SDUPTHER

## 2018-09-27 ENCOUNTER — OFFICE VISIT (OUTPATIENT)
Dept: MEDICAL GROUP | Facility: MEDICAL CENTER | Age: 68
End: 2018-09-27
Payer: MEDICARE

## 2018-09-27 VITALS
HEIGHT: 61 IN | BODY MASS INDEX: 24.17 KG/M2 | TEMPERATURE: 98.7 F | HEART RATE: 74 BPM | DIASTOLIC BLOOD PRESSURE: 68 MMHG | WEIGHT: 128 LBS | SYSTOLIC BLOOD PRESSURE: 130 MMHG | OXYGEN SATURATION: 96 % | RESPIRATION RATE: 16 BRPM

## 2018-09-27 DIAGNOSIS — R73.02 IGT (IMPAIRED GLUCOSE TOLERANCE): ICD-10-CM

## 2018-09-27 DIAGNOSIS — I10 HTN (HYPERTENSION), BENIGN: Chronic | ICD-10-CM

## 2018-09-27 DIAGNOSIS — Z23 NEED FOR VACCINATION: ICD-10-CM

## 2018-09-27 DIAGNOSIS — M54.42 CHRONIC BILATERAL LOW BACK PAIN WITH LEFT-SIDED SCIATICA: ICD-10-CM

## 2018-09-27 DIAGNOSIS — G89.29 CHRONIC LEFT SHOULDER PAIN: ICD-10-CM

## 2018-09-27 DIAGNOSIS — M25.512 CHRONIC LEFT SHOULDER PAIN: ICD-10-CM

## 2018-09-27 DIAGNOSIS — G89.29 CHRONIC BILATERAL LOW BACK PAIN WITH LEFT-SIDED SCIATICA: ICD-10-CM

## 2018-09-27 PROCEDURE — G0008 ADMIN INFLUENZA VIRUS VAC: HCPCS | Performed by: INTERNAL MEDICINE

## 2018-09-27 PROCEDURE — 90662 IIV NO PRSV INCREASED AG IM: CPT | Performed by: INTERNAL MEDICINE

## 2018-09-27 PROCEDURE — 99214 OFFICE O/P EST MOD 30 MIN: CPT | Mod: 25 | Performed by: INTERNAL MEDICINE

## 2018-09-27 RX ORDER — LORATADINE 10 MG/1
10 TABLET ORAL DAILY
Qty: 90 TAB | Refills: 3 | Status: SHIPPED | OUTPATIENT
Start: 2018-09-27 | End: 2018-10-29

## 2018-09-27 RX ORDER — CELECOXIB 200 MG/1
200 CAPSULE ORAL 2 TIMES DAILY
Qty: 180 CAP | Refills: 3 | Status: SHIPPED | OUTPATIENT
Start: 2018-09-27 | End: 2018-10-29

## 2018-09-27 RX ORDER — BACLOFEN 10 MG/1
10 TABLET ORAL 3 TIMES DAILY
Qty: 90 TAB | Refills: 1 | Status: SHIPPED | OUTPATIENT
Start: 2018-09-27 | End: 2018-10-29

## 2018-09-27 NOTE — PROGRESS NOTES
CC: Back pain, follow-up hypertension, impaired blood sugars.    HPI:   Janeen presents today with the following.    1. HTN (hypertension), benign  Presents reporting compliance of blood pressure medication she is not checking at home.    2. IGT (impaired glucose tolerance)  But sugars elevated in the past she is coming due for recheck denying excessive thirst or urination.    3. Chronic bilateral low back pain with left-sided sciatica  She is complaining of what she describes as hip pain but it stemming from the back rating down the left leg all the way to the ankle.  No loss of bowel or bladder no weakness in the extremity pain is 8 out of 10 intensity.  She is on Celebrex and gabapentin she is also taking ibuprofen that she has not been prescribed but over-the-counter.        Patient Active Problem List    Diagnosis Date Noted   • [place] chorea (HCC) 01/11/2010     Priority: High   • Chronic kidney disease, stage III (moderate) 04/16/2015     Priority: Medium   • Risk for falls 12/20/2017   • Hot flashes 03/02/2017   • Mixed incontinence 03/02/2017   • Recurrent major depressive disorder, in partial remission (HCC) 12/30/2016   • Asthma in adult 12/30/2016   • IGT (impaired glucose tolerance) 02/22/2013   • GERD (gastroesophageal reflux disease) 01/11/2010   • Dyslipidemia 01/11/2010   • HTN (hypertension), benign 01/11/2010   • Glaucoma 01/11/2010       Current Outpatient Prescriptions   Medication Sig Dispense Refill   • baclofen (LIORESAL) 10 MG Tab Take 1 Tab by mouth 3 times a day. 90 Tab 1   • loratadine (CLARITIN) 10 MG Tab Take 1 Tab by mouth every day. 90 Tab 3   • celecoxib (CELEBREX) 200 MG Cap Take 1 Cap by mouth 2 times a day. 180 Cap 3   • sertraline (ZOLOFT) 50 MG Tab Take 1 Tab by mouth every day. 90 Tab 3   • gabapentin (NEURONTIN) 300 MG Cap Take 2 Caps by mouth 3 times a day. (Patient taking differently: Take 600 mg by mouth 2 Times a Day.) 180 Cap 1   • omeprazole (PRILOSEC) 40 MG  "delayed-release capsule Take 1 Cap by mouth every day. 90 Cap 3   • oxybutynin (DITROPAN) 5 MG Tab Take 1 Tab by mouth 2 Times a Day. TWICE DAILY 180 Tab 3   • trazodone (DESYREL) 50 MG Tab Take 1 Tab by mouth every bedtime. 90 Tab 3   • estradiol (ESTRACE) 0.5 MG tablet Take 1 Tab by mouth every day. 90 Tab 3   • atorvastatin (LIPITOR) 80 MG tablet Take 1 Tab by mouth every evening. 90 Tab 3   • Cholecalciferol (VITAMIN D3) 3000 UNITS Tab Take  by mouth.     • Multiple Vitamins-Minerals (HAIR/SKIN/NAILS PO) Take  by mouth.     • artificial tear ointment (REFRESH,LACRI-LUBE) Ointment Place 1 Application in both eyes every 8 hours.     • Loteprednol Etabonate (LOTEMAX) 0.5 % Ointment by Ophthalmic route.     • fluticasone (FLONASE) 50 MCG/ACT nasal spray Spray 1 Spray in nose every day. 16 g 11   • PROAIR  (90 BASE) MCG/ACT AERS inhalation aerosol Inhale 2 Puffs by mouth every 6 hours as needed for Shortness of Breath. 3 Inhaler 3   • vitamin D, Ergocalciferol, (DRISDOL) 15239 UNIT CAPS capsule Take 1 Cap by mouth every 7 days. 12 Cap 3   • aspirin 81 MG tablet Take 81 mg by mouth every day.     • Calcium Carbonate-Vitamin D (CALTRATE 600+D PO) Take  by mouth every day.     • Latanoprost (XALATAN OP) by Ophthalmic route every day.     • omeprazole (PRILOSEC) 40 MG delayed-release capsule TAKE ONE CAPSULE BY MOUTH ONCE DAILY 90 Cap 3   • sumatriptan (IMITREX) 100 MG tablet Take 0.5-1 Tabs by mouth Once PRN for Migraine for up to 1 dose. (Patient not taking: Reported on 4/9/2018) 10 Tab 3     No current facility-administered medications for this visit.          Allergies as of 09/27/2018 - Reviewed 09/27/2018   Allergen Reaction Noted   • Other misc Shortness of Breath 12/03/2010   • Morphine Anaphylaxis 07/21/2017   • Soap Rash 12/03/2010        ROS: Denies Chest pain, SOB, LE edema.    /68 (BP Location: Left arm)   Pulse 74   Temp 37.1 °C (98.7 °F)   Resp 16   Ht 1.549 m (5' 1\")   Wt 58.1 kg (128 " lb)   SpO2 96%   BMI 24.19 kg/m²     Physical Exam:  Gen:         Alert and oriented, No apparent distress.  Neck:        No Lymphadenopathy or Bruits.  Lungs:     Clear to auscultation bilaterally  CV:          Regular rate and rhythm. No murmurs, rubs or gallops.               Ext:          No clubbing, cyanosis, edema.      Assessment and Plan.   68 y.o. female with the following issues.    1. HTN (hypertension), benign  Currently well controlled, Discuss diet, exercise and salt restriction.  No change to medication therapy.    2. IGT (impaired glucose tolerance)  Recheck lab    3. Chronic bilateral low back pain with left-sided sciatica  She did benefit from epidurals in the distant past would like to explore again.  Have placed a referral to pain specialist.  MRI prior.  Increasing Celebrex to twice daily and she will take gabapentin 2 pills at night.  Have also switched from Flexeril to baclofen to avoid sedation.  - MR-LUMBAR SPINE-W/O; Future  - REFERRAL TO PAIN CLINIC  - baclofen (LIORESAL) 10 MG Tab; Take 1 Tab by mouth 3 times a day.  Dispense: 90 Tab; Refill: 1  - celecoxib (CELEBREX) 200 MG Cap; Take 1 Cap by mouth 2 times a day.  Dispense: 180 Cap; Refill: 3    4. Need for vaccination    - Influenza Vaccine, High Dose (65+ Only)

## 2018-10-03 RX ORDER — OXYBUTYNIN CHLORIDE 5 MG/1
TABLET ORAL
Qty: 180 TAB | Refills: 3 | Status: SHIPPED | OUTPATIENT
Start: 2018-10-03 | End: 2018-10-29

## 2018-10-04 ENCOUNTER — HOSPITAL ENCOUNTER (OUTPATIENT)
Dept: RADIOLOGY | Facility: MEDICAL CENTER | Age: 68
End: 2018-10-04
Attending: INTERNAL MEDICINE
Payer: MEDICARE

## 2018-10-04 DIAGNOSIS — M54.42 CHRONIC BILATERAL LOW BACK PAIN WITH LEFT-SIDED SCIATICA: ICD-10-CM

## 2018-10-04 DIAGNOSIS — G89.29 CHRONIC BILATERAL LOW BACK PAIN WITH LEFT-SIDED SCIATICA: ICD-10-CM

## 2018-10-04 PROCEDURE — 72148 MRI LUMBAR SPINE W/O DYE: CPT

## 2018-10-29 ENCOUNTER — OFFICE VISIT (OUTPATIENT)
Dept: MEDICAL GROUP | Facility: MEDICAL CENTER | Age: 68
End: 2018-10-29
Payer: MEDICARE

## 2018-10-29 VITALS
TEMPERATURE: 97.1 F | BODY MASS INDEX: 23.73 KG/M2 | DIASTOLIC BLOOD PRESSURE: 68 MMHG | OXYGEN SATURATION: 95 % | HEART RATE: 61 BPM | HEIGHT: 61 IN | WEIGHT: 125.66 LBS | SYSTOLIC BLOOD PRESSURE: 122 MMHG

## 2018-10-29 DIAGNOSIS — N32.81 OAB (OVERACTIVE BLADDER): ICD-10-CM

## 2018-10-29 DIAGNOSIS — H04.123 DRY EYES: ICD-10-CM

## 2018-10-29 DIAGNOSIS — R42 DIZZINESS: ICD-10-CM

## 2018-10-29 PROCEDURE — 99214 OFFICE O/P EST MOD 30 MIN: CPT | Performed by: INTERNAL MEDICINE

## 2018-10-29 NOTE — LETTER
October 29, 2018        Janeen Ritter    Above is not diabetic.   Last a1c 5.7 on no glycemic control agents.  Holding Ditropan as possible cause of dry eye.                          Nico Garcia MD

## 2018-10-29 NOTE — PROGRESS NOTES
CC: Dizziness, overactive bladder, dry eyes.    HPI:   Janeen presents today with the following.    1. Dizziness  Presents with this fairly poor historical recounting of dizziness.  She reports that typically when standing up first thing in the morning and sometimes late at night.  She describes it is on balance but cannot nail it down to passing out versus spinning sensation.  She denies any chest pain or palpitations.  May have been partially associated with baclofen which she has since stopped.    2. Dry eyes  She was recently seen by eye doctor who thought she was diabetic but is never proven to be so.  She is on multiple medications including antihistamines which were stopped does not report improvement in her dry eyes.    3. OAB (overactive bladder)  She does suffer from overactive bladder.      Patient Active Problem List    Diagnosis Date Noted   • [place] chorea ([facility]) 01/11/2010     Priority: High   • Chronic kidney disease, stage III (moderate) ([facility]) 04/16/2015     Priority: Medium   • Risk for falls 12/20/2017   • Hot flashes 03/02/2017   • Mixed incontinence 03/02/2017   • Recurrent major depressive disorder, in partial remission ([facility]) 12/30/2016   • Asthma in adult 12/30/2016   • IGT (impaired glucose tolerance) 02/22/2013   • GERD (gastroesophageal reflux disease) 01/11/2010   • Dyslipidemia 01/11/2010   • HTN (hypertension), benign 01/11/2010   • Glaucoma 01/11/2010       Current Outpatient Prescriptions   Medication Sig Dispense Refill   • Mirabegron ER 50 MG TABLET SR 24 HR Take 50 mg by mouth every day. 90 Tab 3   • omeprazole (PRILOSEC) 40 MG delayed-release capsule TAKE ONE CAPSULE BY MOUTH ONCE DAILY 90 Cap 3   • sumatriptan (IMITREX) 100 MG tablet Take 0.5-1 Tabs by mouth Once PRN for Migraine for up to 1 dose. 10 Tab 3   • sertraline (ZOLOFT) 50 MG Tab Take 1 Tab by mouth every day. 90 Tab 3   • gabapentin (NEURONTIN) 300 MG Cap Take 2 Caps by mouth 3 times a day. (Patient taking  "differently: Take 600 mg by mouth 2 Times a Day.) 180 Cap 1   • trazodone (DESYREL) 50 MG Tab Take 1 Tab by mouth every bedtime. 90 Tab 3   • estradiol (ESTRACE) 0.5 MG tablet Take 1 Tab by mouth every day. 90 Tab 3   • atorvastatin (LIPITOR) 80 MG tablet Take 1 Tab by mouth every evening. 90 Tab 3   • Cholecalciferol (VITAMIN D3) 3000 UNITS Tab Take  by mouth.     • Multiple Vitamins-Minerals (HAIR/SKIN/NAILS PO) Take  by mouth.     • artificial tear ointment (REFRESH,LACRI-LUBE) Ointment Place 1 Application in both eyes every 8 hours.     • Loteprednol Etabonate (LOTEMAX) 0.5 % Ointment by Ophthalmic route.     • fluticasone (FLONASE) 50 MCG/ACT nasal spray Spray 1 Spray in nose every day. 16 g 11   • PROAIR  (90 BASE) MCG/ACT AERS inhalation aerosol Inhale 2 Puffs by mouth every 6 hours as needed for Shortness of Breath. 3 Inhaler 3   • vitamin D, Ergocalciferol, (DRISDOL) 70299 UNIT CAPS capsule Take 1 Cap by mouth every 7 days. 12 Cap 3   • aspirin 81 MG tablet Take 81 mg by mouth every day.     • Calcium Carbonate-Vitamin D (CALTRATE 600+D PO) Take  by mouth every day.       No current facility-administered medications for this visit.          Allergies as of 10/29/2018 - Reviewed 10/29/2018   Allergen Reaction Noted   • Other misc Shortness of Breath 12/03/2010   • Morphine Anaphylaxis 07/21/2017   • Soap Rash 12/03/2010        ROS: Denies Chest pain, SOB, LE edema.    /68   Pulse 61   Temp 36.2 °C (97.1 °F) (Temporal)   Ht 1.549 m (5' 1\")   Wt 57 kg (125 lb 10.6 oz)   SpO2 95%   BMI 23.74 kg/m²     Physical Exam:  Gen:         Alert and oriented, No apparent distress.  Neck:        No Lymphadenopathy or Bruits.  Lungs:     Clear to auscultation bilaterally  CV:          Regular rate and rhythm. No murmurs, rubs or gallops.               Ext:          No clubbing, cyanosis, edema.      Assessment and Plan.   68 y.o. female with the following issues.    1. Dizziness  Unclear whether " cardiogenic versus more balance have written for ultrasound of carotids.  Follow-up for abnormalities  - US-CAROTID DOPPLER BILAT; Future    2. Dry eyes  Have stop Ditropan to see if it is playing into her dry eyes.    3. OAB (overactive bladder)  Have written for alternative agent after stopping Ditropan to see if it improves situation.

## 2018-10-31 ENCOUNTER — HOSPITAL ENCOUNTER (OUTPATIENT)
Dept: RADIOLOGY | Facility: MEDICAL CENTER | Age: 68
End: 2018-10-31
Attending: INTERNAL MEDICINE
Payer: MEDICARE

## 2018-10-31 DIAGNOSIS — R42 DIZZINESS: ICD-10-CM

## 2018-10-31 PROCEDURE — 93880 EXTRACRANIAL BILAT STUDY: CPT

## 2018-12-29 DIAGNOSIS — E78.5 DYSLIPIDEMIA: Chronic | ICD-10-CM

## 2018-12-31 RX ORDER — ATORVASTATIN CALCIUM 80 MG/1
TABLET, FILM COATED ORAL
Qty: 90 TAB | Refills: 3 | Status: SHIPPED | OUTPATIENT
Start: 2018-12-31 | End: 2019-07-01 | Stop reason: SDUPTHER

## 2018-12-31 RX ORDER — ESTRADIOL 0.5 MG/1
TABLET ORAL
Qty: 90 TAB | Refills: 3 | Status: SHIPPED | OUTPATIENT
Start: 2018-12-31 | End: 2020-01-17 | Stop reason: SDUPTHER

## 2019-01-14 ENCOUNTER — HOSPITAL ENCOUNTER (OUTPATIENT)
Dept: LAB | Facility: MEDICAL CENTER | Age: 69
End: 2019-01-14
Attending: INTERNAL MEDICINE
Payer: MEDICARE

## 2019-01-14 DIAGNOSIS — R73.02 IGT (IMPAIRED GLUCOSE TOLERANCE): ICD-10-CM

## 2019-01-14 DIAGNOSIS — E78.5 DYSLIPIDEMIA: Chronic | ICD-10-CM

## 2019-01-14 LAB
ALBUMIN SERPL BCP-MCNC: 4.8 G/DL (ref 3.2–4.9)
ALBUMIN/GLOB SERPL: 1.5 G/DL
ALP SERPL-CCNC: 52 U/L (ref 30–99)
ALT SERPL-CCNC: 18 U/L (ref 2–50)
ANION GAP SERPL CALC-SCNC: 10 MMOL/L (ref 0–11.9)
AST SERPL-CCNC: 21 U/L (ref 12–45)
BILIRUB SERPL-MCNC: 0.8 MG/DL (ref 0.1–1.5)
BUN SERPL-MCNC: 17 MG/DL (ref 8–22)
CALCIUM SERPL-MCNC: 9.5 MG/DL (ref 8.5–10.5)
CHLORIDE SERPL-SCNC: 104 MMOL/L (ref 96–112)
CHOLEST SERPL-MCNC: 177 MG/DL (ref 100–199)
CO2 SERPL-SCNC: 26 MMOL/L (ref 20–33)
CREAT SERPL-MCNC: 0.89 MG/DL (ref 0.5–1.4)
EST. AVERAGE GLUCOSE BLD GHB EST-MCNC: 126 MG/DL
GLOBULIN SER CALC-MCNC: 3.3 G/DL (ref 1.9–3.5)
GLUCOSE SERPL-MCNC: 109 MG/DL (ref 65–99)
HBA1C MFR BLD: 6 % (ref 0–5.6)
HDLC SERPL-MCNC: 53 MG/DL
LDLC SERPL CALC-MCNC: 89 MG/DL
POTASSIUM SERPL-SCNC: 3.8 MMOL/L (ref 3.6–5.5)
PROT SERPL-MCNC: 8.1 G/DL (ref 6–8.2)
SODIUM SERPL-SCNC: 140 MMOL/L (ref 135–145)
TRIGL SERPL-MCNC: 176 MG/DL (ref 0–149)

## 2019-01-14 PROCEDURE — 80061 LIPID PANEL: CPT

## 2019-01-14 PROCEDURE — 83036 HEMOGLOBIN GLYCOSYLATED A1C: CPT

## 2019-01-14 PROCEDURE — 80053 COMPREHEN METABOLIC PANEL: CPT

## 2019-01-14 PROCEDURE — 36415 COLL VENOUS BLD VENIPUNCTURE: CPT

## 2019-01-16 ENCOUNTER — OFFICE VISIT (OUTPATIENT)
Dept: MEDICAL GROUP | Facility: MEDICAL CENTER | Age: 69
End: 2019-01-16
Payer: MEDICARE

## 2019-01-16 VITALS
HEART RATE: 63 BPM | OXYGEN SATURATION: 96 % | BODY MASS INDEX: 23.41 KG/M2 | DIASTOLIC BLOOD PRESSURE: 60 MMHG | SYSTOLIC BLOOD PRESSURE: 116 MMHG | TEMPERATURE: 97.6 F | HEIGHT: 61 IN | WEIGHT: 124 LBS

## 2019-01-16 DIAGNOSIS — I10 HTN (HYPERTENSION), BENIGN: Chronic | ICD-10-CM

## 2019-01-16 DIAGNOSIS — Z12.31 VISIT FOR SCREENING MAMMOGRAM: ICD-10-CM

## 2019-01-16 DIAGNOSIS — E78.5 DYSLIPIDEMIA: Chronic | ICD-10-CM

## 2019-01-16 DIAGNOSIS — Z00.00 MEDICARE ANNUAL WELLNESS VISIT, SUBSEQUENT: ICD-10-CM

## 2019-01-16 DIAGNOSIS — N39.46 MIXED INCONTINENCE: ICD-10-CM

## 2019-01-16 DIAGNOSIS — R73.02 IGT (IMPAIRED GLUCOSE TOLERANCE): ICD-10-CM

## 2019-01-16 DIAGNOSIS — H91.93 BILATERAL HEARING LOSS, UNSPECIFIED HEARING LOSS TYPE: ICD-10-CM

## 2019-01-16 DIAGNOSIS — K21.9 GASTROESOPHAGEAL REFLUX DISEASE WITHOUT ESOPHAGITIS: Chronic | ICD-10-CM

## 2019-01-16 DIAGNOSIS — F33.41 RECURRENT MAJOR DEPRESSIVE DISORDER, IN PARTIAL REMISSION (HCC): ICD-10-CM

## 2019-01-16 DIAGNOSIS — Z91.81 RISK FOR FALLS: ICD-10-CM

## 2019-01-16 DIAGNOSIS — J45.20 MILD INTERMITTENT ASTHMA IN ADULT WITHOUT COMPLICATION: ICD-10-CM

## 2019-01-16 DIAGNOSIS — G10 HUNTINGTON CHOREA (HCC): Chronic | ICD-10-CM

## 2019-01-16 DIAGNOSIS — Z12.11 SCREEN FOR COLON CANCER: ICD-10-CM

## 2019-01-16 PROCEDURE — 8041 PR SCP AHA: Performed by: INTERNAL MEDICINE

## 2019-01-16 PROCEDURE — 99213 OFFICE O/P EST LOW 20 MIN: CPT | Mod: 25 | Performed by: INTERNAL MEDICINE

## 2019-01-16 PROCEDURE — G0439 PPPS, SUBSEQ VISIT: HCPCS | Performed by: INTERNAL MEDICINE

## 2019-01-16 RX ORDER — BACLOFEN 10 MG/1
10 TABLET ORAL 3 TIMES DAILY
COMMUNITY
End: 2019-07-12

## 2019-01-16 RX ORDER — HYDROXYZINE 50 MG/1
50 TABLET, FILM COATED ORAL 3 TIMES DAILY PRN
COMMUNITY
End: 2019-07-12

## 2019-01-16 RX ORDER — CELECOXIB 200 MG/1
200 CAPSULE ORAL 2 TIMES DAILY
COMMUNITY
End: 2019-07-12

## 2019-01-16 RX ORDER — OXYBUTYNIN CHLORIDE 5 MG/1
5 TABLET ORAL 2 TIMES DAILY
Qty: 180 TAB | Refills: 3
Start: 2019-01-16 | End: 2020-01-17

## 2019-01-16 RX ORDER — SUMATRIPTAN 100 MG/1
50-100 TABLET, FILM COATED ORAL
Qty: 10 TAB | Refills: 3 | Status: SHIPPED | OUTPATIENT
Start: 2019-01-16 | End: 2019-07-12

## 2019-01-16 RX ORDER — SERTRALINE HYDROCHLORIDE 100 MG/1
100 TABLET, FILM COATED ORAL DAILY
Qty: 90 TAB | Refills: 3 | Status: SHIPPED | OUTPATIENT
Start: 2019-01-16 | End: 2023-10-31 | Stop reason: SDUPTHER

## 2019-01-16 ASSESSMENT — ACTIVITIES OF DAILY LIVING (ADL): BATHING_REQUIRES_ASSISTANCE: 0

## 2019-01-16 ASSESSMENT — PATIENT HEALTH QUESTIONNAIRE - PHQ9
CLINICAL INTERPRETATION OF PHQ2 SCORE: 1
5. POOR APPETITE OR OVEREATING: 3 - NEARLY EVERY DAY
SUM OF ALL RESPONSES TO PHQ QUESTIONS 1-9: 14

## 2019-01-16 ASSESSMENT — ENCOUNTER SYMPTOMS: GENERAL WELL-BEING: FAIR

## 2019-01-16 NOTE — PROGRESS NOTES
Annual Health Assessment Questions:    1.  Are you currently engaging in any exercise or physical activity? Yes    2.  How would you describe your mood or emotional well-being today? fair    3.  Have you had any falls in the last year? No    4.  Have you noticed any problems with your balance or had difficulty walking? Yes    5.  In the last six months have you experienced any leakage of urine? Yes    6. DPA/Advanced Directive: Patient does not have an Advanced Directive.  A packet and workshop information was given on Advanced Directives.        CC: Follow-up depression, blood sugars, due for wellness examination.    HPI:   Janeen presents today with the following.      1. Medicare annual wellness visit, subsequent  Screenings performed below information given on advanced directives.    2. Recurrent major depressive disorder, in partial remission (HCC)  Screening performed below mood is.  She is already on Zoloft interested in increasing dose to try and help with mood.  Denies any suicidal ideation.    3. IGT (impaired glucose tolerance)  Also due for recheck of blood sugars recently sent to the lab.  A1c comes back at 6.0.  Currently not on any medications.  She is trying to watch her diet weight is stable since 3 months ago.  No family history of diabetes      Information for advance directives given to patient or instructed to bring in advance directives into to office to put in chart.      Depression Screening    Little interest or pleasure in doing things?  0 - not at all  Feeling down, depressed , or hopeless? 1 - several days  Trouble falling or staying asleep, or sleeping too much?  1 - several days  Feeling tired or having little energy?  3 - nearly every day  Poor appetite or overeating?  3 - nearly every day  Feeling bad about yourself - or that you are a failure or have let yourself or your family down? 0 - not at all  Trouble concentrating on things, such as reading the newspaper or watching  television? 3 - nearly every day  Moving or speaking so slowly that other people could have noticed.  Or the opposite - being so fidgety or restless that you have been moving around a lot more than usual?  3 - nearly every day  Thoughts that you would be better off dead, or of hurting yourself?  0 - not at all  Patient Health Questionnaire Score: 14    If depressive symptoms identified deferred to follow up visit unless specifically addressed in assessment and plan.    Interpretation of PHQ-9 Total Score   Score Severity   1-4 No Depression   5-9 Mild Depression   10-14 Moderate Depression   15-19 Moderately Severe Depression   20-27 Severe Depression      Screening for Cognitive Impairment    Three Minute Recall (leader, season, table) 1/3    Chris clock face with all 12 numbers and set the hands to show 10 past 11.  No    Cognitive concerns identified deferred for follow up unless specifically addressed in assessment and plan.    Fall Risk Assessment    Has the patient had two or more falls in the last year or any fall with injury in the last year?  Yes    Safety Assessment    Throw rugs on floor.  Yes  Handrails on all stairs.  Yes  Good lighting in all hallways.  Yes  Difficulty hearing.  Yes  Patient counseled about all safety risks that were identified.    Functional Assessment ADLs    Are there any barriers preventing you from cooking for yourself or meeting nutritional needs?  Yes.    Are there any barriers preventing you from driving safely or obtaining transportation?  No.    Are there any barriers preventing you from using a telephone or calling for help?  No.    Are there any barriers preventing you from shopping?  No.    Are there any barriers preventing you from taking care of your own finances?  No.    Are there any barriers preventing you from managing your medications?  No.    Are there any barriers preventing you from showering, bathing or dressing yourself? No.    Are you currently engaging in any  exercise or physical activity?  Yes.     What is your perception of your health?  Fair.      Health Maintenance Summary                MAMMOGRAM Overdue 12/20/2018      Done 12/20/2017 MA-MAMMO SCREENING BILAT W/TOMOSYNTHESIS W/CAD     Patient has more history with this topic...    Annual Wellness Visit Overdue 12/21/2018      Done 12/20/2017 Visit Dx: Medicare annual wellness visit, subsequent     Patient has more history with this topic...    COLON CANCER SCREENING ANNUAL FIT Overdue 1/10/2019      Done 1/10/2018 OCCULT BLOOD FECES IMMUNOASSAY    IMM ZOSTER VACCINES Postponed 9/8/2033 Originally 7/18/2001. Insurance/Financial    IMM PNEUMOCOCCAL 65+ (ADULT) LOW/MEDIUM RISK SERIES Next Due 4/6/2021      Done 6/2/2017 Imm Admin: Pneumococcal Conjugate Vaccine (Prevnar/PCV-13)     Patient has more history with this topic...    BONE DENSITY Next Due 1/11/2022      Done 1/11/2017 DS-BONE DENSITY STUDY (DEXA)     Patient has more history with this topic...    IMM DTaP/Tdap/Td Vaccine Next Due 11/2/2026      Done 11/2/2016 Imm Admin: Tdap Vaccine     Patient has more history with this topic...          Patient Care Team:  Nico Garcia M.D. as PCP - General  Peace Vail M.D. as Consulting Physician (Ophthalmology)          Health Care Screening: Age-appropriate preventive services that Medicare covers were discussed today and ordered as indicated and agreed upon by the patient, and as recommended by USPTF and ACIP.     Patient Active Problem List    Diagnosis Date Noted   • [place] chorea (HCC) 01/11/2010     Priority: High   • Risk for falls 12/20/2017   • Hot flashes 03/02/2017   • Mixed incontinence 03/02/2017   • Recurrent major depressive disorder, in partial remission (HCC) 12/30/2016   • Asthma in adult 12/30/2016   • IGT (impaired glucose tolerance) 02/22/2013   • GERD (gastroesophageal reflux disease) 01/11/2010   • Dyslipidemia 01/11/2010   • HTN (hypertension), benign 01/11/2010   • Glaucoma 01/11/2010        Current Outpatient Prescriptions   Medication Sig Dispense Refill   • hydrOXYzine HCl (ATARAX) 50 MG Tab Take 50 mg by mouth 3 times a day as needed for Itching.     • celecoxib (CELEBREX) 200 MG Cap Take 200 mg by mouth 2 times a day.     • baclofen (LIORESAL) 10 MG Tab Take 10 mg by mouth 3 times a day.     • sertraline (ZOLOFT) 100 MG Tab Take 1 Tab by mouth every day. 90 Tab 3   • atorvastatin (LIPITOR) 80 MG tablet TAKE ONE TABLET BY MOUTH ONCE DAILY IN THE EVENING 90 Tab 3   • estradiol (ESTRACE) 0.5 MG tablet TAKE ONE TABLET BY MOUTH ONCE DAILY 90 Tab 3   • omeprazole (PRILOSEC) 40 MG delayed-release capsule TAKE ONE CAPSULE BY MOUTH ONCE DAILY 90 Cap 3   • sumatriptan (IMITREX) 100 MG tablet Take 0.5-1 Tabs by mouth Once PRN for Migraine for up to 1 dose. 10 Tab 3   • gabapentin (NEURONTIN) 300 MG Cap Take 2 Caps by mouth 3 times a day. (Patient taking differently: Take 600 mg by mouth 2 Times a Day.) 180 Cap 1   • trazodone (DESYREL) 50 MG Tab Take 1 Tab by mouth every bedtime. 90 Tab 3   • Cholecalciferol (VITAMIN D3) 3000 UNITS Tab Take  by mouth.     • Multiple Vitamins-Minerals (HAIR/SKIN/NAILS PO) Take  by mouth.     • artificial tear ointment (REFRESH,LACRI-LUBE) Ointment Place 1 Application in both eyes every 8 hours.     • Loteprednol Etabonate (LOTEMAX) 0.5 % Ointment by Ophthalmic route.     • PROAIR  (90 BASE) MCG/ACT AERS inhalation aerosol Inhale 2 Puffs by mouth every 6 hours as needed for Shortness of Breath. 3 Inhaler 3   • aspirin 81 MG tablet Take 81 mg by mouth every day.     • Calcium Carbonate-Vitamin D (CALTRATE 600+D PO) Take  by mouth every day.     • Mirabegron ER 50 MG TABLET SR 24 HR Take 50 mg by mouth every day. (Patient not taking: Reported on 1/16/2019) 90 Tab 3   • fluticasone (FLONASE) 50 MCG/ACT nasal spray Spray 1 Spray in nose every day. (Patient not taking: Reported on 1/16/2019) 16 g 11   • vitamin D, Ergocalciferol, (DRISDOL) 69602 UNIT CAPS capsule Take 1  Cap by mouth every 7 days. (Patient not taking: Reported on 1/16/2019) 12 Cap 3     No current facility-administered medications for this visit.        Family History   Problem Relation Age of Onset   • Heart Disease Mother    • Hypertension Mother    • Other Mother         GERD   • Arthritis Mother    • Hyperlipidemia Mother    • Hypertension Father    • Heart Disease Father    • Hyperlipidemia Father    • Lung Disease Father         Chronic bronchitis/non-smoker   • Heart Attack Father    • Stroke Father    • Heart Disease Sister    • Hyperlipidemia Sister    • Hypertension Sister    • Arthritis Sister    • Dementia Sister    • Psychiatry Brother    • Lung Disease Brother         Agent orange   • Cancer Sister         lymph node, rids and GI   • Allergies Sister    • Arthritis Sister         RA   • Heart Disease Sister    • Stroke Sister    • Other Sister         hypoglycemia/ulcers/hole in eye       Social History     Social History   • Marital status:      Spouse name: N/A   • Number of children: N/A   • Years of education: N/A     Occupational History   • Not on file.     Social History Main Topics   • Smoking status: Passive Smoke Exposure - Never Smoker   • Smokeless tobacco: Never Used   • Alcohol use Yes      Comment: 3 drinks per month   • Drug use: No   • Sexual activity: No     Other Topics Concern   • Not on file     Social History Narrative   • No narrative on file       Past Surgical History:   Procedure Laterality Date   • SHOULDER DECOMPRESSION ARTHROSCOPIC  12/7/2010    Performed by NEW RODRIGUEZ at SURGERY [facility] ORS   • CLAVICLE DISTAL EXCISION  12/7/2010    Performed by NEW RODRIGUEZ at [facility] ORS   • SHOULDER ARTHROSCOPY W/ ROTATOR CUFF REPAIR  12/7/2010    Performed by NEW RODRIGUEZ at [facility] ORS   • HYSTERECTOMY, TOTAL ABDOMINAL  1985   • BREAST BIOPSY  1975    left   • CHOLECYSTECTOMY  1974   • US-NEEDLE CORE BX-BREAST PANEL    "      Allergies as of 01/16/2019 - Reviewed 01/16/2019   Allergen Reaction Noted   • Other misc Shortness of Breath 12/03/2010   • Morphine Anaphylaxis 07/21/2017   • Soap Rash 12/03/2010        ROS: Denies Chest pain, SOB, LE edema.    /60 (BP Location: Left arm, Patient Position: Sitting)   Pulse 63   Temp 36.4 °C (97.6 °F)   Ht 1.549 m (5' 1\")   Wt 56.2 kg (124 lb)   SpO2 96%   BMI 23.43 kg/m²     Physical Exam:  Gen:         Alert and oriented, No apparent distress.  Neck:        No Lymphadenopathy or Bruits.  Lungs:     Clear to auscultation bilaterally  CV:          Regular rate and rhythm. No murmurs, rubs or gallops.  Abd:         Soft non tender, non distended. Normal active bowel sounds.  No  Hepatosplenomegaly, No pulsatile masses.                   Ext:          No clubbing, cyanosis, edema.      Assessment and Plan.   68 y.o. female with the following issues.    1. Medicare annual wellness visit, subsequent  Discussed healthy lifestyle habits as well as screening regimens.    2. Recurrent major depressive disorder, in partial remission (HCC)  Increasing Zoloft to 100 mg cautioned about side effects will see back in 3 months.  - Patient has been identified as being depressed and appropriate orders and counseling have been given      3. IGT (impaired glucose tolerance)  Discussion about diet exercise.    4. [place] chorea (HCC)  Carries a diagnosis from neurology several years ago no obvious findings on exam have referred back to neurology for further evaluation.  - Subsequent Annual Wellness Visit - Includes PPPS ()    5. HTN (hypertension), benign  Currently well controlled, Discuss diet, exercise and salt restriction.  No change to medication therapy.  - Subsequent Annual Wellness Visit - Includes PPPS ()    6. Dyslipidemia  Lipids currently well controlled. Discussed continued diet and exercise recheck 6 months to 1 year.  - Subsequent Annual Wellness Visit - Includes PPPS " ()    7. Gastroesophageal reflux disease without esophagitis  Stay  - Subsequent Annual Wellness Visit - Includes PPPS ()    8. Risk for falls  Precautions given  - Patient identified as fall risk.  Appropriate orders and counseling given.  - Subsequent Annual Wellness Visit - Includes PPPS ()    9. Mixed incontinence  Ready on medication with good effect.  - Subsequent Annual Wellness Visit - Includes PPPS ()    10. Mild intermittent asthma in adult without complication  Stable no change to therapy  - Subsequent Annual Wellness Visit - Includes PPPS ()      Referrals offered: Community-based lifestyle interventions to reduce health risks and promote self-management and wellness, fall prevention, nutrition, physical activity, tobacco-use cessation, weight loss, and mental health services as per orders if indicated.    Discussion today about general wellness and lifestyle habits:    · Prevent falls and reduce trip hazards; Cautioned about securing or removing rugs.  · Have a working fire alarm and carbon monoxide detector;   · Engage in regular physical activity and social activities

## 2019-01-17 ENCOUNTER — HOSPITAL ENCOUNTER (OUTPATIENT)
Facility: MEDICAL CENTER | Age: 69
End: 2019-01-17
Attending: INTERNAL MEDICINE
Payer: MEDICARE

## 2019-01-17 PROCEDURE — 82274 ASSAY TEST FOR BLOOD FECAL: CPT

## 2019-01-22 DIAGNOSIS — Z12.11 SCREEN FOR COLON CANCER: ICD-10-CM

## 2019-01-26 LAB — HEMOCCULT STL QL IA: NEGATIVE

## 2019-01-28 ENCOUNTER — TELEPHONE (OUTPATIENT)
Dept: MEDICAL GROUP | Facility: MEDICAL CENTER | Age: 69
End: 2019-01-28

## 2019-03-04 RX ORDER — TRAZODONE HYDROCHLORIDE 50 MG/1
TABLET ORAL
Qty: 90 TAB | Refills: 3 | Status: SHIPPED | OUTPATIENT
Start: 2019-03-04 | End: 2020-05-04

## 2019-04-01 DIAGNOSIS — G10 HUNTINGTON CHOREA (HCC): Chronic | ICD-10-CM

## 2019-04-01 RX ORDER — HYDROXYZINE PAMOATE 50 MG/1
CAPSULE ORAL
Qty: 90 CAP | Refills: 3 | Status: SHIPPED | OUTPATIENT
Start: 2019-04-01 | End: 2020-01-17 | Stop reason: SDUPTHER

## 2019-05-21 DIAGNOSIS — M54.42 CHRONIC BILATERAL LOW BACK PAIN WITH LEFT-SIDED SCIATICA: ICD-10-CM

## 2019-05-21 DIAGNOSIS — M25.50 ARTHRALGIA, UNSPECIFIED JOINT: ICD-10-CM

## 2019-05-21 DIAGNOSIS — G89.29 CHRONIC BILATERAL LOW BACK PAIN WITH LEFT-SIDED SCIATICA: ICD-10-CM

## 2019-05-21 RX ORDER — GABAPENTIN 300 MG/1
CAPSULE ORAL
Qty: 270 CAP | Refills: 6 | Status: SHIPPED | OUTPATIENT
Start: 2019-05-21 | End: 2019-07-12

## 2019-05-21 RX ORDER — CELECOXIB 200 MG/1
CAPSULE ORAL
Qty: 90 CAP | Refills: 3 | Status: SHIPPED | OUTPATIENT
Start: 2019-05-21 | End: 2020-06-03

## 2019-05-22 RX ORDER — BACLOFEN 10 MG/1
TABLET ORAL
Qty: 90 TAB | Refills: 6 | Status: SHIPPED | OUTPATIENT
Start: 2019-05-22 | End: 2020-08-28

## 2019-06-24 RX ORDER — HYDROXYZINE 50 MG/1
TABLET, FILM COATED ORAL
Qty: 90 TAB | Refills: 3 | Status: SHIPPED | OUTPATIENT
Start: 2019-06-24 | End: 2019-07-12

## 2019-07-01 DIAGNOSIS — E78.5 DYSLIPIDEMIA: Chronic | ICD-10-CM

## 2019-07-01 RX ORDER — ATORVASTATIN CALCIUM 80 MG/1
TABLET, FILM COATED ORAL
Qty: 100 TAB | Refills: 3 | Status: SHIPPED | OUTPATIENT
Start: 2019-07-01 | End: 2020-10-05

## 2019-07-01 NOTE — TELEPHONE ENCOUNTER
Was the patient seen in the last year in this department? Yes    Does patient have an active prescription for medications requested? No     Received Request Via: Pharmacy     100 day refill request.

## 2019-07-12 ENCOUNTER — HOSPITAL ENCOUNTER (OUTPATIENT)
Dept: LAB | Facility: MEDICAL CENTER | Age: 69
End: 2019-07-12
Attending: INTERNAL MEDICINE
Payer: MEDICARE

## 2019-07-12 ENCOUNTER — OFFICE VISIT (OUTPATIENT)
Dept: MEDICAL GROUP | Facility: MEDICAL CENTER | Age: 69
End: 2019-07-12
Payer: MEDICARE

## 2019-07-12 VITALS
SYSTOLIC BLOOD PRESSURE: 116 MMHG | HEART RATE: 60 BPM | OXYGEN SATURATION: 96 % | DIASTOLIC BLOOD PRESSURE: 60 MMHG | HEIGHT: 61 IN | WEIGHT: 124 LBS | BODY MASS INDEX: 23.41 KG/M2 | TEMPERATURE: 97.1 F

## 2019-07-12 DIAGNOSIS — N39.46 MIXED INCONTINENCE: ICD-10-CM

## 2019-07-12 DIAGNOSIS — Z11.59 NEED FOR HEPATITIS C SCREENING TEST: ICD-10-CM

## 2019-07-12 DIAGNOSIS — M79.89 LEG SWELLING: ICD-10-CM

## 2019-07-12 DIAGNOSIS — R19.7 DIARRHEA, UNSPECIFIED TYPE: ICD-10-CM

## 2019-07-12 DIAGNOSIS — R73.02 IGT (IMPAIRED GLUCOSE TOLERANCE): ICD-10-CM

## 2019-07-12 DIAGNOSIS — E78.5 DYSLIPIDEMIA: Chronic | ICD-10-CM

## 2019-07-12 DIAGNOSIS — M54.2 NECK PAIN: ICD-10-CM

## 2019-07-12 DIAGNOSIS — R26.81 GAIT INSTABILITY: ICD-10-CM

## 2019-07-12 DIAGNOSIS — R23.8 SKIN SENSITIVITY: ICD-10-CM

## 2019-07-12 LAB
ALBUMIN SERPL BCP-MCNC: 4.3 G/DL (ref 3.2–4.9)
ALBUMIN/GLOB SERPL: 1.5 G/DL
ALP SERPL-CCNC: 44 U/L (ref 30–99)
ALT SERPL-CCNC: 20 U/L (ref 2–50)
ANION GAP SERPL CALC-SCNC: 7 MMOL/L (ref 0–11.9)
AST SERPL-CCNC: 20 U/L (ref 12–45)
BASOPHILS # BLD AUTO: 0.4 % (ref 0–1.8)
BASOPHILS # BLD: 0.02 K/UL (ref 0–0.12)
BILIRUB SERPL-MCNC: 0.5 MG/DL (ref 0.1–1.5)
BUN SERPL-MCNC: 15 MG/DL (ref 8–22)
CALCIUM SERPL-MCNC: 8.9 MG/DL (ref 8.5–10.5)
CHLORIDE SERPL-SCNC: 106 MMOL/L (ref 96–112)
CO2 SERPL-SCNC: 27 MMOL/L (ref 20–33)
CREAT SERPL-MCNC: 0.82 MG/DL (ref 0.5–1.4)
EOSINOPHIL # BLD AUTO: 0.13 K/UL (ref 0–0.51)
EOSINOPHIL NFR BLD: 2.3 % (ref 0–6.9)
ERYTHROCYTE [DISTWIDTH] IN BLOOD BY AUTOMATED COUNT: 42.2 FL (ref 35.9–50)
GLOBULIN SER CALC-MCNC: 2.9 G/DL (ref 1.9–3.5)
GLUCOSE SERPL-MCNC: 93 MG/DL (ref 65–99)
HBA1C MFR BLD: 5.9 % (ref 0–5.6)
HCT VFR BLD AUTO: 38.4 % (ref 37–47)
HCV AB SER QL: NEGATIVE
HGB BLD-MCNC: 11.9 G/DL (ref 12–16)
IMM GRANULOCYTES # BLD AUTO: 0.01 K/UL (ref 0–0.11)
IMM GRANULOCYTES NFR BLD AUTO: 0.2 % (ref 0–0.9)
INT CON NEG: NEGATIVE
INT CON POS: POSITIVE
LYMPHOCYTES # BLD AUTO: 2.28 K/UL (ref 1–4.8)
LYMPHOCYTES NFR BLD: 40.1 % (ref 22–41)
MCH RBC QN AUTO: 27 PG (ref 27–33)
MCHC RBC AUTO-ENTMCNC: 31 G/DL (ref 33.6–35)
MCV RBC AUTO: 87.3 FL (ref 81.4–97.8)
MONOCYTES # BLD AUTO: 0.51 K/UL (ref 0–0.85)
MONOCYTES NFR BLD AUTO: 9 % (ref 0–13.4)
NEUTROPHILS # BLD AUTO: 2.74 K/UL (ref 2–7.15)
NEUTROPHILS NFR BLD: 48 % (ref 44–72)
NRBC # BLD AUTO: 0 K/UL
NRBC BLD-RTO: 0 /100 WBC
NT-PROBNP SERPL IA-MCNC: 221 PG/ML (ref 0–125)
PLATELET # BLD AUTO: 221 K/UL (ref 164–446)
PMV BLD AUTO: 10.1 FL (ref 9–12.9)
POTASSIUM SERPL-SCNC: 4.1 MMOL/L (ref 3.6–5.5)
PROT SERPL-MCNC: 7.2 G/DL (ref 6–8.2)
RBC # BLD AUTO: 4.4 M/UL (ref 4.2–5.4)
SODIUM SERPL-SCNC: 140 MMOL/L (ref 135–145)
TSH SERPL DL<=0.005 MIU/L-ACNC: 1.32 UIU/ML (ref 0.38–5.33)
WBC # BLD AUTO: 5.7 K/UL (ref 4.8–10.8)

## 2019-07-12 PROCEDURE — 99214 OFFICE O/P EST MOD 30 MIN: CPT | Performed by: INTERNAL MEDICINE

## 2019-07-12 PROCEDURE — 85025 COMPLETE CBC W/AUTO DIFF WBC: CPT

## 2019-07-12 PROCEDURE — 83036 HEMOGLOBIN GLYCOSYLATED A1C: CPT | Performed by: INTERNAL MEDICINE

## 2019-07-12 PROCEDURE — 86803 HEPATITIS C AB TEST: CPT

## 2019-07-12 PROCEDURE — 82784 ASSAY IGA/IGD/IGG/IGM EACH: CPT

## 2019-07-12 PROCEDURE — 80053 COMPREHEN METABOLIC PANEL: CPT

## 2019-07-12 PROCEDURE — 83516 IMMUNOASSAY NONANTIBODY: CPT

## 2019-07-12 PROCEDURE — 84443 ASSAY THYROID STIM HORMONE: CPT

## 2019-07-12 PROCEDURE — 83880 ASSAY OF NATRIURETIC PEPTIDE: CPT

## 2019-07-12 PROCEDURE — 36415 COLL VENOUS BLD VENIPUNCTURE: CPT

## 2019-07-12 NOTE — PROGRESS NOTES
CC: Diarrhea, neck pain and chronic conditions.                                                                                                                                      HPI:   Janeen presents today with the following.    1. Neck pain  She is reporting intermittent neck pain and spasm.  She is use a TENS unit in the past and would like a prescription for the same.    2. Diarrhea, unspecified type  Ports having diarrhea every 3 to 4 days.  No blood in stool or dark tarry stool.  She will take an Imodium and symptoms will resolve only return to days later.  No recent travel or antibiotics    3. IGT (impaired glucose tolerance)  Elevated blood sugars in the past checked in office today 5.9 stable from previous.    4. Mixed incontinence  Maintain on Ditropan is interested in medications without increased risk for dementia.    5. Leg swelling  Porting some intermittent leg swelling orthopnea.  Does improve in the morning.    6. Skin sensitivity  Is reporting sensitivity to skin and would like to see a dermatologist.    7. Dyslipidemia  Maintain on high-dose statin without myalgias    8. Gait instability  Does have difficulty with gait requesting a four-wheel walker.  Denies any recent falls or injury.    9. Need for hepatitis C screening test        Patient Active Problem List    Diagnosis Date Noted   • [place] chorea (HCC) 01/11/2010     Priority: High   • Risk for falls 12/20/2017   • Hot flashes 03/02/2017   • Mixed incontinence 03/02/2017   • Recurrent major depressive disorder, in partial remission (HCC) 12/30/2016   • Asthma in adult 12/30/2016   • IGT (impaired glucose tolerance) 02/22/2013   • GERD (gastroesophageal reflux disease) 01/11/2010   • Dyslipidemia 01/11/2010   • HTN (hypertension), benign 01/11/2010   • Glaucoma 01/11/2010       Current Outpatient Prescriptions   Medication Sig Dispense Refill   • Mirabegron ER (MYRBETRIQ) 50 MG TABLET SR 24 HR Take 50 mg by mouth every day. 90  "Tab 3   • NON SPECIFIED Tens unit 1 Each 0   • NON SPECIFIED rallator  4 wheel walker 1 Each 0   • atorvastatin (LIPITOR) 80 MG tablet TAKE ONE TABLET BY MOUTH ONCE DAILY IN THE EVENING 100 Tab 3   • baclofen (LIORESAL) 10 MG Tab TAKE 1 TABLET BY MOUTH THREE TIMES DAILY 90 Tab 6   • celecoxib (CELEBREX) 200 MG Cap TAKE ONE CAPSULE BY MOUTH ONCE DAILY 90 Cap 3   • hydrOXYzine pamoate (VISTARIL) 50 MG Cap TAKE ONE CAPSULE BY MOUTH THREE TIMES DAILY AS NEEDED FOR ITCHING 90 Cap 3   • traZODone (DESYREL) 50 MG Tab TAKE ONE TABLET BY MOUTH ONCE DAILY AT BEDTIME 90 Tab 3   • sertraline (ZOLOFT) 100 MG Tab Take 1 Tab by mouth every day. 90 Tab 3   • oxybutynin (DITROPAN) 5 MG Tab Take 1 Tab by mouth 2 Times a Day. 180 Tab 3   • estradiol (ESTRACE) 0.5 MG tablet TAKE ONE TABLET BY MOUTH ONCE DAILY 90 Tab 3   • omeprazole (PRILOSEC) 40 MG delayed-release capsule TAKE ONE CAPSULE BY MOUTH ONCE DAILY 90 Cap 3   • gabapentin (NEURONTIN) 300 MG Cap Take 2 Caps by mouth 3 times a day. (Patient taking differently: Take 600 mg by mouth 2 Times a Day.) 180 Cap 1   • Cholecalciferol (VITAMIN D3) 3000 UNITS Tab Take  by mouth.     • Multiple Vitamins-Minerals (HAIR/SKIN/NAILS PO) Take  by mouth.     • artificial tear ointment (REFRESH,LACRI-LUBE) Ointment Place 1 Application in both eyes every 8 hours.     • Calcium Carbonate-Vitamin D (CALTRATE 600+D PO) Take  by mouth every day.       No current facility-administered medications for this visit.          Allergies as of 07/12/2019 - Reviewed 07/12/2019   Allergen Reaction Noted   • Other misc Shortness of Breath 12/03/2010   • Morphine Anaphylaxis 07/21/2017   • Soap Rash 12/03/2010        ROS: Denies Chest pain, SOB, changes to urination  /60 (BP Location: Right arm, Patient Position: Sitting)   Pulse 60   Temp 36.2 °C (97.1 °F)   Ht 1.549 m (5' 1\")   Wt 56.2 kg (124 lb)   SpO2 96%   BMI 23.43 kg/m²     Physical Exam:  Gen:         Alert and oriented, No apparent " distress.  Neck:        No Lymphadenopathy or Bruits.  Lungs:     Clear to auscultation bilaterally  CV:          Regular rate and rhythm. No murmurs, rubs or gallops.               Ext:          No clubbing, cyanosis, trace bilateral edema.      Assessment and Plan.   68 y.o. female with the following issues.    1. Neck pain  Have written for a TENS unit  - NON SPECIFIED; Tens unit  Dispense: 1 Each; Refill: 0    2. Diarrhea, unspecified type  Sending for blood work if no abnormalities found and symptoms persist refer to gastroenterology  - TSH; Future  - T-TRANSGLUTAMINASE (TTG) IGA; Future  - CBC WITH DIFFERENTIAL; Future  - IGA SERUM QUANT; Future  - Comp Metabolic Panel; Future    3. IGT (impaired glucose tolerance)  Stable discussed diet  - POCT Hemoglobin A1C  - HEMOGLOBIN A1C; Future    4. Mixed incontinence  Itching to medication below to rule less than anticholinergic effects.  - Mirabegron ER (MYRBETRIQ) 50 MG TABLET SR 24 HR; Take 50 mg by mouth every day.  Dispense: 90 Tab; Refill: 3    5. Leg swelling  Not overwhelmed sending for blood work follow-up abnormalities  - BTYPE NATRIURETIC PEPTIDE; Future    6. Skin sensitivity  Further Derm  - REFERRAL TO DERMATOLOGY    7. Dyslipidemia  Checks next lab visit.  - Lipid Profile; Future    8. Gait instability  Have written for walker precautions given  - NON SPECIFIED; rallator  4 wheel walker  Dispense: 1 Each; Refill: 0    9. Need for hepatitis C screening test    - HEP C VIRUS ANTIBODY; Future  - HEP C VIRUS ANTIBODY; Future

## 2019-07-14 LAB — IGA SERPL-MCNC: 263 MG/DL (ref 68–408)

## 2019-07-15 LAB — TTG IGA SER IA-ACNC: 0 U/ML (ref 0–3)

## 2019-07-18 ENCOUNTER — PATIENT OUTREACH (OUTPATIENT)
Dept: HEALTH INFORMATION MANAGEMENT | Facility: OTHER | Age: 69
End: 2019-07-18

## 2019-07-18 NOTE — PROGRESS NOTES
Outcome: Left Message  B-day call    Please transfer to Patient Outreach Team at [phone] when patient returns call.      [facility] Verified: yes    Attempt # 1

## 2019-07-25 NOTE — PROGRESS NOTES
Outcome: Pt called as she received my information and needs assistant with a medication. Per pt she wants to put another complain regarding the treatment she gets at [address] [place] [address] pharmacy.Per pt every time she goes they ask for a card but she doesn't know what card the pharmacy is referring to. She has given them her [employer] member number and S[employer] card but that is the wrong card. When she tries to ask for more information the staff there is rude and doesn't give her an answer. Pt tried to know cost for medication Myrbetriq 50 mg and was told they needed the card to give her the information. I advised pt I would call pharmacy to find out what card they are referring to and the cost of the medication. I also asked pt if she would like to switch to a different pharmacy and declined. I advised pt to call me in the future regarding the pharmacy as I can call on her behalf and she doesn't have to deal with rude staff members. Pt doesn't have access to computer so she doesn't have email. Pt asked about the rides. I advised pt that [facility] provided medical Uber rides to and from appointment and pharmacy only at no cost to pt. Pt was grateful [facility] has this service as the  provided by [facility]--at [facility]--is rude to all the people that use the service. Pt also mentioned that she is alone. She lost her  4 years ago and her grandsons are small. She said her son is no where available and her daughter in law lives in [place]. Sent Advance Directive paperwork.     Physical address: [address] [address]     Please transfer to Patient Outreach Team at [phone] when patient returns call.    [facility] Verified: yes    Attempt # 1

## 2019-07-27 NOTE — PROGRESS NOTES
Outcome: Called [facility] pharmacy  They do not have any records asking for additional paper work. Pt just mentioned the medication is too expensive. The cost of the medication went down to $117.50 for 90 days. Per pharmacy they are not sure if they can put a  discount requests to insurance for the specific mediation or to the doctor. However, there is no record of asking for additional card or letter.     I called pt for update and to give her the price for the medication from [facility] and what she can pay if she does the mail RX order. Pt did not answer and I will be calling back Monday at 10 AM.

## 2019-08-12 ENCOUNTER — HOSPITAL ENCOUNTER (EMERGENCY)
Facility: MEDICAL CENTER | Age: 69
End: 2019-08-12
Attending: EMERGENCY MEDICINE
Payer: MEDICARE

## 2019-08-12 ENCOUNTER — APPOINTMENT (OUTPATIENT)
Dept: RADIOLOGY | Facility: MEDICAL CENTER | Age: 69
End: 2019-08-12
Attending: EMERGENCY MEDICINE
Payer: MEDICARE

## 2019-08-12 VITALS
RESPIRATION RATE: 16 BRPM | TEMPERATURE: 97.9 F | WEIGHT: 122.14 LBS | OXYGEN SATURATION: 94 % | BODY MASS INDEX: 23.08 KG/M2 | SYSTOLIC BLOOD PRESSURE: 128 MMHG | HEART RATE: 61 BPM | DIASTOLIC BLOOD PRESSURE: 56 MMHG

## 2019-08-12 DIAGNOSIS — S92.403B: ICD-10-CM

## 2019-08-12 PROCEDURE — 99283 EMERGENCY DEPT VISIT LOW MDM: CPT

## 2019-08-12 PROCEDURE — 73630 X-RAY EXAM OF FOOT: CPT | Mod: LT

## 2019-08-12 SDOH — HEALTH STABILITY: MENTAL HEALTH: HOW MANY STANDARD DRINKS CONTAINING ALCOHOL DO YOU HAVE ON A TYPICAL DAY?: 1 OR 2

## 2019-08-12 SDOH — HEALTH STABILITY: MENTAL HEALTH: HOW OFTEN DO YOU HAVE A DRINK CONTAINING ALCOHOL?: MONTHLY OR LESS

## 2019-08-12 SDOH — HEALTH STABILITY: MENTAL HEALTH: HOW OFTEN DO YOU HAVE 6 OR MORE DRINKS ON ONE OCCASION?: LESS THAN MONTHLY

## 2019-08-12 ASSESSMENT — LIFESTYLE VARIABLES
DO YOU DRINK ALCOHOL: NO
DOES PATIENT WANT TO STOP DRINKING: NO

## 2019-08-12 NOTE — ED TRIAGE NOTES
.Janeen Ritter  .  Chief Complaint   Patient presents with   • Foot Pain     c/o left foot pain s/p glf on set of stairs.    • GLF     c/o glf fall while walking up the stairs to jury duty.      Patient to triage with above complaint. .Blood Pressure : 128/56, Pulse: 61, Respiration: 16, Temperature: 36.6 °C (97.9 °F), Weight: 55.4 kg (122 lb 2.2 oz), Pulse Oximetry: 94 %, O2 Delivery: None (Room Air)    Patient to lobby and instructed to inform staff of any needs.

## 2019-08-12 NOTE — ED PROVIDER NOTES
ED Provider Note    Scribed for Charity Renner M.D. by Kinsey Hung. 8/12/2019, 1:05 PM.    Primary care provider: Nico Garcia M.D.  Means of arrival: Private vehicle   History obtained from: Patient   History limited by: None     CHIEF COMPLAINT  Chief Complaint   Patient presents with   • Foot Pain     c/o left foot pain s/p glf on set of stairs.    • GLF     c/o glf fall while walking up the stairs to jury duty.      HPI  Janeen Ritter is a 69 y.o. female who presents to the Emergency Department for mild to moderate left foot pain onset today. The patient had a ground level fall when she was going up stairs. She does not report any alleviating factors. She denies numbness.     REVIEW OF SYSTEMS  Pertinent positives include left foot pain. Pertinent negatives include no numbness.       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    SURGICAL HISTORY   has a past surgical history that includes breast biopsy (1975); hysterectomy, total abdominal (1985); cholecystectomy (1974); shoulder decompression arthroscopic (12/7/2010); clavicle distal excision (12/7/2010); shoulder arthroscopy w/ rotator cuff repair (12/7/2010); and us-needle core bx-breast panel.    SOCIAL HISTORY  Social History     Tobacco Use   • Smoking status: Passive Smoke Exposure - Never Smoker   • Smokeless tobacco: Never Used   Substance Use Topics   • Alcohol use: Yes     Frequency: Monthly or less     Drinks per  session: 1 or 2     Binge frequency: Less than monthly     Comment: 3 drinks per month   • Drug use: No      Social History     Substance and Sexual Activity   Drug Use No       FAMILY HISTORY  Family History   Problem Relation Age of Onset   • Heart Disease Mother    • Hypertension Mother    • Other Mother         GERD   • Arthritis Mother    • Hyperlipidemia Mother    • Hypertension Father    • Heart Disease Father    • Hyperlipidemia Father    • Lung Disease Father         Chronic bronchitis/non-smoker   • Heart Attack Father    • Stroke Father    • Heart Disease Sister    • Hyperlipidemia Sister    • Hypertension Sister    • Arthritis Sister    • Dementia Sister    • Psychiatric Illness Brother    • Lung Disease Brother         Agent orange   • Cancer Sister         lymph node, rids and GI   • Allergies Sister    • Arthritis Sister         RA   • Heart Disease Sister    • Stroke Sister    • Other Sister         hypoglycemia/ulcers/hole in eye       CURRENT MEDICATIONS  Home Medications     Reviewed by [place] R.N. (Registered Nurse) on 08/12/19 at 1249  Med List Status: Not Addressed   Medication Last Dose Status   artificial tear ointment (REFRESH,LACRI-LUBE) Ointment  Active   atorvastatin (LIPITOR) 80 MG tablet  Active   baclofen (LIORESAL) 10 MG Tab  Active   Calcium Carbonate-Vitamin D (CALTRATE 600+D PO)  Active   celecoxib (CELEBREX) 200 MG Cap  Active   Cholecalciferol (VITAMIN D3) 3000 UNITS Tab  Active   estradiol (ESTRACE) 0.5 MG tablet  Active   gabapentin (NEURONTIN) 300 MG Cap  Active   hydrOXYzine pamoate (VISTARIL) 50 MG Cap  Active   Mirabegron ER (MYRBETRIQ) 50 MG TABLET SR 24 HR  Active   Multiple Vitamins-Minerals (HAIR/SKIN/NAILS PO)  Active   NON SPECIFIED  Active   NON SPECIFIED  Active   omeprazole (PRILOSEC) 40 MG delayed-release capsule  Active   oxybutynin (DITROPAN) 5 MG Tab  Active   sertraline (ZOLOFT) 100 MG Tab  Active   traZODone (DESYREL) 50 MG Tab  Active           "      ALLERGIES  Allergies   Allergen Reactions   • Other Misc Shortness of Breath     \"chemicals such as cleaning agents and fragrance\"   • Morphine Anaphylaxis     Feeling flush and rapid heartbeat   • Soap Rash       PHYSICAL EXAM  VITAL SIGNS: /56   Pulse 61   Temp 36.6 °C (97.9 °F) (Temporal)   Resp 16   Wt 55.4 kg (122 lb 2.2 oz)   SpO2 94%   BMI 23.08 kg/m²     Constitutional: Patient sitting in wheel chair and interactive, Alert and in no apparent distress.  HENT: Normocephalic, Bilateral external ears normal. Nose normal.   Eyes: Pupils are equal and reactive. Conjunctiva normal, non-icteric.   Cardiovascular:  Good Perfusion  Thorax & Lungs: Easy unlabored respirations  Abdomen:  No pain with movement   Skin: Visualized skin is  Dry, No erythema, No rash.   Extremities: Tenderness at great toe and second digit, Moves all extremities   Neurologic: Alert, Grossly non-focal.   Psychiatric: Appropriate Mood    DIAGNOSTIC STUDIES / PROCEDURES    RADIOLOGY  DX-FOOT-COMPLETE 3+ LEFT   Final Result      1.  Nondisplaced intra-articular fracture of the distal phalangeal base of the great toe.           The radiologist's interpretation of all radiological studies have been reviewed by me.    COURSE & MEDICAL DECISION MAKING  Nursing notes and vital signs were reviewed. (See chart for details)     The patient presents with left foot pain, and the differential diagnosis includes but is not limited to bone fracture.       1:05 PM Patient was evaluated at bedside. I informed her of the plan to perform an x-ray. She understands and agrees. Initial orders in the Emergency Department included DX-Foot-Complete Left.    2:05 PM ED testing reveals a fracture to the distal phalangeal base of the patient's left great toe. The patient will be put in a walking boot.    2:10 PM I informed the patient of the fracture and told her that she will be given a walking boot. I told her of the plan for discharge and to return " for any new or worsening symptoms. She understands and agrees.     The patient was discharged home with an information sheet on bone fracture and told to return immediately for any signs or symptoms listed, but specifically if the pain does not diminish or worsens.  The patient agreed to the discharge precautions and follow-up plan which is documented in EPIC.    DISPOSITION:  Patient will be discharged home in stable condition.    FOLLOW UP:  Nico Garcia M.D.  [address]  [address]  [place] [id number] [phone]          Home Mcknight M.D.  [address] [facility] [address]  [phone]        FINAL IMPRESSION  1. Non Displaced unspecified fracture of unspecified great toe         I, Kinsey Hung (Rejiibjennifer), am scribing for, and in the presence of, Charity Renner M.D..    Electronically signed by: Kinsey Hung (Monae), 8/12/2019    I, Charity Renner M.D. personally performed the services described in this documentation, as scribed by Kinsey Hung in my presence, and it is both accurate and complete. E    The note accurately reflects work and decisions made by me.  Charity Renner  8/12/2019  3:25 PM

## 2019-08-15 DIAGNOSIS — K21.9 GASTROESOPHAGEAL REFLUX DISEASE WITHOUT ESOPHAGITIS: Chronic | ICD-10-CM

## 2019-08-16 RX ORDER — OMEPRAZOLE 40 MG/1
CAPSULE, DELAYED RELEASE ORAL
Qty: 90 CAP | Refills: 3 | Status: SHIPPED | OUTPATIENT
Start: 2019-08-16 | End: 2020-08-17

## 2019-12-03 ENCOUNTER — HOSPITAL ENCOUNTER (OUTPATIENT)
Dept: RADIOLOGY | Facility: MEDICAL CENTER | Age: 69
End: 2019-12-03
Attending: INTERNAL MEDICINE
Payer: MEDICARE

## 2019-12-03 DIAGNOSIS — Z12.31 VISIT FOR SCREENING MAMMOGRAM: ICD-10-CM

## 2019-12-04 ENCOUNTER — TELEPHONE (OUTPATIENT)
Dept: MEDICAL GROUP | Facility: MEDICAL CENTER | Age: 69
End: 2019-12-04

## 2019-12-04 DIAGNOSIS — N64.4 BREAST PAIN: ICD-10-CM

## 2019-12-04 NOTE — TELEPHONE ENCOUNTER
1. Caller Name: Ashley                                         Call Back Number: [phone]      Patient approves a detailed voicemail message: N\A    Patient is having R. Breast pain. [facility] with the [facility] and they are requesting an order for a Bilateral Diagnostic Mammogram Chong W/ CAD and including US. Please advise.

## 2019-12-06 ENCOUNTER — PATIENT OUTREACH (OUTPATIENT)
Dept: HEALTH INFORMATION MANAGEMENT | Facility: OTHER | Age: 69
End: 2019-12-06

## 2019-12-06 NOTE — PROGRESS NOTES
Ashley requesting [employer] ride to be scheduled for 12/18/19. Scheduled for pickup at 9:45.  No information available at this time regarding car picking her up. Informed her I would reach out to her with information once appointment gets closer. reminder documented in my calendar.  Ashley agreed with this plan.

## 2019-12-11 RX ORDER — OXYBUTYNIN CHLORIDE 5 MG/1
TABLET ORAL
Qty: 180 TAB | Refills: 3 | Status: SHIPPED | OUTPATIENT
Start: 2019-12-11 | End: 2021-02-16 | Stop reason: SDUPTHER

## 2019-12-18 ENCOUNTER — HOSPITAL ENCOUNTER (OUTPATIENT)
Dept: RADIOLOGY | Facility: MEDICAL CENTER | Age: 69
End: 2019-12-18
Attending: INTERNAL MEDICINE
Payer: MEDICARE

## 2019-12-18 DIAGNOSIS — N64.4 BREAST PAIN: ICD-10-CM

## 2019-12-18 PROCEDURE — G0279 TOMOSYNTHESIS, MAMMO: HCPCS

## 2019-12-18 PROCEDURE — 76642 ULTRASOUND BREAST LIMITED: CPT | Mod: RT

## 2020-01-17 ENCOUNTER — OFFICE VISIT (OUTPATIENT)
Dept: MEDICAL GROUP | Facility: MEDICAL CENTER | Age: 70
End: 2020-01-17
Payer: MEDICARE

## 2020-01-17 VITALS
SYSTOLIC BLOOD PRESSURE: 118 MMHG | OXYGEN SATURATION: 94 % | WEIGHT: 124 LBS | DIASTOLIC BLOOD PRESSURE: 68 MMHG | BODY MASS INDEX: 23.41 KG/M2 | TEMPERATURE: 97.5 F | HEART RATE: 64 BPM | HEIGHT: 61 IN

## 2020-01-17 DIAGNOSIS — R73.02 IGT (IMPAIRED GLUCOSE TOLERANCE): ICD-10-CM

## 2020-01-17 DIAGNOSIS — K21.9 GASTROESOPHAGEAL REFLUX DISEASE WITHOUT ESOPHAGITIS: Chronic | ICD-10-CM

## 2020-01-17 DIAGNOSIS — M54.50 CHRONIC RIGHT-SIDED LOW BACK PAIN WITHOUT SCIATICA: ICD-10-CM

## 2020-01-17 DIAGNOSIS — R13.19 ESOPHAGEAL DYSPHAGIA: ICD-10-CM

## 2020-01-17 DIAGNOSIS — G10 HUNTINGTON CHOREA (HCC): Chronic | ICD-10-CM

## 2020-01-17 DIAGNOSIS — F33.41 RECURRENT MAJOR DEPRESSIVE DISORDER, IN PARTIAL REMISSION (HCC): ICD-10-CM

## 2020-01-17 DIAGNOSIS — E78.5 DYSLIPIDEMIA: Chronic | ICD-10-CM

## 2020-01-17 DIAGNOSIS — J45.20 MILD INTERMITTENT ASTHMA IN ADULT WITHOUT COMPLICATION: ICD-10-CM

## 2020-01-17 DIAGNOSIS — Z91.81 RISK FOR FALLS: ICD-10-CM

## 2020-01-17 DIAGNOSIS — I10 HTN (HYPERTENSION), BENIGN: Chronic | ICD-10-CM

## 2020-01-17 DIAGNOSIS — Z12.11 SCREEN FOR COLON CANCER: ICD-10-CM

## 2020-01-17 DIAGNOSIS — R19.7 DIARRHEA, UNSPECIFIED TYPE: ICD-10-CM

## 2020-01-17 DIAGNOSIS — N39.46 MIXED INCONTINENCE: ICD-10-CM

## 2020-01-17 DIAGNOSIS — Z00.00 MEDICARE ANNUAL WELLNESS VISIT, SUBSEQUENT: ICD-10-CM

## 2020-01-17 DIAGNOSIS — H40.10X0 OPEN-ANGLE GLAUCOMA, UNSPECIFIED GLAUCOMA STAGE, UNSPECIFIED LATERALITY, UNSPECIFIED OPEN-ANGLE GLAUCOMA TYPE: ICD-10-CM

## 2020-01-17 DIAGNOSIS — G89.29 CHRONIC RIGHT-SIDED LOW BACK PAIN WITHOUT SCIATICA: ICD-10-CM

## 2020-01-17 LAB
HBA1C MFR BLD: 5.9 % (ref 0–5.6)
INT CON NEG: NEGATIVE
INT CON POS: POSITIVE

## 2020-01-17 PROCEDURE — 99214 OFFICE O/P EST MOD 30 MIN: CPT | Mod: 25 | Performed by: INTERNAL MEDICINE

## 2020-01-17 PROCEDURE — G0439 PPPS, SUBSEQ VISIT: HCPCS | Performed by: INTERNAL MEDICINE

## 2020-01-17 PROCEDURE — 8041 PR SCP AHA: Performed by: INTERNAL MEDICINE

## 2020-01-17 PROCEDURE — 83036 HEMOGLOBIN GLYCOSYLATED A1C: CPT | Performed by: INTERNAL MEDICINE

## 2020-01-17 RX ORDER — ESTRADIOL 0.5 MG/1
0.5 TABLET ORAL DAILY
Qty: 90 TAB | Refills: 3 | Status: SHIPPED | OUTPATIENT
Start: 2020-01-17 | End: 2022-03-14 | Stop reason: SDUPTHER

## 2020-01-17 RX ORDER — FLUTICASONE PROPIONATE 50 MCG
2 SPRAY, SUSPENSION (ML) NASAL DAILY
Qty: 1 BOTTLE | Refills: 11 | Status: SHIPPED | OUTPATIENT
Start: 2020-01-17 | End: 2021-01-26

## 2020-01-17 RX ORDER — ALBUTEROL SULFATE 90 UG/1
2 AEROSOL, METERED RESPIRATORY (INHALATION) EVERY 6 HOURS PRN
Qty: 8.5 G | Refills: 11 | Status: ON HOLD | OUTPATIENT
Start: 2020-01-17 | End: 2022-08-10

## 2020-01-17 RX ORDER — HYDROXYZINE PAMOATE 50 MG/1
50 CAPSULE ORAL 3 TIMES DAILY PRN
Qty: 270 CAP | Refills: 3 | Status: SHIPPED | OUTPATIENT
Start: 2020-01-17 | End: 2020-07-22

## 2020-01-17 ASSESSMENT — PATIENT HEALTH QUESTIONNAIRE - PHQ9
CLINICAL INTERPRETATION OF PHQ2 SCORE: 3
5. POOR APPETITE OR OVEREATING: 3 - NEARLY EVERY DAY
SUM OF ALL RESPONSES TO PHQ QUESTIONS 1-9: 13

## 2020-01-17 ASSESSMENT — ENCOUNTER SYMPTOMS: GENERAL WELL-BEING: FAIR

## 2020-01-17 ASSESSMENT — ACTIVITIES OF DAILY LIVING (ADL): BATHING_REQUIRES_ASSISTANCE: 0

## 2020-01-17 NOTE — PROGRESS NOTES
Annual Health Assessment Questions:    1.  Are you currently engaging in any exercise or physical activity? Yes    2.  How would you describe your mood or emotional well-being today? good    3.  Have you had any falls in the last year? Yes    4.  Have you noticed any problems with your balance or had difficulty walking? Yes    5.  In the last six months have you experienced any leakage of urine? Yes    6. DPA/Advanced Directive: Patient does not have an Advanced Directive.  A packet and workshop information was given on Advanced Directives.        CC: Blood sugars, back pain, difficulty swallowing and diarrhea.    HPI:   Janeen presents today with the following.      1. Medicare annual wellness visit, subsequent  Screenings performed below information given on advanced directives    2. IGT (impaired glucose tolerance)  Blood sugars elevated in the past did not get blood work completed checked in office today found to be in a reasonable range.  Value of 5.8.    3. Chronic right-sided low back pain without sciatica  Does have chronic back pain seeing pain specialist in the past reporting advancing symptomology without radiculopathy she would like to get back in for evaluation.    4. Esophageal dysphagia  Most concerning complaint today is difficulty with food sticking just below her throat.  She has not had prolonged episodes but has happened on several times mostly bread products.    5. Diarrhea, unspecified type  Persistent intermittent diarrhea recent sprue serologies were negative.  Denies any blood in her stool or dark tarry stool.  She is due for colon screening.    6. Dyslipidemia  Maintain on statin denying myalgia    7. Recurrent major depressive disorder, in partial remission (HCC)  Mood worsening as demonstrated below she would like to get into psychology if possible.  Maintain on medication without side effect      Information for advance directives given to patient or instructed to bring in advance  directives into to office to put in chart.      Depression Screening    Little interest or pleasure in doing things?  0 - not at all  Feeling down, depressed , or hopeless? 3 - nearly every day  Trouble falling or staying asleep, or sleeping too much?  3 - nearly every day  Feeling tired or having little energy?  3 - nearly every day  Poor appetite or overeating?  3 - nearly every day  Feeling bad about yourself - or that you are a failure or have let yourself or your family down? 0 - not at all  Trouble concentrating on things, such as reading the newspaper or watching television? 1 - several days  Moving or speaking so slowly that other people could have noticed.  Or the opposite - being so fidgety or restless that you have been moving around a lot more than usual?  0 - not at all  Thoughts that you would be better off dead, or of hurting yourself?  0 - not at all  Patient Health Questionnaire Score: 13    If depressive symptoms identified deferred to follow up visit unless specifically addressed in assessment and plan.    Interpretation of PHQ-9 Total Score   Score Severity   1-4 No Depression   5-9 Mild Depression   10-14 Moderate Depression   15-19 Moderately Severe Depression   20-27 Severe Depression      Screening for Cognitive Impairment    Three Minute Recall (village, kitchen, baby) 3/3    Chris clock face with all 12 numbers and set the hands to show 10 past 10.  Yes 5/5  Cognitive concerns identified deferred for follow up unless specifically addressed in assessment and plan.    Fall Risk Assessment    Has the patient had two or more falls in the last year or any fall with injury in the last year?  Yes    Safety Assessment    Throw rugs on floor.  Yes  Handrails on all stairs.  Yes  Good lighting in all hallways.  Yes  Difficulty hearing.  Yes  Patient counseled about all safety risks that were identified.    Functional Assessment ADLs    Are there any barriers preventing you from cooking for yourself or  meeting nutritional needs?  Yes.    Are there any barriers preventing you from driving safely or obtaining transportation?  No.    Are there any barriers preventing you from using a telephone or calling for help?  No.    Are there any barriers preventing you from shopping?  Yes.    Are there any barriers preventing you from taking care of your own finances?  No.    Are there any barriers preventing you from managing your medications?  No.    Are there any barriers preventing you from showering, bathing or dressing yourself? No.    Are you currently engaging in any exercise or physical activity?  Yes.     What is your perception of your health?  Fair.      Health Maintenance Summary                COLOGUARD STOOL DNA Overdue 7/18/2001     Annual Wellness Visit Overdue 1/17/2020      Done 1/16/2019 Visit Dx: Medicare annual wellness visit, subsequent     Patient has more history with this topic...    IMM ZOSTER VACCINES Postponed 9/8/2033 Originally 7/18/2001. Insurance/Financial    MAMMOGRAM Next Due 12/18/2020      Done 12/18/2019 MA-DIAGNOSTIC MAMMO BILAT W/TOMOSYNTHESIS W/CAD     Patient has more history with this topic...    IMM PNEUMOCOCCAL VACCINE: 65+ Years Next Due 4/6/2021      Done 6/2/2017 Imm Admin: Pneumococcal Conjugate Vaccine (Prevnar/PCV-13)     Patient has more history with this topic...    BONE DENSITY Next Due 1/11/2022      Done 1/11/2017 DS-BONE DENSITY STUDY (DEXA)     Patient has more history with this topic...    IMM DTaP/Tdap/Td Vaccine Next Due 11/2/2026      Done 11/2/2016 Imm Admin: Tdap Vaccine     Patient has more history with this topic...          Patient Care Team:  Nico Garcia M.D. as PCP - General  Peace Vail M.D. as Consulting Physician (Ophthalmology)  Rakel Gilliland as    Martin Alexandra M.D. as Consulting Physician (Orthopaedics)          Health Care Screening: Age-appropriate preventive services that Medicare covers were discussed today  and ordered as indicated and agreed upon by the patient, and as recommended by USPTF and ACIP.     Patient Active Problem List    Diagnosis Date Noted   • [place] chorea (HCC) 01/11/2010     Priority: High   • Risk for falls 12/20/2017   • Hot flashes 03/02/2017   • Mixed incontinence 03/02/2017   • Recurrent major depressive disorder, in partial remission (HCC) 12/30/2016   • Asthma in adult 12/30/2016   • IGT (impaired glucose tolerance) 02/22/2013   • GERD (gastroesophageal reflux disease) 01/11/2010   • Dyslipidemia 01/11/2010   • HTN (hypertension), benign 01/11/2010   • Glaucoma 01/11/2010       Current Outpatient Medications   Medication Sig Dispense Refill   • estradiol (ESTRACE) 0.5 MG tablet Take 1 Tab by mouth every day. 90 Tab 3   • fluticasone (FLONASE) 50 MCG/ACT nasal spray Spray 2 Sprays in nose every day. 1 Bottle 11   • albuterol 108 (90 Base) MCG/ACT Aero Soln inhalation aerosol Inhale 2 Puffs by mouth every 6 hours as needed. 8.5 g 11   • hydrOXYzine pamoate (VISTARIL) 50 MG Cap Take 1 Cap by mouth 3 times a day as needed. 270 Cap 3   • oxybutynin (DITROPAN) 5 MG Tab TAKE 1 TABLET BY MOUTH TWICE DAILY 180 Tab 3   • omeprazole (PRILOSEC) 40 MG delayed-release capsule TAKE 1 CAPSULE BY MOUTH ONCE DAILY 90 Cap 3   • NON SPECIFIED Tens unit 1 Each 0   • NON SPECIFIED rallator  4 wheel walker 1 Each 0   • atorvastatin (LIPITOR) 80 MG tablet TAKE ONE TABLET BY MOUTH ONCE DAILY IN THE EVENING 100 Tab 3   • baclofen (LIORESAL) 10 MG Tab TAKE 1 TABLET BY MOUTH THREE TIMES DAILY 90 Tab 6   • celecoxib (CELEBREX) 200 MG Cap TAKE ONE CAPSULE BY MOUTH ONCE DAILY 90 Cap 3   • traZODone (DESYREL) 50 MG Tab TAKE ONE TABLET BY MOUTH ONCE DAILY AT BEDTIME 90 Tab 3   • sertraline (ZOLOFT) 100 MG Tab Take 1 Tab by mouth every day. 90 Tab 3   • gabapentin (NEURONTIN) 300 MG Cap Take 2 Caps by mouth 3 times a day. (Patient taking differently: Take 600 mg by mouth 2 Times a Day.) 180 Cap 1   • Cholecalciferol  (VITAMIN D3) 3000 UNITS Tab Take  by mouth.     • Multiple Vitamins-Minerals (HAIR/SKIN/NAILS PO) Take  by mouth.     • artificial tear ointment (REFRESH,LACRI-LUBE) Ointment Place 1 Application in both eyes every 8 hours.     • Calcium Carbonate-Vitamin D (CALTRATE 600+D PO) Take  by mouth every day.       No current facility-administered medications for this visit.        Family History   Problem Relation Age of Onset   • Heart Disease Mother    • Hypertension Mother    • Other Mother         GERD   • Arthritis Mother    • Hyperlipidemia Mother    • Hypertension Father    • Heart Disease Father    • Hyperlipidemia Father    • Lung Disease Father         Chronic bronchitis/non-smoker   • Heart Attack Father    • Stroke Father    • Heart Disease Sister    • Hyperlipidemia Sister    • Hypertension Sister    • Arthritis Sister    • Dementia Sister    • Psychiatric Illness Brother    • Lung Disease Brother         Agent orange   • Cancer Sister         lymph node, rids and GI   • Allergies Sister    • Arthritis Sister         RA   • Heart Disease Sister    • Stroke Sister    • Other Sister         hypoglycemia/ulcers/hole in eye       Social History     Socioeconomic History   • Marital status:      Spouse name: Not on file   • Number of children: Not on file   • Years of education: Not on file   • Highest education level: Not on file   Occupational History   • Not on file   Social Needs   • Financial resource strain: Not on file   • Food insecurity:     Worry: Not on file     Inability: Not on file   • Transportation needs:     Medical: Not on file     Non-medical: Not on file   Tobacco Use   • Smoking status: Passive Smoke Exposure - Never Smoker   • Smokeless tobacco: Never Used   Substance and Sexual Activity   • Alcohol use: Yes     Frequency: Monthly or less     Drinks per session: 1 or 2     Binge frequency: Less than monthly     Comment: 3 drinks per month   • Drug use: No   • Sexual activity: Never      "Partners: Male   Lifestyle   • Physical activity:     Days per week: Not on file     Minutes per session: Not on file   • Stress: Not on file   Relationships   • Social connections:     Talks on phone: Not on file     Gets together: Not on file     Attends Orthodox service: Not on file     Active member of club or organization: Not on file     Attends meetings of clubs or organizations: Not on file     Relationship status: Not on file   • Intimate partner violence:     Fear of current or ex partner: Not on file     Emotionally abused: Not on file     Physically abused: Not on file     Forced sexual activity: Not on file   Other Topics Concern   • Not on file   Social History Narrative   • Not on file       Past Surgical History:   Procedure Laterality Date   • SHOULDER DECOMPRESSION ARTHROSCOPIC  12/7/2010    Performed by NEW RODRIGUEZ at [facility]   • CLAVICLE DISTAL EXCISION  12/7/2010    Performed by NEW RODRIGUEZ at [facility]   • SHOULDER ARTHROSCOPY W/ ROTATOR CUFF REPAIR  12/7/2010    Performed by NEW RODRIGUEZ at [facility] ORS   • HYSTERECTOMY, TOTAL ABDOMINAL  1985   • BREAST BIOPSY  1975    left   • CHOLECYSTECTOMY  1974   • US-NEEDLE CORE BX-BREAST PANEL         Allergies as of 01/17/2020 - Reviewed 01/17/2020   Allergen Reaction Noted   • Other misc Shortness of Breath 12/03/2010   • Morphine Anaphylaxis 07/21/2017   • Soap Rash 12/03/2010        ROS: Denies Chest pain, SOB, LE edema.    /68 (BP Location: Left arm, Patient Position: Sitting)   Pulse 64   Temp 36.4 °C (97.5 °F)   Ht 1.549 m (5' 1\")   Wt 56.2 kg (124 lb)   SpO2 94%   BMI 23.43 kg/m²     Physical Exam:  Gen:         Alert and oriented, No apparent distress.  Neck:        No Lymphadenopathy or Bruits.  Lungs:     Clear to auscultation bilaterally  CV:          Regular rate and rhythm. No murmurs, rubs or gallops.  Abd:         Soft non tender, non distended. Normal active " bowel sounds.  No  Hepatosplenomegaly, No pulsatile masses.                   Ext:          No clubbing, cyanosis, edema.      Assessment and Plan.   69 y.o. female with the following issues.    1. Medicare annual wellness visit, subsequent  Discussed healthy lifestyle habits as well as screening regimens.  Discussion about safe lifestyle practices, seatbelts, sunscreen, dietary recommendations.  - Subsequent Annual Wellness Visit - Includes PPPS ()    2. IGT (impaired glucose tolerance)  Discussed diet recheck 6 months  - POCT Hemoglobin A1C  - HEMOGLOBIN A1C; Future    3. Chronic right-sided low back pain without sciatica  Referral to pain specialist  - REFERRAL TO PAIN CLINIC    4. Esophageal dysphagia  She is compliant with PPI no breakthrough reflux referring to gastroenterology  - REFERRAL TO GASTROENTEROLOGY    5. Diarrhea, unspecified type  Discussed dietary modification again follow-up with gastroenterology    6. Dyslipidemia  Recheck cholesterol  - Comp Metabolic Panel; Future  - Lipid Profile; Future    7. Recurrent major depressive disorder, in partial remission (HCC)  Referring to psychology  - Patient has been identified as having a positive depression screening. Appropriate orders and counseling have been given.  - REFERRAL TO PSYCHOLOGY    8. HTN (hypertension), benign  Currently well controlled, Discuss diet, exercise and salt restriction.  No change to medication therapy.    9. Mixed incontinence  Continue current medications  - Subsequent Annual Wellness Visit - Includes PPPS ()    10. [place] chorea (HCC)  Stable no change to therapy  - Subsequent Annual Wellness Visit - Includes PPPS ()  - hydrOXYzine pamoate (VISTARIL) 50 MG Cap; Take 1 Cap by mouth 3 times a day as needed.  Dispense: 270 Cap; Refill: 3    11. Risk for falls  Precautions given  - Subsequent Annual Wellness Visit - Includes PPPS ()    12. Open-angle glaucoma, unspecified glaucoma stage, unspecified  laterality, unspecified open-angle glaucoma type  Follow with ophthalmology  - Subsequent Annual Wellness Visit - Includes PPPS ()    13. Mild intermittent asthma in adult without complication  Refilled albuterol  - Subsequent Annual Wellness Visit - Includes PPPS ()    14. Gastroesophageal reflux disease without esophagitis  Continue PPI  - Subsequent Annual Wellness Visit - Includes PPPS ()    15. Screen for colon cancer    - COLOGUARD COLON CANCER SCREENING  - Subsequent Annual Wellness Visit - Includes PPPS ()  - Patient identified as fall risk.  Appropriate orders and counseling given.        Referrals offered: Community-based lifestyle interventions to reduce health risks and promote self-management and wellness, fall prevention, nutrition, physical activity, tobacco-use cessation, weight loss, and mental health services as per orders if indicated.    Discussion today about general wellness and lifestyle habits:    · Prevent falls and reduce trip hazards; Cautioned about securing or removing rugs.  · Have a working fire alarm and carbon monoxide detector;   · Engage in regular physical activity and social activities

## 2020-01-17 NOTE — PROGRESS NOTES
Annual Health Assessment Questions:    1.  Are you currently engaging in any exercise or physical activity? Yes    2.  How would you describe your mood or emotional well-being today? good    3.  Have you had any falls in the last year? Yes    4.  Have you noticed any problems with your balance or had difficulty walking? Yes    5.  In the last six months have you experienced any leakage of urine? Yes    6. DPA/Advanced Directive: Patient does not have an Advanced Directive.  A packet and workshop information was given on Advanced Directives.

## 2020-03-18 ENCOUNTER — PATIENT OUTREACH (OUTPATIENT)
Dept: HEALTH INFORMATION MANAGEMENT | Facility: OTHER | Age: 70
End: 2020-03-18

## 2020-03-18 NOTE — PROGRESS NOTES
Member called in to schedule her [employer] in advanced for April 2nd at 2:15 for her eye doctor appointment. I advised that I set up the ride for her and she should be on the look out for a  about 10 minutes prior to the  time. She understood and had no other questions at this time. If the member calls back, please transfer to Rakel at [phone]

## 2020-05-04 RX ORDER — TRAZODONE HYDROCHLORIDE 50 MG/1
TABLET ORAL
Qty: 90 TAB | Refills: 1 | Status: SHIPPED | OUTPATIENT
Start: 2020-05-04 | End: 2021-07-27

## 2020-06-03 DIAGNOSIS — M25.50 ARTHRALGIA, UNSPECIFIED JOINT: ICD-10-CM

## 2020-06-03 RX ORDER — CELECOXIB 200 MG/1
CAPSULE ORAL
Qty: 90 CAP | Refills: 3 | Status: SHIPPED | OUTPATIENT
Start: 2020-06-03 | End: 2021-07-08 | Stop reason: SDUPTHER

## 2020-06-25 RX ORDER — HYDROXYZINE 50 MG/1
TABLET, FILM COATED ORAL
Qty: 90 TAB | Refills: 0 | Status: SHIPPED | OUTPATIENT
Start: 2020-06-25 | End: 2020-07-22

## 2020-07-17 ENCOUNTER — HOSPITAL ENCOUNTER (OUTPATIENT)
Dept: LAB | Facility: MEDICAL CENTER | Age: 70
End: 2020-07-17
Attending: INTERNAL MEDICINE
Payer: MEDICARE

## 2020-07-17 DIAGNOSIS — R73.02 IGT (IMPAIRED GLUCOSE TOLERANCE): ICD-10-CM

## 2020-07-17 DIAGNOSIS — E78.5 DYSLIPIDEMIA: Chronic | ICD-10-CM

## 2020-07-17 LAB
EST. AVERAGE GLUCOSE BLD GHB EST-MCNC: 123 MG/DL
HBA1C MFR BLD: 5.9 % (ref 0–5.6)

## 2020-07-17 PROCEDURE — 83036 HEMOGLOBIN GLYCOSYLATED A1C: CPT

## 2020-07-17 PROCEDURE — 36415 COLL VENOUS BLD VENIPUNCTURE: CPT

## 2020-07-17 PROCEDURE — 80053 COMPREHEN METABOLIC PANEL: CPT

## 2020-07-17 PROCEDURE — 80061 LIPID PANEL: CPT

## 2020-07-18 LAB
ALBUMIN SERPL BCP-MCNC: 4.5 G/DL (ref 3.2–4.9)
ALBUMIN/GLOB SERPL: 1.6 G/DL
ALP SERPL-CCNC: 48 U/L (ref 30–99)
ALT SERPL-CCNC: 23 U/L (ref 2–50)
ANION GAP SERPL CALC-SCNC: 13 MMOL/L (ref 7–16)
AST SERPL-CCNC: 27 U/L (ref 12–45)
BILIRUB SERPL-MCNC: 0.7 MG/DL (ref 0.1–1.5)
BUN SERPL-MCNC: 16 MG/DL (ref 8–22)
CALCIUM SERPL-MCNC: 9.3 MG/DL (ref 8.5–10.5)
CHLORIDE SERPL-SCNC: 102 MMOL/L (ref 96–112)
CHOLEST SERPL-MCNC: 146 MG/DL (ref 100–199)
CO2 SERPL-SCNC: 25 MMOL/L (ref 20–33)
CREAT SERPL-MCNC: 0.94 MG/DL (ref 0.5–1.4)
FASTING STATUS PATIENT QL REPORTED: NORMAL
GLOBULIN SER CALC-MCNC: 2.8 G/DL (ref 1.9–3.5)
GLUCOSE SERPL-MCNC: 94 MG/DL (ref 65–99)
HDLC SERPL-MCNC: 50 MG/DL
LDLC SERPL CALC-MCNC: 69 MG/DL
POTASSIUM SERPL-SCNC: 4.1 MMOL/L (ref 3.6–5.5)
PROT SERPL-MCNC: 7.3 G/DL (ref 6–8.2)
SODIUM SERPL-SCNC: 140 MMOL/L (ref 135–145)
TRIGL SERPL-MCNC: 133 MG/DL (ref 0–149)

## 2020-07-22 ENCOUNTER — OFFICE VISIT (OUTPATIENT)
Dept: MEDICAL GROUP | Facility: MEDICAL CENTER | Age: 70
End: 2020-07-22
Payer: MEDICARE

## 2020-07-22 VITALS
BODY MASS INDEX: 22.66 KG/M2 | HEIGHT: 61 IN | TEMPERATURE: 97.5 F | HEART RATE: 57 BPM | OXYGEN SATURATION: 93 % | DIASTOLIC BLOOD PRESSURE: 64 MMHG | SYSTOLIC BLOOD PRESSURE: 120 MMHG | WEIGHT: 120 LBS

## 2020-07-22 DIAGNOSIS — R29.898 WEAKNESS OF BOTH LOWER EXTREMITIES: ICD-10-CM

## 2020-07-22 DIAGNOSIS — E78.5 DYSLIPIDEMIA: Chronic | ICD-10-CM

## 2020-07-22 DIAGNOSIS — R73.02 IGT (IMPAIRED GLUCOSE TOLERANCE): ICD-10-CM

## 2020-07-22 PROCEDURE — 99214 OFFICE O/P EST MOD 30 MIN: CPT | Performed by: INTERNAL MEDICINE

## 2020-07-22 RX ORDER — HYDROXYZINE 50 MG/1
50 TABLET, FILM COATED ORAL 3 TIMES DAILY PRN
Qty: 270 TAB | Refills: 3 | Status: SHIPPED | OUTPATIENT
Start: 2020-07-22 | End: 2021-09-03 | Stop reason: SDUPTHER

## 2020-07-22 ASSESSMENT — FIBROSIS 4 INDEX: FIB4 SCORE: 1.76

## 2020-07-22 NOTE — PROGRESS NOTES
CC: Follow-up blood sugars, dyslipidemia, weakness in lower extremities.                                                                                                                                      HPI:   Janeen presents today with the following.    1. IGT (impaired glucose tolerance)  History of elevated blood sugars recently checked A1c found to be 5.8 stable.    2. Dyslipidemia  Maintain on statin without myalgia LDL well controlled.    3. 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  She denies any recent falls but has difficulty with movement.  She has been referred to neurology but never made it back to these appointments      Patient Active Problem List    Diagnosis Date Noted   • [place] chorea (HCC) 01/11/2010     Priority: High   • Risk for falls 12/20/2017   • Hot flashes 03/02/2017   • Mixed incontinence 03/02/2017   • Recurrent major depressive disorder, in partial remission ([facility]) 12/30/2016   • Asthma in adult 12/30/2016   • IGT (impaired glucose tolerance) 02/22/2013   • GERD (gastroesophageal reflux disease) 01/11/2010   • Dyslipidemia 01/11/2010   • HTN (hypertension), benign 01/11/2010   • Glaucoma 01/11/2010       Current Outpatient Medications   Medication Sig Dispense Refill   • hydrOXYzine HCl (ATARAX) 50 MG Tab Take 1 Tab by mouth 3 times a day as needed. 270 Tab 3   • celecoxib (CELEBREX) 200 MG Cap Take 1 capsule by mouth once daily 90 Cap 3   • traZODone (DESYREL) 50 MG Tab TAKE 1 TABLET BY MOUTH ONCE DAILY AT BEDTIME 90 Tab 1   • estradiol (ESTRACE) 0.5 MG tablet Take 1 Tab by mouth every day. 90 Tab 3   • fluticasone (FLONASE) 50 MCG/ACT nasal spray Spray 2 Sprays in nose every day. 1 Bottle 11   • albuterol 108 (90 Base) MCG/ACT Aero Soln inhalation aerosol Inhale 2 Puffs by mouth every 6 hours as needed. 8.5 g 11   •  "oxybutynin (DITROPAN) 5 MG Tab TAKE 1 TABLET BY MOUTH TWICE DAILY 180 Tab 3   • omeprazole (PRILOSEC) 40 MG delayed-release capsule TAKE 1 CAPSULE BY MOUTH ONCE DAILY 90 Cap 3   • atorvastatin (LIPITOR) 80 MG tablet TAKE ONE TABLET BY MOUTH ONCE DAILY IN THE EVENING 100 Tab 3   • baclofen (LIORESAL) 10 MG Tab TAKE 1 TABLET BY MOUTH THREE TIMES DAILY 90 Tab 6   • sertraline (ZOLOFT) 100 MG Tab Take 1 Tab by mouth every day. 90 Tab 3   • gabapentin (NEURONTIN) 300 MG Cap Take 2 Caps by mouth 3 times a day. (Patient taking differently: Take 600 mg by mouth 2 Times a Day.) 180 Cap 1   • Cholecalciferol (VITAMIN D3) 3000 UNITS Tab Take  by mouth.     • Multiple Vitamins-Minerals (HAIR/SKIN/NAILS PO) Take  by mouth.     • artificial tear ointment (REFRESH,LACRI-LUBE) Ointment Place 1 Application in both eyes every 8 hours.     • Calcium Carbonate-Vitamin D (CALTRATE 600+D PO) Take  by mouth every day.       No current facility-administered medications for this visit.          Allergies as of 07/22/2020 - Reviewed 07/22/2020   Allergen Reaction Noted   • Other misc Shortness of Breath 12/03/2010   • Morphine Anaphylaxis 07/21/2017   • Soap Rash 12/03/2010        ROS: Denies Chest pain, SOB, LE edema.    /64 (BP Location: Left arm, Patient Position: Sitting)   Pulse (!) 57   Temp 36.4 °C (97.5 °F)   Ht 1.549 m (5' 1\")   Wt 54.4 kg (120 lb)   SpO2 93%   BMI 22.67 kg/m²     Physical Exam:  Gen:         Alert and oriented, No apparent distress.  Neck:        No Lymphadenopathy or Bruits.  Lungs:     Clear to auscultation bilaterally  CV:          Regular rate and rhythm. No murmurs, rubs or gallops.               Ext:          No clubbing, cyanosis, edema.      Assessment and Plan.   70 y.o. female with the following issues.    1. IGT (impaired glucose tolerance)  Discussed diet exercise continue current medications.    2. Dyslipidemia  Lipids currently well controlled. Discussed continued diet and exercise recheck " 6 months to 1 year.    3. Weakness of both lower extremities  Referred back to neurology.  - REFERRAL TO NEUROLOGY

## 2020-08-17 DIAGNOSIS — K21.9 GASTROESOPHAGEAL REFLUX DISEASE WITHOUT ESOPHAGITIS: Chronic | ICD-10-CM

## 2020-08-17 RX ORDER — OMEPRAZOLE 40 MG/1
CAPSULE, DELAYED RELEASE ORAL
Qty: 90 CAP | Refills: 1 | Status: SHIPPED | OUTPATIENT
Start: 2020-08-17 | End: 2021-02-22 | Stop reason: SDUPTHER

## 2020-08-28 DIAGNOSIS — G89.29 CHRONIC BILATERAL LOW BACK PAIN WITH LEFT-SIDED SCIATICA: ICD-10-CM

## 2020-08-28 DIAGNOSIS — M54.42 CHRONIC BILATERAL LOW BACK PAIN WITH LEFT-SIDED SCIATICA: ICD-10-CM

## 2020-08-28 RX ORDER — BACLOFEN 10 MG/1
TABLET ORAL
Qty: 100 TAB | Refills: 0 | Status: SHIPPED | OUTPATIENT
Start: 2020-08-28 | End: 2021-07-27

## 2020-10-04 DIAGNOSIS — E78.5 DYSLIPIDEMIA: Chronic | ICD-10-CM

## 2020-10-06 RX ORDER — ATORVASTATIN CALCIUM 80 MG/1
TABLET, FILM COATED ORAL
Qty: 100 TAB | Refills: 1 | Status: SHIPPED | OUTPATIENT
Start: 2020-10-06 | End: 2021-01-26 | Stop reason: SDUPTHER

## 2020-10-08 ENCOUNTER — NON-PROVIDER VISIT (OUTPATIENT)
Dept: MEDICAL GROUP | Facility: MEDICAL CENTER | Age: 70
End: 2020-10-08
Payer: MEDICARE

## 2020-10-08 ENCOUNTER — PATIENT OUTREACH (OUTPATIENT)
Dept: HEALTH INFORMATION MANAGEMENT | Facility: OTHER | Age: 70
End: 2020-10-08

## 2020-10-08 DIAGNOSIS — Z23 NEED FOR VACCINATION: ICD-10-CM

## 2020-10-08 PROCEDURE — G0008 ADMIN INFLUENZA VIRUS VAC: HCPCS | Performed by: INTERNAL MEDICINE

## 2020-10-08 PROCEDURE — 90662 IIV NO PRSV INCREASED AG IM: CPT | Performed by: INTERNAL MEDICINE
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
## 2020-10-08 NOTE — PROGRESS NOTES
"Ashley Ritter is a 70 y.o. female here for a non-provider visit for:   FLU    Reason for immunization: Annual Flu Vaccine  Immunization records indicate need for vaccine: Yes, confirmed with Epic  Minimum interval has been met for this vaccine: Yes  ABN completed: Not Indicated    Order and dose verified by: HAO DALLAS Dated  08/15/19 was given to patient: Yes  All IAC Questionnaire questions were answered \"No.\"    Patient tolerated injection and no adverse effects were observed or reported: Yes    Pt scheduled for next dose in series: Not Indicated    "
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
## 2020-11-05 ENCOUNTER — OFFICE VISIT (OUTPATIENT)
Dept: MEDICAL GROUP | Facility: MEDICAL CENTER | Age: 70
End: 2020-11-05
Payer: MEDICARE

## 2020-11-05 VITALS
SYSTOLIC BLOOD PRESSURE: 132 MMHG | TEMPERATURE: 98.6 F | WEIGHT: 122 LBS | DIASTOLIC BLOOD PRESSURE: 50 MMHG | BODY MASS INDEX: 23.03 KG/M2 | RESPIRATION RATE: 16 BRPM | HEIGHT: 61 IN | OXYGEN SATURATION: 94 % | HEART RATE: 68 BPM

## 2020-11-05 DIAGNOSIS — E78.5 DYSLIPIDEMIA: Chronic | ICD-10-CM

## 2020-11-05 DIAGNOSIS — G10 HUNTINGTON CHOREA (HCC): ICD-10-CM

## 2020-11-05 DIAGNOSIS — I10 HTN (HYPERTENSION), BENIGN: Chronic | ICD-10-CM

## 2020-11-05 DIAGNOSIS — F33.41 RECURRENT MAJOR DEPRESSIVE DISORDER, IN PARTIAL REMISSION (HCC): ICD-10-CM

## 2020-11-05 DIAGNOSIS — R26.81 GAIT INSTABILITY: ICD-10-CM

## 2020-11-05 DIAGNOSIS — K21.9 GASTROESOPHAGEAL REFLUX DISEASE WITHOUT ESOPHAGITIS: ICD-10-CM

## 2020-11-05 DIAGNOSIS — N95.9 POSTMENOPAUSAL SYMPTOMS: ICD-10-CM

## 2020-11-05 DIAGNOSIS — F51.04 CHRONIC INSOMNIA: ICD-10-CM

## 2020-11-05 PROCEDURE — 99204 OFFICE O/P NEW MOD 45 MIN: CPT | Performed by: FAMILY MEDICINE

## 2020-11-05 RX ORDER — MULTIVIT WITH MINERALS/LUTEIN
TABLET ORAL
COMMUNITY
End: 2021-01-26

## 2020-11-05 RX ORDER — ACETAMINOPHEN 500 MG
500-1000 TABLET ORAL EVERY 6 HOURS PRN
COMMUNITY
End: 2021-01-26

## 2020-11-05 RX ORDER — SUMATRIPTAN 100 MG/1
100 TABLET, FILM COATED ORAL
COMMUNITY
End: 2021-01-26

## 2020-11-05 RX ORDER — LOPERAMIDE HYDROCHLORIDE 2 MG/1
2 CAPSULE ORAL 4 TIMES DAILY PRN
COMMUNITY
End: 2021-01-26

## 2020-11-05 RX ORDER — PSEUDOEPHEDRINE HCL 30 MG
TABLET ORAL
COMMUNITY
End: 2021-01-26

## 2020-11-05 RX ORDER — KRILL/OM-3/DHA/EPA/PHOSPHO/AST 500-110 MG
500 CAPSULE ORAL EVERY EVENING
COMMUNITY

## 2020-11-05 RX ORDER — CYCLOSPORINE 0.5 MG/ML
1 EMULSION OPHTHALMIC 2 TIMES DAILY
COMMUNITY
End: 2021-01-26

## 2020-11-05 ASSESSMENT — FIBROSIS 4 INDEX: FIB4 SCORE: 1.76

## 2020-11-05 NOTE — PROGRESS NOTES
CC: Depression postmenopausal symptoms, hypertension, hyperlipidemia, chronic insomnia, gait instability/Sheridan's Milli    HPI:  Janeen presents today to establish new PCP.    Patient has been active and independent for ADLs.  Has the following chronic medical issues    Recurrent major depressive disorder, in partial remission (HCC)  Her mood has been fluctuating but mostly stable.  She is currently on Zoloft 100 mg daily, no side effects.    Postmenopausal symptoms  Patient with history of hysterectomy.  She has been on estradiol tablets for many years, she wants to try to taper down until she stops it    Gastroesophageal reflux disease without esophagitis  Denies epigastric pain, heartburn, nausea, vomiting.  She has been doing fine up omeprazole 40 mg daily.  No side effects    HTN (hypertension), benign  Patient is currently not on medication, her blood pressure has been controlled with low-salt diet    Dyslipidemia  She has been tolerating the statin. Denies muscle pain LFTs has been normal, has been on atorvastatin 80 mg    Chronic insomnia  Patient stated that trazodone 50 mg has been helping her sleep.    Gait instability/  Sheridan chorea (HCC)  Patient reported some leg instability, and shaky hands once in a while.  However it has not been affecting her quality of life.  As per patient, she carries a diagnosis of Sheridan's since 2006, which is unlikely(by this time patient should have either passed or has a very bad complications).  However she has a family history of the disease, her father had it.  Which is compatible with the diagnosis.Patient was referred before to neurology to confirm the diagnosis but she did not follow-up.      He had the flu shot already    Patient Active Problem List    Diagnosis Date Noted   • [place] chorea (HCC) 01/11/2010     Priority: High   • Risk for falls 12/20/2017   • Hot flashes 03/02/2017   • Mixed incontinence 03/02/2017   • Recurrent major depressive  disorder, in partial remission (HCC) 12/30/2016   • Asthma in adult 12/30/2016   • IGT (impaired glucose tolerance) 02/22/2013   • GERD (gastroesophageal reflux disease) 01/11/2010   • Dyslipidemia 01/11/2010   • HTN (hypertension), benign 01/11/2010   • Glaucoma 01/11/2010       Current Outpatient Medications   Medication Sig Dispense Refill   • Biotin 5000 MCG Cap Take  by mouth.     • Krill Oil (OMEGA-3) 500 MG Cap Take  by mouth.     • diphenhydrAMINE HCl (BENADRYL ALLERGY PO) Take  by mouth.     • Flaxseed, Linseed, (FLAXSEED OIL PO) Take  by mouth.     • Ascorbic Acid (VITAMIN C) 1000 MG Tab Take  by mouth.     • cyclosporin (RESTASIS) 0.05 % ophthalmic emulsion Place 1 Drop in both eyes 2 times a day.     • Calcium-Vitamins C & D (CALCIUM/C/D) 500- MG-MG-UNIT Chew Tab Take  by mouth.     • acetaminophen (TYLENOL) 500 MG Tab Take 500-1,000 mg by mouth every 6 hours as needed.     • sumatriptan (IMITREX) 100 MG tablet Take 100 mg by mouth Once PRN for Migraine.     • loperamide (IMODIUM) 2 MG Cap Take 2 mg by mouth 4 times a day as needed for Diarrhea.     • atorvastatin (LIPITOR) 80 MG tablet TAKE 1 TABLET BY MOUTH ONCE DAILY IN THE EVENING 100 Tab 1   • baclofen (LIORESAL) 10 MG Tab TAKE 1 TABLET BY MOUTH THREE TIMES DAILY 100 Tab 0   • omeprazole (PRILOSEC) 40 MG delayed-release capsule Take 1 capsule by mouth once daily 90 Cap 1   • hydrOXYzine HCl (ATARAX) 50 MG Tab Take 1 Tab by mouth 3 times a day as needed. 270 Tab 3   • celecoxib (CELEBREX) 200 MG Cap Take 1 capsule by mouth once daily 90 Cap 3   • traZODone (DESYREL) 50 MG Tab TAKE 1 TABLET BY MOUTH ONCE DAILY AT BEDTIME 90 Tab 1   • estradiol (ESTRACE) 0.5 MG tablet Take 1 Tab by mouth every day. 90 Tab 3   • fluticasone (FLONASE) 50 MCG/ACT nasal spray Spray 2 Sprays in nose every day. 1 Bottle 11   • albuterol 108 (90 Base) MCG/ACT Aero Soln inhalation aerosol Inhale 2 Puffs by mouth every 6 hours as needed. 8.5 g 11   • oxybutynin (DITROPAN)  5 MG Tab TAKE 1 TABLET BY MOUTH TWICE DAILY 180 Tab 3   • sertraline (ZOLOFT) 100 MG Tab Take 1 Tab by mouth every day. 90 Tab 3   • gabapentin (NEURONTIN) 300 MG Cap Take 2 Caps by mouth 3 times a day. (Patient taking differently: Take 600 mg by mouth 2 Times a Day.) 180 Cap 1   • Cholecalciferol (VITAMIN D3) 3000 UNITS Tab Take  by mouth.     • Multiple Vitamins-Minerals (HAIR/SKIN/NAILS PO) Take  by mouth.     • artificial tear ointment (REFRESH,LACRI-LUBE) Ointment Place 1 Application in both eyes every 8 hours.     • Calcium Carbonate-Vitamin D (CALTRATE 600+D PO) Take  by mouth every day.       No current facility-administered medications for this visit.          Allergies as of 11/05/2020 - Reviewed 11/05/2020   Allergen Reaction Noted   • Other misc Shortness of Breath 12/03/2010   • Morphine Anaphylaxis 07/21/2017   • Soap Rash 12/03/2010        Social History     Socioeconomic History   • Marital status:      Spouse name: Not on file   • Number of children: Not on file   • Years of education: Not on file   • Highest education level: Not on file   Occupational History   • Not on file   Social Needs   • Financial resource strain: Not on file   • Food insecurity     Worry: Not on file     Inability: Not on file   • Transportation needs     Medical: Not on file     Non-medical: Not on file   Tobacco Use   • Smoking status: Passive Smoke Exposure - Never Smoker   • Smokeless tobacco: Never Used   Substance and Sexual Activity   • Alcohol use: Yes     Frequency: Monthly or less     Drinks per session: 1 or 2     Binge frequency: Less than monthly     Comment: 3 drinks per month   • Drug use: No   • Sexual activity: Never     Partners: Male   Lifestyle   • Physical activity     Days per week: Not on file     Minutes per session: Not on file   • Stress: Not on file   Relationships   • Social connections     Talks on phone: Not on file     Gets together: Not on file     Attends Hinduism service: Not on file      Active member of club or organization: Not on file     Attends meetings of clubs or organizations: Not on file     Relationship status: Not on file   • Intimate partner violence     Fear of current or ex partner: Not on file     Emotionally abused: Not on file     Physically abused: Not on file     Forced sexual activity: Not on file   Other Topics Concern   • Not on file   Social History Narrative   • Not on file       Family History   Problem Relation Age of Onset   • Heart Disease Mother    • Hypertension Mother    • Other Mother         GERD   • Arthritis Mother    • Hyperlipidemia Mother    • Hypertension Father    • Heart Disease Father    • Hyperlipidemia Father    • Lung Disease Father         Chronic bronchitis/non-smoker   • Heart Attack Father    • Stroke Father    • Heart Disease Sister    • Hyperlipidemia Sister    • Hypertension Sister    • Arthritis Sister    • Dementia Sister    • Psychiatric Illness Brother    • Lung Disease Brother         Agent orange   • Cancer Sister         lymph node, rids and GI   • Allergies Sister    • Arthritis Sister         RA   • Heart Disease Sister    • Stroke Sister    • Other Sister         hypoglycemia/ulcers/hole in eye       Past Surgical History:   Procedure Laterality Date   • SHOULDER DECOMPRESSION ARTHROSCOPIC  12/7/2010    Performed by NEW R[facility]IGUE[facility] at [facility] ORS   • CLAVICLE DISTAL EXCISION  12/7/2010    Performed by NEW RODRIGUE[facility] at [facility] ORS   • SHOULDER ARTHROSCOPY W/ ROTATOR CUFF REPAIR  12/7/2010    Performed by NEW RODRIGUE[facility] at [facility] ORS   • HYSTERECTOMY, TOTAL ABDOMINAL  1985   • BREAST BIOPSY  1975    left   • CHOLECYSTECTOMY  1974   • US-NEEDLE CORE BX-BREAST PANEL         ROS:  Denies any Headache, Blurred Vision, Confusion Chest pain,  Shortness of breath,  Abdominal pain, Changes of bowel or bladder, Lower ext edema, Fevers, Nights sweats, Weight Changes, Focal weakness or numbness.   "All other systems are negative.    /50 (BP Location: Right arm, Patient Position: Sitting, BP Cuff Size: Adult)   Pulse 68   Temp 37 °C (98.6 °F) (Temporal)   Resp 16   Ht 1.549 m (5' 1\")   Wt 55.3 kg (122 lb)   SpO2 94%   BMI 23.05 kg/m²     Physical Exam:  Gen:         Alert and oriented, No apparent distress.  HEENT:   P[place]la, TM clear,  Oralpharynx no erythema or exudates.  Neck:       No Jugular venous distension, Lymphadenopathy, Thyromegaly, Bruits.  Lungs:     Clear to auscultation bilaterally  CV:          Regular rate and rhythm. No murmurs, rubs or gallops.  Abd:         Soft non tender, non distended. Normal active bowel sounds. No                                        Hepatosplenomegaly, No pulsatile masses.  Ext:          No clubbing, cyanosis, edema.      Assessment and Plan.   70 y.o. female     1. Recurrent major depressive disorder, in partial remission (HCC)  Stable.  Continue on Zoloft 100 mg daily    2. Postmenopausal symptoms  Has been on estradiol tablets for many years, she wants to try to taper down until she stops it    3. Gastroesophageal reflux disease without esophagitis  She has been doing fine up omeprazole 40 mg daily    4. HTN (hypertension), benign  Currently not on medication, has been controlled with low-salt diet    5. Dyslipidemia  He has been tolerating the statin. Denies muscle pain LFTs has been normal  Continue on atorvastatin 80 mg    6. Chronic insomnia  Trazodone 50 mg has been    7. Gait instability/  [place] chorea (HCC)  Patient carries a diagnosis of Vale's since 2006, which is unlikely(by this time patient should have either passed or has a very bad complications).  However she has a family history of the disease, her father had it.  Patient was referred before to neurology to confirm the diagnosis but she did not follow-up.  Patient advised to follow-up with neurology    - REFERRAL TO NEUROLOGY  "

## 2020-11-19 NOTE — PROGRESS NOTES
Transferred from [facility] in  Requested Plan Materials I submitted the requested  through PSN advised the Mbr it will take 7-10 days to receive. (In Large Print) Mbr requested we do a 3way phone call with [phone] they are out of network with [facility] and she would like a reimbursement from them she paid $480 out of pocket we did the call with Paula and they are willing to refund or do a exchange as long as its within 60days. Got approval from [employer] for a 1 time [employer] drop off at [address] [facility] and  nothing else needed at this time.  Systems Used: [employer] [facility]

## 2021-01-26 ENCOUNTER — OFFICE VISIT (OUTPATIENT)
Dept: MEDICAL GROUP | Facility: MEDICAL CENTER | Age: 71
End: 2021-01-26
Payer: MEDICARE

## 2021-01-26 VITALS
DIASTOLIC BLOOD PRESSURE: 70 MMHG | TEMPERATURE: 98.6 F | RESPIRATION RATE: 16 BRPM | HEIGHT: 61 IN | HEART RATE: 65 BPM | WEIGHT: 130 LBS | OXYGEN SATURATION: 95 % | SYSTOLIC BLOOD PRESSURE: 130 MMHG | BODY MASS INDEX: 24.55 KG/M2

## 2021-01-26 DIAGNOSIS — N18.31 STAGE 3A CHRONIC KIDNEY DISEASE: ICD-10-CM

## 2021-01-26 DIAGNOSIS — G10 HUNTINGTON CHOREA (HCC): ICD-10-CM

## 2021-01-26 DIAGNOSIS — J45.20 MILD INTERMITTENT ASTHMA IN ADULT WITHOUT COMPLICATION: ICD-10-CM

## 2021-01-26 DIAGNOSIS — F33.41 RECURRENT MAJOR DEPRESSIVE DISORDER, IN PARTIAL REMISSION (HCC): ICD-10-CM

## 2021-01-26 DIAGNOSIS — E78.5 DYSLIPIDEMIA: Chronic | ICD-10-CM

## 2021-01-26 PROCEDURE — 99214 OFFICE O/P EST MOD 30 MIN: CPT | Performed by: FAMILY MEDICINE

## 2021-01-26 RX ORDER — ATORVASTATIN CALCIUM 80 MG/1
TABLET, FILM COATED ORAL
Qty: 100 TAB | Refills: 1 | Status: SHIPPED | OUTPATIENT
Start: 2021-01-26 | End: 2021-09-03 | Stop reason: SDUPTHER

## 2021-01-26 ASSESSMENT — FIBROSIS 4 INDEX: FIB4 SCORE: 1.76

## 2021-01-26 ASSESSMENT — PATIENT HEALTH QUESTIONNAIRE - PHQ9
2. FEELING DOWN, DEPRESSED, IRRITABLE, OR HOPELESS: NOT AT ALL
8. MOVING OR SPEAKING SO SLOWLY THAT OTHER PEOPLE COULD HAVE NOTICED. OR THE OPPOSITE, BEING SO FIGETY OR RESTLESS THAT YOU HAVE BEEN MOVING AROUND A LOT MORE THAN USUAL: NOT AT ALL
SUM OF ALL RESPONSES TO PHQ QUESTIONS 1-9: 0
7. TROUBLE CONCENTRATING ON THINGS, SUCH AS READING THE NEWSPAPER OR WATCHING TELEVISION: NOT AT ALL
1. LITTLE INTEREST OR PLEASURE IN DOING THINGS: NOT AT ALL
6. FEELING BAD ABOUT YOURSELF - OR THAT YOU ARE A FAILURE OR HAVE LET YOURSELF OR YOUR FAMILY DOWN: NOT AL ALL
9. THOUGHTS THAT YOU WOULD BE BETTER OFF DEAD, OR OF HURTING YOURSELF: NOT AT ALL
3. TROUBLE FALLING OR STAYING ASLEEP OR SLEEPING TOO MUCH: NOT AT ALL
4. FEELING TIRED OR HAVING LITTLE ENERGY: NOT AT ALL
5. POOR APPETITE OR OVEREATING: NOT AT ALL
SUM OF ALL RESPONSES TO PHQ9 QUESTIONS 1 AND 2: 0

## 2021-01-27 NOTE — PROGRESS NOTES
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  Discussed with patient that I will send her for blood test to confirm the diagnosis first .    Recurrent major depressive disorder, in partial remission (HCC)  Mood has been fluctuating, however she has been doing fine on sertraline 100 mg daily for her mood and hydroxyzine for anxiety and itching.    Dyslipidemia  He has been tolerating the statin. Denies muscle pain LFTs has been normal, she has been on atorvastatin 80 mg daily, she needs medication refill    Mild intermittent asthma in adult without complication  Has been mostly asymptomatic. She takes albuterol as needed( rarely needs it).    Chronic kidney disease stage III  Last GFR was 59, normal creatinine, patient has been asymptomatic      Patient Active Problem List    Diagnosis Date Noted   • Vale chorea (HCC) 01/11/2010     Priority: High   • Risk for falls 12/20/2017   • Hot flashes 03/02/2017   • Mixed incontinence 03/02/2017   • Recurrent major depressive disorder, in partial remission (HCC) 12/30/2016   • Asthma in adult 12/30/2016   • IGT (impaired glucose tolerance) 02/22/2013   • GERD (gastroesophageal reflux disease) 01/11/2010   • Dyslipidemia 01/11/2010   • HTN (hypertension), benign 01/11/2010   • Glaucoma 01/11/2010       Current Outpatient Medications   Medication Sig Dispense Refill   • Biotin 5000 MCG Cap Take  by mouth.     • Krill Oil  "(OMEGA-3) 500 MG Cap Take  by mouth.     • Flaxseed, Linseed, (FLAXSEED OIL PO) Take  by mouth.     • atorvastatin (LIPITOR) 80 MG tablet TAKE 1 TABLET BY MOUTH ONCE DAILY IN THE EVENING 100 Tab 1   • baclofen (LIORESAL) 10 MG Tab TAKE 1 TABLET BY MOUTH THREE TIMES DAILY 100 Tab 0   • omeprazole (PRILOSEC) 40 MG delayed-release capsule Take 1 capsule by mouth once daily 90 Cap 1   • hydrOXYzine HCl (ATARAX) 50 MG Tab Take 1 Tab by mouth 3 times a day as needed. 270 Tab 3   • celecoxib (CELEBREX) 200 MG Cap Take 1 capsule by mouth once daily 90 Cap 3   • traZODone (DESYREL) 50 MG Tab TAKE 1 TABLET BY MOUTH ONCE DAILY AT BEDTIME 90 Tab 1   • estradiol (ESTRACE) 0.5 MG tablet Take 1 Tab by mouth every day. 90 Tab 3   • albuterol 108 (90 Base) MCG/ACT Aero Soln inhalation aerosol Inhale 2 Puffs by mouth every 6 hours as needed. 8.5 g 11   • oxybutynin (DITROPAN) 5 MG Tab TAKE 1 TABLET BY MOUTH TWICE DAILY 180 Tab 3   • sertraline (ZOLOFT) 100 MG Tab Take 1 Tab by mouth every day. 90 Tab 3   • gabapentin (NEURONTIN) 300 MG Cap Take 2 Caps by mouth 3 times a day. (Patient taking differently: Take 600 mg by mouth 2 Times a Day.) 180 Cap 1     No current facility-administered medications for this visit.          Allergies as of 01/26/2021 - Reviewed 01/26/2021   Allergen Reaction Noted   • Other misc Shortness of Breath 12/03/2010   • Morphine Anaphylaxis 07/21/2017   • Soap Rash 12/03/2010        ROS: Denies any chest pain, Shortness of breath, Changes bowel or bladder, Lower extremity edema.    Physical Exam:  /70 (BP Location: Right arm, Patient Position: Sitting, BP Cuff Size: Adult)   Pulse 65   Temp 37 °C (98.6 °F) (Temporal)   Resp 16   Ht 1.549 m (5' 1\")   Wt 59 kg (130 lb)   SpO2 95%   BMI 24.56 kg/m²   Gen.: Well-developed, well-nourished, no apparent distress,pleasant and cooperative with the examination  Skin:  Warm and dry with good turgor. No rashes or suspicious lesions in visible " areas  HEENT:Sinuses nontender with palpation, TMs clear, nares patent with pink mucosa and clear rhinorrhea,no septal deviation ,polyps or lesions. lips without lesions, oropharynx clear.  Neck: Trachea midline,no masses or adenopathy. No JVD.  Cor: Regular rate and rhythm without murmur, gallop or rub.  Lungs: Respirations unlabored.Clear to auscultation with equal breath sounds bilaterally. No wheezes, rhonchi.  Extremities: No cyanosis, clubbing or edema.      Assessment and Plan.   70 y.o. female     1. [place] chorea (HCC)  She was told since 2006 that she has [place] chorea(clinically no obvious deterioration which makes [place]ingo's chorea since 2006 is unlikely), however patient has been having leg instability, she fell 2 days ago currently having some muscle pain that has been getting better, Tylenol has been helping, denies any swelling or tenderness anywhere.  Patient was referred to neurology but she does not want to go to neurology because she cannot afford it.  We will send patient for blood test to confirm the diagnosis.    - MINGO DISEASE MUTATION    2. Recurrent major depressive disorder, in partial remission (HCC)  Stable.  Continue sertraline 100 mg daily.  Advised to take hydroxyzine only as needed    3. Dyslipidemia  He has been tolerating the statin. Denies muscle pain LFTs has been normal  Continue atorvastatin 80 mg daily, needs medication refill    4. Mild intermittent asthma in adult without complication  Stable.  Continue albuterol as needed.    5. Chronic kidney disease stage III  Last GFR was 59, normal creatinine,  Recommend hydration and avoiding nephrotoxic medication.    Continue monitor kidney function

## 2021-01-27 NOTE — PROGRESS NOTES
Annual Health Assessment Questions:    1.  Are you currently engaging in any exercise or physical activity? Yes    2.  How would you describe your mood or emotional well-being today? good    3.  Have you had any falls in the last year? Yes    4.  Have you noticed any problems with your balance or had difficulty walking? Yes    5.  In the last six months have you experienced any leakage of urine? Yes    6. DPA/Advanced Directive: Patient does not have an Advanced Directive.  A packet and workshop information was given on Advanced Directives.       CC: [place] chorea, depression, dyslipidemia, asthma    HPI:   Janeen presents today to discuss the following:    Vale chorea (HCC)  She was told since 2006 that she has [place] chorea(clinically no obvious deterioration which makes Vale's chorea since 2006 is unlikely), however patient has been having leg instability, she fell 2 days ago currently having some muscle pain that has been getting better, Tylenol has been helping, denies any swelling or tenderness anywhere.Patient was referred to neurology but she does not want to go to neurology because she cannot afford it.  Discussed with patient that I will send her for blood test to confirm the diagnosis first .    Recurrent major depressive disorder, in partial remission (HCC)  Mood has been fluctuating, however she has been doing fine on sertraline 100 mg daily for her mood and hydroxyzine for anxiety and itching.    Dyslipidemia  He has been tolerating the statin. Denies muscle pain LFTs has been normal, she has been on atorvastatin 80 mg daily, she needs medication refill    Mild intermittent asthma in adult without complication  Has been mostly asymptomatic. She takes albuterol as needed( rarely needs it).    Chronic kidney disease stage III  Last GFR was 59, normal creatinine, patient has been asymptomatic      Patient Active Problem List    Diagnosis Date Noted   • [place] chorea (HCC)  01/11/2010     Priority: High   • Risk for falls 12/20/2017   • Hot flashes 03/02/2017   • Mixed incontinence 03/02/2017   • Recurrent major depressive disorder, in partial remission (HCC) 12/30/2016   • Asthma in adult 12/30/2016   • IGT (impaired glucose tolerance) 02/22/2013   • GERD (gastroesophageal reflux disease) 01/11/2010   • Dyslipidemia 01/11/2010   • HTN (hypertension), benign 01/11/2010   • Glaucoma 01/11/2010       Current Outpatient Medications   Medication Sig Dispense Refill   • Biotin 5000 MCG Cap Take  by mouth.     • Krill Oil (OMEGA-3) 500 MG Cap Take  by mouth.     • Flaxseed, Linseed, (FLAXSEED OIL PO) Take  by mouth.     • atorvastatin (LIPITOR) 80 MG tablet TAKE 1 TABLET BY MOUTH ONCE DAILY IN THE EVENING 100 Tab 1   • baclofen (LIORESAL) 10 MG Tab TAKE 1 TABLET BY MOUTH THREE TIMES DAILY 100 Tab 0   • omeprazole (PRILOSEC) 40 MG delayed-release capsule Take 1 capsule by mouth once daily 90 Cap 1   • hydrOXYzine HCl (ATARAX) 50 MG Tab Take 1 Tab by mouth 3 times a day as needed. 270 Tab 3   • celecoxib (CELEBREX) 200 MG Cap Take 1 capsule by mouth once daily 90 Cap 3   • traZODone (DESYREL) 50 MG Tab TAKE 1 TABLET BY MOUTH ONCE DAILY AT BEDTIME 90 Tab 1   • estradiol (ESTRACE) 0.5 MG tablet Take 1 Tab by mouth every day. 90 Tab 3   • albuterol 108 (90 Base) MCG/ACT Aero Soln inhalation aerosol Inhale 2 Puffs by mouth every 6 hours as needed. 8.5 g 11   • oxybutynin (DITROPAN) 5 MG Tab TAKE 1 TABLET BY MOUTH TWICE DAILY 180 Tab 3   • sertraline (ZOLOFT) 100 MG Tab Take 1 Tab by mouth every day. 90 Tab 3   • gabapentin (NEURONTIN) 300 MG Cap Take 2 Caps by mouth 3 times a day. (Patient taking differently: Take 600 mg by mouth 2 Times a Day.) 180 Cap 1     No current facility-administered medications for this visit.          Allergies as of 01/26/2021 - Reviewed 01/26/2021   Allergen Reaction Noted   • Other misc Shortness of Breath 12/03/2010   • Morphine Anaphylaxis 07/21/2017   • Soap Rash  "12/03/2010        ROS: Denies any chest pain, Shortness of breath, Changes bowel or bladder, Lower extremity edema.    Physical Exam:  /70 (BP Location: Right arm, Patient Position: Sitting, BP Cuff Size: Adult)   Pulse 65   Temp 37 °C (98.6 °F) (Temporal)   Resp 16   Ht 1.549 m (5' 1\")   Wt 59 kg (130 lb)   SpO2 95%   BMI 24.56 kg/m²   Gen.: Well-developed, well-nourished, no apparent distress,pleasant and cooperative with the examination  Skin:  Warm and dry with good turgor. No rashes or suspicious lesions in visible areas  HEENT:Sinuses nontender with palpation, TMs clear, nares patent with pink mucosa and clear rhinorrhea,no septal deviation ,polyps or lesions. lips without lesions, oropharynx clear.  Neck: Trachea midline,no masses or adenopathy. No JVD.  Cor: Regular rate and rhythm without murmur, gallop or rub.  Lungs: Respirations unlabored.Clear to auscultation with equal breath sounds bilaterally. No wheezes, rhonchi.  Extremities: No cyanosis, clubbing or edema.      Assessment and Plan.   70 y.o. female     1. [place] chorea (HCC)  She was told since 2006 that she has [place] chorea(clinically no obvious deterioration which makes [place]'s chorea since 2006 is unlikely), however patient has been having leg instability, she fell 2 days ago currently having some muscle pain that has been getting better, Tylenol has been helping, denies any swelling or tenderness anywhere.  Patient was referred to neurology but she does not want to go to neurology because she cannot afford it.  We will send patient for blood test to confirm the diagnosis.    - MINGO DISEASE MUTATION    2. Recurrent major depressive disorder, in partial remission (HCC)  Stable.  Continue sertraline 100 mg daily.  Advised to take hydroxyzine only as needed    3. Dyslipidemia  He has been tolerating the statin. Denies muscle pain LFTs has been normal  Continue atorvastatin 80 mg daily, needs medication refill    4. " Mild intermittent asthma in adult without complication  Stable.  Continue albuterol as needed.    5. Chronic kidney disease stage III  Last GFR was 59, normal creatinine,  Recommend hydration and avoiding nephrotoxic medication.    Continue monitor kidney function

## 2021-02-16 RX ORDER — OXYBUTYNIN CHLORIDE 5 MG/1
TABLET ORAL
Qty: 180 TABLET | Refills: 3 | Status: SHIPPED | OUTPATIENT
Start: 2021-02-16 | End: 2022-05-12

## 2021-02-22 ENCOUNTER — HOSPITAL ENCOUNTER (OUTPATIENT)
Dept: LAB | Facility: MEDICAL CENTER | Age: 71
End: 2021-02-22
Attending: FAMILY MEDICINE
Payer: MEDICARE

## 2021-02-22 DIAGNOSIS — K21.9 GASTROESOPHAGEAL REFLUX DISEASE WITHOUT ESOPHAGITIS: Chronic | ICD-10-CM

## 2021-02-22 PROCEDURE — 36415 COLL VENOUS BLD VENIPUNCTURE: CPT

## 2021-02-22 PROCEDURE — 81271 HTT GENE DETC ABNOR ALLELES: CPT

## 2021-02-22 PROCEDURE — 369999 HCHG MISC LAB CHARGE

## 2021-02-22 RX ORDER — OMEPRAZOLE 40 MG/1
CAPSULE, DELAYED RELEASE ORAL
Qty: 90 CAPSULE | Refills: 3 | Status: SHIPPED | OUTPATIENT
Start: 2021-02-22 | End: 2022-02-16

## 2021-02-22 RX ORDER — OMEPRAZOLE 40 MG/1
CAPSULE, DELAYED RELEASE ORAL
Qty: 90 CAPSULE | Refills: 1 | Status: CANCELLED | OUTPATIENT
Start: 2021-02-22

## 2021-02-22 NOTE — TELEPHONE ENCOUNTER
Received request via: Pharmacy    Was the patient seen in the last year in this department? Yes    Does the patient have an active prescription (recently filled or refills available) for medication(s) requested? No     Requested Prescriptions     Pending Prescriptions Disp Refills   • omeprazole (PRILOSEC) 40 MG delayed-release capsule 90 capsule 1     Sig: Take 1 capsule by mouth once daily

## 2021-03-02 LAB — TEST NAME 95000: NORMAL

## 2021-03-03 DIAGNOSIS — Z23 NEED FOR VACCINATION: ICD-10-CM

## 2021-07-02 ENCOUNTER — PATIENT OUTREACH (OUTPATIENT)
Dept: HEALTH INFORMATION MANAGEMENT | Facility: OTHER | Age: 71
End: 2021-07-02

## 2021-07-02 NOTE — NON-PROVIDER
Outcome:Called back to schedule home Assessment but it was passed 4:30 asked her if we could call her back Tuesday to schedule     Please transfer to Patient Outreach Team at [phone] when patient returns call.

## 2021-07-02 NOTE — PROGRESS NOTES
Outcome: Left Message To schedule Comprehensive health  Assessment      Please transfer to Patient Outreach Team at [phone] when patient returns call.      Attempt #1

## 2021-07-08 DIAGNOSIS — M25.50 ARTHRALGIA, UNSPECIFIED JOINT: ICD-10-CM

## 2021-07-08 RX ORDER — CELECOXIB 200 MG/1
CAPSULE ORAL
Qty: 100 CAPSULE | Refills: 1 | Status: SHIPPED | OUTPATIENT
Start: 2021-07-08 | End: 2021-07-27 | Stop reason: SDUPTHER

## 2021-07-13 ENCOUNTER — TELEPHONE (OUTPATIENT)
Dept: MEDICAL GROUP | Facility: MEDICAL CENTER | Age: 71
End: 2021-07-13

## 2021-07-13 DIAGNOSIS — L98.9 SKIN LESION: ICD-10-CM

## 2021-07-13 NOTE — TELEPHONE ENCOUNTER
Patient is having severe painful migraine on the left side. She also wants to know if she ca have a referral for dermatology because she has growths on her face and they sometimes burn. Please advise

## 2021-07-27 ENCOUNTER — OFFICE VISIT (OUTPATIENT)
Dept: MEDICAL GROUP | Facility: MEDICAL CENTER | Age: 71
End: 2021-07-27
Payer: MEDICARE

## 2021-07-27 VITALS
BODY MASS INDEX: 22.84 KG/M2 | RESPIRATION RATE: 16 BRPM | OXYGEN SATURATION: 95 % | WEIGHT: 121 LBS | HEIGHT: 61 IN | DIASTOLIC BLOOD PRESSURE: 70 MMHG | SYSTOLIC BLOOD PRESSURE: 120 MMHG | TEMPERATURE: 97.8 F | HEART RATE: 65 BPM

## 2021-07-27 DIAGNOSIS — M15.9 OSTEOARTHRITIS OF MULTIPLE JOINTS, UNSPECIFIED OSTEOARTHRITIS TYPE: ICD-10-CM

## 2021-07-27 DIAGNOSIS — M25.50 ARTHRALGIA, UNSPECIFIED JOINT: ICD-10-CM

## 2021-07-27 DIAGNOSIS — F33.41 RECURRENT MAJOR DEPRESSIVE DISORDER, IN PARTIAL REMISSION (HCC): ICD-10-CM

## 2021-07-27 DIAGNOSIS — G44.041 INTRACTABLE CHRONIC PAROXYSMAL HEMICRANIA: ICD-10-CM

## 2021-07-27 DIAGNOSIS — L98.9 SKIN LESION: ICD-10-CM

## 2021-07-27 PROCEDURE — 99214 OFFICE O/P EST MOD 30 MIN: CPT | Performed by: FAMILY MEDICINE

## 2021-07-27 RX ORDER — CELECOXIB 200 MG/1
CAPSULE ORAL
Qty: 100 CAPSULE | Refills: 1 | Status: SHIPPED | OUTPATIENT
Start: 2021-07-27 | End: 2022-02-16

## 2021-07-27 RX ORDER — SUMATRIPTAN 100 MG/1
100 TABLET, FILM COATED ORAL
Qty: 10 TABLET | Refills: 3 | Status: CANCELLED | OUTPATIENT
Start: 2021-07-27

## 2021-07-27 NOTE — PROGRESS NOTES
CC: Migraine headache, skin lesion, osteoarthritis, depression    HPI:   Janeen presents today to discuss the following medical issues:    Intractable chronic paroxysmal hemicrania  Patient has been having right-sided headache that usually gets worse with lights.  She has history of migraine, sumatriptan has not been helping.  When she gets a headache she gets to stay calm in a dark room.  Patient stating that she has been having more frequent episodes of migraine.    Skin lesion  Patient has been having multiple papular lesions on the face and the back.    Osteoarthritis of multiple joints, unspecified osteoarthritis type  Patient has been having a chronic multiple joint pain that comes and goes.  She stated that only celecoxib has been helping, patient needs medication refill    Recurrent major depressive disorder, in partial remission (HCC)  Patient reported more swinging of mood.  She has been Zoloft(half a tablet) 50 mg has not been helping.  She denies any suicidal ideation.     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     • Krill Oil (OMEGA-3) 500 MG Cap Take  by mouth.     • Flaxseed, Linseed, (FLAXSEED OIL PO) Take  by mouth.     • hydrOXYzine HCl (ATARAX) 50 MG Tab Take 1 Tab by mouth 3 times a day as needed. 270 Tab 3   • estradiol (ESTRACE) 0.5 MG tablet Take 1 Tab by mouth every day. 90 Tab 3   • albuterol 108 (90 Base) MCG/ACT Aero Soln inhalation aerosol Inhale 2 Puffs by mouth every 6 hours as needed. 8.5 g 11   • sertraline (ZOLOFT) 100 MG Tab Take 1 Tab by mouth every day. 90 Tab 3   • gabapentin (NEURONTIN) 300 MG Cap Take 2 Caps by mouth 3 times a day. (Patient taking differently: Take 600 mg by mouth 2 Times a Day.) 180 Cap 1     No current facility-administered medications for this visit.         Allergies as of 07/27/2021 - Reviewed 07/27/2021   Allergen Reaction Noted   • Other misc Shortness of Breath 12/03/2010   • Morphine Anaphylaxis 07/21/2017   • Soap Rash 12/03/2010        ROS: Denies any chest pain, Shortness of breath, Changes bowel or bladder, Lower extremity edema.    Physical Exam:  /70 (BP Location: Right arm, Patient Position: Sitting, BP Cuff Size: Adult)   Pulse 65   Temp 36.6 °C (97.8 °F) (Temporal)   Resp 16   Ht 1.549 m (5' 1\")   Wt 54.9 kg (121 lb)   SpO2 95%   BMI 22.86 kg/m²   Gen.: Well-developed, well-nourished, no apparent distress,pleasant and cooperative with the examination  Skin:  Warm and dry with good turgor.  Small papular lesions on the face  Eye: PERRLA, conjunctiva and sclera clear, lids normal  HEENT:Sinuses nontender with palpation, TMs clear, nares patent with pink mucosa, and clear rhinorrhea,no septal deviation ,polyps or lesions. lips without lesions, oropharynx clear.  Neck: Trachea midline,no masses or adenopathy. No JVD.  Thyroid: normal consistency and size. No masses or nodules. Not tender with palpation.  Cor: Regular rate and rhythm without murmur, gallop or rub.  Lungs: Respirations unlabored.Clear to auscultation with equal breath " sounds bilaterally. No wheezes, rhonchi.  Abdomen: Soft nontender without hepatosplenomegaly or masses appreciated, normoactive bowel sounds. No hernias.  Extremities: No cyanosis, clubbing or edema, Symmetrical without deformities or malformations. Pulses 2+ and symmetrical both upper and lower extremities  Lymphatic: No abnormal adenopathy of the neck, upper chest, groin or axillae.  Psych: Alert and oriented x 3.Flat affect,normal judgement,insight and memory.        Assessment and Plan.   71 y.o. female     1. Intractable chronic paroxysmal hemicrania  Patient with history of migraine, sumatriptan has not been helping  Currently asymptomatic however when she gets the headache nothing helps.    - REFERRAL TO NEUROLOGY    2. Skin lesion  Been having multiple papular lesions on the face and the back.    - REFERRAL TO DERMATOLOGY    3. Osteoarthritis of multiple joints, unspecified osteoarthritis type  Patient has probably osteoarthritis.  Only celecoxib has been helping, medication refilled.    - celecoxib (CELEBREX) 200 MG Cap; Take 1 capsule by mouth once daily  Dispense: 100 capsule; Refill: 1    5. Recurrent major depressive disorder, in partial remission (HCC)  Mood has been more fluctuating, Zoloft(half a tablet) 50 mg has  not been helping.  Patient advised to increase Zoloft to 100 mg daily(full tablet).

## 2021-09-03 DIAGNOSIS — M54.42 CHRONIC BILATERAL LOW BACK PAIN WITH LEFT-SIDED SCIATICA: ICD-10-CM

## 2021-09-03 DIAGNOSIS — G89.29 CHRONIC BILATERAL LOW BACK PAIN WITH LEFT-SIDED SCIATICA: ICD-10-CM

## 2021-09-03 DIAGNOSIS — E78.5 DYSLIPIDEMIA: Chronic | ICD-10-CM

## 2021-09-03 NOTE — TELEPHONE ENCOUNTER
Patient called and left a messaged stating she needs refills for her medications.     Received request via: Patient    Was the patient seen in the last year in this department? Yes    Does the patient have an active prescription (recently filled or refills available) for medication(s) requested? No     Requested Prescriptions     Pending Prescriptions Disp Refills   • atorvastatin (LIPITOR) 80 MG tablet 100 Tablet 2     Sig: TAKE 1 TABLET BY MOUTH ONCE DAILY IN THE EVENING   • baclofen (LIORESAL) 10 MG Tab 100 Tablet 0     Sig: Take 1 Tablet by mouth 3 times a day.   • traZODone (DESYREL) 50 MG Tab 100 Tablet 2     Sig: TAKE 1 TABLET BY MOUTH ONCE DAILY AT BEDTIME   • hydrOXYzine HCl (ATARAX) 50 MG Tab 270 Tablet 0     Sig: Take 1 Tablet by mouth 3 times a day as needed.

## 2021-09-07 RX ORDER — TRAZODONE HYDROCHLORIDE 50 MG/1
TABLET ORAL
Qty: 100 TABLET | Refills: 2 | Status: SHIPPED
Start: 2021-09-07 | End: 2022-03-14

## 2021-09-07 RX ORDER — HYDROXYZINE 50 MG/1
50 TABLET, FILM COATED ORAL 3 TIMES DAILY PRN
Qty: 270 TABLET | Refills: 0 | Status: SHIPPED | OUTPATIENT
Start: 2021-09-07 | End: 2022-06-15 | Stop reason: SDUPTHER

## 2021-09-07 RX ORDER — ATORVASTATIN CALCIUM 80 MG/1
TABLET, FILM COATED ORAL
Qty: 100 TABLET | Refills: 2 | Status: SHIPPED | OUTPATIENT
Start: 2021-09-07 | End: 2022-10-06

## 2021-09-07 RX ORDER — BACLOFEN 10 MG/1
10 TABLET ORAL 3 TIMES DAILY
Qty: 100 TABLET | Refills: 0 | Status: SHIPPED | OUTPATIENT
Start: 2021-09-07 | End: 2022-01-05

## 2021-10-04 ENCOUNTER — OFFICE VISIT (OUTPATIENT)
Dept: NEUROLOGY | Facility: MEDICAL CENTER | Age: 71
End: 2021-10-04
Attending: PSYCHIATRY & NEUROLOGY
Payer: MEDICARE

## 2021-10-04 VITALS
BODY MASS INDEX: 23.31 KG/M2 | OXYGEN SATURATION: 95 % | HEIGHT: 61 IN | TEMPERATURE: 98.3 F | HEART RATE: 72 BPM | DIASTOLIC BLOOD PRESSURE: 78 MMHG | SYSTOLIC BLOOD PRESSURE: 120 MMHG | RESPIRATION RATE: 16 BRPM | WEIGHT: 123.46 LBS

## 2021-10-04 DIAGNOSIS — G89.29 CHRONIC BILATERAL LOW BACK PAIN WITHOUT SCIATICA: ICD-10-CM

## 2021-10-04 DIAGNOSIS — M54.50 CHRONIC BILATERAL LOW BACK PAIN WITHOUT SCIATICA: ICD-10-CM

## 2021-10-04 DIAGNOSIS — G43.709 CHRONIC MIGRAINE W/O AURA, NOT INTRACTABLE, W/O STAT MIGR: ICD-10-CM

## 2021-10-04 DIAGNOSIS — Z87.820 HISTORY OF CLOSED HEAD INJURY: ICD-10-CM

## 2021-10-04 PROCEDURE — 99212 OFFICE O/P EST SF 10 MIN: CPT | Performed by: PSYCHIATRY & NEUROLOGY

## 2021-10-04 PROCEDURE — 99205 OFFICE O/P NEW HI 60 MIN: CPT | Performed by: PSYCHIATRY & NEUROLOGY

## 2021-10-04 ASSESSMENT — PATIENT HEALTH QUESTIONNAIRE - PHQ9: CLINICAL INTERPRETATION OF PHQ2 SCORE: 0
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
## 2021-12-03 ENCOUNTER — OFFICE VISIT (OUTPATIENT)
Dept: DERMATOLOGY | Facility: IMAGING CENTER | Age: 71
End: 2021-12-03
Payer: MEDICARE

## 2021-12-03 VITALS — WEIGHT: 120 LBS | BODY MASS INDEX: 22.66 KG/M2 | TEMPERATURE: 97.5 F | HEIGHT: 61 IN

## 2021-12-03 DIAGNOSIS — Z12.83 SKIN CANCER SCREENING: ICD-10-CM

## 2021-12-03 DIAGNOSIS — D22.9 NEVUS: ICD-10-CM

## 2021-12-03 DIAGNOSIS — L90.8 SKIN AGING: ICD-10-CM

## 2021-12-03 DIAGNOSIS — L81.4 LENTIGO: ICD-10-CM

## 2021-12-03 DIAGNOSIS — D49.2 NEOPLASM OF SKIN: ICD-10-CM

## 2021-12-03 PROCEDURE — 99203 OFFICE O/P NEW LOW 30 MIN: CPT | Mod: 25 | Performed by: DERMATOLOGY

## 2021-12-03 PROCEDURE — 11102 TANGNTL BX SKIN SINGLE LES: CPT | Performed by: DERMATOLOGY

## 2021-12-03 NOTE — PROGRESS NOTES
CC: Skin lesions      NOTE: patient missed appt at 12:00, arriving at 12:30pm. Worked in as add-on as a courtesy.    Subjective:new patient here for skin spots on face.     Patient here to establish care for several lesions on face and back.  Patient was referred by Dr. Myers.    Itching/picking on back/shoulders X years.     History of skin cancer: No  History of precancers/actinic keratoses: No  History of biopsies:No  History of blistering/severe sunburns:No  Family history of skin cancer:Yes, Details: Mother and Sister, does not recall types  Family history of atypical moles:No    ROS: no fevers/chills. ++ itch.  No cough  Relevant PMH: Fayette chorea, risk for falls.  Social:NS    PE: Gen:WDWN female in NAD. Skin: Scalp/face/eyes/lips/neck/chest/back/arms/legs/hands/feet/buttocks - examined. Genitals exam declined  -pearly papule - upper forehead, approx 5mm. + telangiectasia.  -scattered skin colored/variably hyperpigmented papules on face, extremities and torso/buttocks, appearing benign  -marks of excoriation and scarring on upper back/torso.  -tan macule post right leg, appearing benign    A/P:  Neoplasm NOS: forehead bcc  -consent for bx, including R/B/A. Cleaned with EtOH, anesthesia with lidocaine 1% + epinephrine, shave bx, AlCl3 for hemostasis  -vaseline/bandage and wound care reviewed    Itching, 's, neurodermatitis: back  -counseled on dx/tx dry skin; reports sensitive to many moisturizers  -lidex cream BID PRN itching, instructed to fill at [facility] or can be called to [facility] if cash pay is cheaper.  -gabapentin may not be good option due to risk for falls  -consider sarna+menthol for OTC if lidex cream is not covered/affordable or effective    Nevi: benign appearing:  -Reviewed skin cancer detection/prevention  -RTC PRN growth/changes/concerning features  -cosmetics removal for nevi without medical indication to remove    Lentigos: benign  -reassurance  -reviewed skin cancer  detection/prevention    F/u LETICIA 1 year/PRN    Provided information on food bank/food resources.       I have reviewed medications relevant to my specialty.

## 2021-12-09 ENCOUNTER — TELEPHONE (OUTPATIENT)
Dept: DERMATOLOGY | Facility: IMAGING CENTER | Age: 71
End: 2021-12-09

## 2021-12-09 NOTE — TELEPHONE ENCOUNTER
Patient notified of D- Path results benign , no further treatment is needed . Scanned in media tab

## 2021-12-15 NOTE — NON-PROVIDER
Outcome: Left Message    Please transfer to Patient Outreach Team at [phone] when patient returns call.    [facility] Verified: yes    Attempt # 2

## 2022-01-04 DIAGNOSIS — G89.29 CHRONIC BILATERAL LOW BACK PAIN WITH LEFT-SIDED SCIATICA: ICD-10-CM

## 2022-01-04 DIAGNOSIS — M54.42 CHRONIC BILATERAL LOW BACK PAIN WITH LEFT-SIDED SCIATICA: ICD-10-CM

## 2022-01-05 RX ORDER — BACLOFEN 10 MG/1
TABLET ORAL
Qty: 100 TABLET | Refills: 0 | Status: SHIPPED
Start: 2022-01-05 | End: 2022-03-14

## 2022-01-28 ENCOUNTER — TELEPHONE (OUTPATIENT)
Dept: MEDICAL GROUP | Facility: MEDICAL CENTER | Age: 72
End: 2022-01-28

## 2022-01-28 NOTE — TELEPHONE ENCOUNTER
ESTABLISHED PATIENT PRE-VISIT PLANNING     Annual Wellness Visit Over Due      Patient was NOT contacted to complete PVP.     Note: Patient will not be contacted if there is no indication to call.     1.  Reviewed notes from the last few office visits within the medical group: Yes    2.  If any orders were placed at last visit or intended to be done for this visit (i.e. 6 mos follow-up), do we have Results/Consult Notes?         •  Labs - Labs were not ordered at last office visit.Last office visit with PCP Provider  was on 07/27/2021.    Note: If patient appointment is for lab review and patient did not complete labs, check with provider if OK to reschedule patient until labs completed.       •  Imaging - Imaging was not ordered at last office visit.       •  Referrals - Referral ordered, patient was seen and consult notes are in chart. Care Teams updated  YES.      -Nuerology: Please reference Office visit Progress Notes by Provider Dr. Tam Marinelli M.D., encounter date 10/04/2021. Care Teams updated. Referral Status: Closed.      -Dermatology: Please reference Office Visit Progress Notes by Provider Dr. Ava Yao M.D., encounter date 12/03/2021. Care Teams updated. Referral Status: Complete.      3. Is this appointment scheduled as a Hospital Follow-Up? No    4.  Immunizations were updated in Epic using Reconcile Outside Information activity? Yes    5.  Patient is due for the following Health Maintenance Topics:   Health Maintenance Due   Topic Date Due   • MAMMOGRAM  12/18/2020   • Annual Wellness Visit  01/17/2021   • BONE DENSITY  01/11/2022       6.  AHA (Pulse[employer]) form printed for Provider? Yes

## 2022-01-31 ENCOUNTER — OFFICE VISIT (OUTPATIENT)
Dept: MEDICAL GROUP | Facility: MEDICAL CENTER | Age: 72
End: 2022-01-31
Payer: MEDICARE

## 2022-01-31 VITALS
WEIGHT: 123.9 LBS | TEMPERATURE: 97.1 F | BODY MASS INDEX: 23.39 KG/M2 | OXYGEN SATURATION: 98 % | DIASTOLIC BLOOD PRESSURE: 60 MMHG | RESPIRATION RATE: 16 BRPM | HEIGHT: 61 IN | HEART RATE: 66 BPM | SYSTOLIC BLOOD PRESSURE: 140 MMHG

## 2022-01-31 DIAGNOSIS — I10 HTN (HYPERTENSION), BENIGN: ICD-10-CM

## 2022-01-31 DIAGNOSIS — F33.41 RECURRENT MAJOR DEPRESSIVE DISORDER, IN PARTIAL REMISSION (HCC): ICD-10-CM

## 2022-01-31 DIAGNOSIS — Z78.0 ASYMPTOMATIC MENOPAUSAL STATE: ICD-10-CM

## 2022-01-31 DIAGNOSIS — Z12.31 ENCOUNTER FOR SCREENING MAMMOGRAM FOR MALIGNANT NEOPLASM OF BREAST: ICD-10-CM

## 2022-01-31 DIAGNOSIS — J45.20 MILD INTERMITTENT ASTHMA IN ADULT WITHOUT COMPLICATION: ICD-10-CM

## 2022-01-31 DIAGNOSIS — N18.31 STAGE 3A CHRONIC KIDNEY DISEASE: ICD-10-CM

## 2022-01-31 DIAGNOSIS — H91.93 HEARING PROBLEM OF BOTH EARS: ICD-10-CM

## 2022-01-31 DIAGNOSIS — R73.02 IGT (IMPAIRED GLUCOSE TOLERANCE): ICD-10-CM

## 2022-01-31 PROCEDURE — 99214 OFFICE O/P EST MOD 30 MIN: CPT | Performed by: FAMILY MEDICINE

## 2022-01-31 RX ORDER — ALBUTEROL SULFATE 90 UG/1
AEROSOL, METERED RESPIRATORY (INHALATION)
COMMUNITY
End: 2022-01-31

## 2022-01-31 RX ORDER — ONDANSETRON 4 MG/1
TABLET, ORALLY DISINTEGRATING ORAL
COMMUNITY
Start: 2022-01-06 | End: 2022-01-31

## 2022-01-31 RX ORDER — ONDANSETRON 4 MG/1
TABLET, ORALLY DISINTEGRATING ORAL
COMMUNITY
End: 2022-03-14

## 2022-01-31 ASSESSMENT — PATIENT HEALTH QUESTIONNAIRE - PHQ9: CLINICAL INTERPRETATION OF PHQ2 SCORE: 0

## 2022-02-01 NOTE — PROGRESS NOTES
"CC: Depression, CKD, hypertension, impaired glucose tolerance, asthma, hearing problem    HPI:   Janeen \"Midge\" presents today to discuss the following:    Recurrent major depressive disorder, in partial remission (HCC)  Patient's mood has been stable.  She denies any suicidal ideation.  She has been doing fine no side effects.  On Zoloft 50 mg daily.  She needs medication refilled.     Stage 3a chronic kidney disease (HCC)  Last GFR was 59, normal creatinine.  Patient has been asymptomatic    HTN (hypertension), benign  Blood pressure has been adequately controlled on low-salt diet    IGT (impaired glucose tolerance)  Last A1c was 5.9.  No history of diabetes.  Denies polyuria or polydipsia.    Mild intermittent asthma in adult without complication  Currently patient has been asymptomatic.  She has been on albuterol only .  She is non-smoker as needed    Hearing problem of both ears  She has been having deteriorating hearing loss, and that has been affecting her quality of life.  She wants to be evaluated by an audiologist    Due for mammogram on bone density        Patient Active Problem List    Diagnosis Date Noted   • Risk for falls 12/20/2017   • Hot flashes 03/02/2017   • Mixed incontinence 03/02/2017   • Recurrent major depressive disorder, in partial remission (HCC) 12/30/2016   • Asthma in adult 12/30/2016   • IGT (impaired glucose tolerance) 02/22/2013   • GERD (gastroesophageal reflux disease) 01/11/2010   • Dyslipidemia 01/11/2010   • HTN (hypertension), benign 01/11/2010   • Glaucoma 01/11/2010       Current Outpatient Medications   Medication Sig Dispense Refill   • ondansetron (ZOFRAN ODT) 4 MG TABLET DISPERSIBLE ondansetron 4 mg disintegrating tablet     • baclofen (LIORESAL) 10 MG Tab TAKE ONE TABLET BY MOUTH THREE TIMES DAILY 100 Tablet 0   • fluocinonide (LIDEX) 0.05 % Cream AAA posterior scalp, neck, back, BID PRN Itching. Avoid use on face, axilla, groin. 60 g 1   • Rimegepant Sulfate 75 MG " "TABLET DISPERSIBLE Take 1 Tablet by mouth 1 time a day as needed for up to 16 doses. 30 Tablet 12   • atorvastatin (LIPITOR) 80 MG tablet TAKE 1 TABLET BY MOUTH ONCE DAILY IN THE EVENING 100 Tablet 2   • traZODone (DESYREL) 50 MG Tab TAKE 1 TABLET BY MOUTH ONCE DAILY AT BEDTIME (Patient taking differently: 25 mg. per) 100 Tablet 2   • hydrOXYzine HCl (ATARAX) 50 MG Tab Take 1 Tablet by mouth 3 times a day as needed. 270 Tablet 0   • celecoxib (CELEBREX) 200 MG Cap Take 1 capsule by mouth once daily 100 capsule 1   • omeprazole (PRILOSEC) 40 MG delayed-release capsule Take 1 capsule by mouth once daily 90 capsule 3   • oxybutynin (DITROPAN) 5 MG Tab TAKE 1 TABLET BY MOUTH TWICE DAILY 180 tablet 3   • Biotin 5000 MCG Cap Take  by mouth.     • Krill Oil (OMEGA-3) 500 MG Cap Take  by mouth.     • Flaxseed, Linseed, (FLAXSEED OIL PO) Take  by mouth.     • estradiol (ESTRACE) 0.5 MG tablet Take 1 Tab by mouth every day. 90 Tab 3   • albuterol 108 (90 Base) MCG/ACT Aero Soln inhalation aerosol Inhale 2 Puffs by mouth every 6 hours as needed. 8.5 g 11   • sertraline (ZOLOFT) 100 MG Tab Take 1 Tab by mouth every day. 90 Tab 3   • gabapentin (NEURONTIN) 300 MG Cap Take 2 Caps by mouth 3 times a day. 180 Cap 1     No current facility-administered medications for this visit.         Allergies as of 01/31/2022 - Reviewed 12/03/2021   Allergen Reaction Noted   • Other misc Shortness of Breath 12/03/2010   • Morphine Anaphylaxis 07/21/2017   • Soap Rash 12/03/2010        ROS: Denies any chest pain, Shortness of breath, Changes bowel or bladder, Lower extremity edema.    Physical Exam:  /60 (BP Location: Right arm, Patient Position: Sitting, BP Cuff Size: Small adult)   Pulse 66   Temp 36.2 °C (97.1 °F) (Temporal)   Resp 16   Ht 1.549 m (5' 1\")   Wt 56.2 kg (123 lb 14.4 oz)   SpO2 98%   BMI 23.41 kg/m²   Gen.: Well-developed, well-nourished, no apparent distress,pleasant and cooperative with the examination  Skin:  " Warm and dry with good turgor. No rashes or suspicious lesions in visible areas  HEENT:Sinuses nontender with palpation, TMs clear, nares patent with pink mucosa and clear rhinorrhea,no septal deviation ,polyps or lesions. lips without lesions, oropharynx clear.  Neck: Trachea midline,no masses or adenopathy. No JVD.  Cor: Regular rate and rhythm without murmur, gallop or rub.  Lungs: Respirations unlabored.Clear to auscultation with equal breath sounds bilaterally. No wheezes, rhonchi.  Extremities: No cyanosis, clubbing or edema.      Assessment and Plan.   71 y.o. female     1. Recurrent major depressive disorder, in partial remission (HCC)  Stable.  No suicidal ideation.   Continue on Zoloft 50 mg daily.  Medication refilled.     - TSH; Future    2. Stage 3a chronic kidney disease (HCC)  Last GFR was 59, normal creatinine  Recommend hydration and avoiding nephrotoxic medication  Continue monitor kidney function    - Comp Metabolic Panel; Future    3. HTN (hypertension), benign  Blood pressure has been adequately controlled on low-salt diet    - CBC WITH DIFFERENTIAL; Future  - Comp Metabolic Panel; Future  - Lipid Profile; Future  - TSH; Future    4. IGT (impaired glucose tolerance)  Last A1c was 5.9.  Patient is counseled on lifestyle modification  We will continue monitor blood glucose    - HEMOGLOBIN A1C; Future    5. Mild intermittent asthma in adult without complication  Stable.  Continue on albuterol as needed      6. Encounter for screening mammogram for malignant neoplasm of breast    - MA-SCREENING MAMMO BILAT W/CAD; Future    7. Hearing problem of both ears    - Referral to Audiology    8. Asymptomatic menopausal state    - DS-BONE DENSITY STUDY (DEXA); Future

## 2022-02-11 ENCOUNTER — TELEPHONE (OUTPATIENT)
Dept: HEALTH INFORMATION MANAGEMENT | Facility: OTHER | Age: 72
End: 2022-02-11
Payer: MEDICARE

## 2022-02-16 DIAGNOSIS — M25.50 ARTHRALGIA, UNSPECIFIED JOINT: ICD-10-CM

## 2022-02-16 DIAGNOSIS — K21.9 GASTROESOPHAGEAL REFLUX DISEASE WITHOUT ESOPHAGITIS: Chronic | ICD-10-CM

## 2022-02-16 RX ORDER — OMEPRAZOLE 40 MG/1
CAPSULE, DELAYED RELEASE ORAL
Qty: 90 CAPSULE | Refills: 3 | Status: SHIPPED | OUTPATIENT
Start: 2022-02-16 | End: 2023-05-18

## 2022-02-16 RX ORDER — CELECOXIB 200 MG/1
CAPSULE ORAL
Qty: 100 CAPSULE | Refills: 3 | Status: SHIPPED | OUTPATIENT
Start: 2022-02-16 | End: 2023-06-05

## 2022-02-24 NOTE — TELEPHONE ENCOUNTER
Member called and stated her [employer] did not show. I did request another [employer]. I also reached out to the [facility] office to inform them. I was advised to reschedule her appointment. Rescheduled her on 03/02/22 at 1pm with Dr. Richardson. HIPAA verified.

## 2022-03-02 PROBLEM — M51.369 DEGENERATIVE DISC DISEASE, LUMBAR: Status: ACTIVE | Noted: 2022-03-02

## 2022-03-02 PROBLEM — S09.90XA CLOSED HEAD INJURY: Status: ACTIVE | Noted: 2022-03-02

## 2022-03-02 PROBLEM — J41.1 MUCOPURULENT CHRONIC BRONCHITIS (HCC): Status: ACTIVE | Noted: 2022-03-02

## 2022-03-02 PROBLEM — F13.20 SEDATIVE, HYPNOTIC, OR ANXIOLYTIC DEPENDENCE (HCC): Status: ACTIVE | Noted: 2022-03-02

## 2022-03-02 PROBLEM — M19.049 OSTEOARTHRITIS OF HAND: Status: ACTIVE | Noted: 2022-03-02

## 2022-03-02 PROBLEM — E55.9 VITAMIN D DEFICIENCY: Status: ACTIVE | Noted: 2022-03-02

## 2022-03-02 PROBLEM — M51.36 DEGENERATIVE DISC DISEASE, LUMBAR: Status: ACTIVE | Noted: 2022-03-02

## 2022-03-14 ENCOUNTER — OFFICE VISIT (OUTPATIENT)
Dept: MEDICAL GROUP | Facility: MEDICAL CENTER | Age: 72
End: 2022-03-14
Payer: MEDICARE

## 2022-03-14 VITALS
SYSTOLIC BLOOD PRESSURE: 118 MMHG | HEIGHT: 60 IN | BODY MASS INDEX: 24.35 KG/M2 | HEART RATE: 61 BPM | WEIGHT: 124 LBS | OXYGEN SATURATION: 94 % | DIASTOLIC BLOOD PRESSURE: 62 MMHG | TEMPERATURE: 98.9 F

## 2022-03-14 DIAGNOSIS — F13.20 SEDATIVE, HYPNOTIC, OR ANXIOLYTIC DEPENDENCE (HCC): Chronic | ICD-10-CM

## 2022-03-14 DIAGNOSIS — M54.42 CHRONIC BILATERAL LOW BACK PAIN WITH LEFT-SIDED SCIATICA: ICD-10-CM

## 2022-03-14 DIAGNOSIS — G89.29 CHRONIC BILATERAL LOW BACK PAIN WITH LEFT-SIDED SCIATICA: ICD-10-CM

## 2022-03-14 DIAGNOSIS — S09.90XS CLOSED HEAD INJURY, SEQUELA: ICD-10-CM

## 2022-03-14 DIAGNOSIS — R54 FRAIL ELDERLY: ICD-10-CM

## 2022-03-14 DIAGNOSIS — Z91.81 RISK FOR FALLS: ICD-10-CM

## 2022-03-14 DIAGNOSIS — N39.46 MIXED INCONTINENCE: ICD-10-CM

## 2022-03-14 PROBLEM — J41.1 MUCOPURULENT CHRONIC BRONCHITIS (HCC): Chronic | Status: ACTIVE | Noted: 2022-03-02

## 2022-03-14 PROCEDURE — 99214 OFFICE O/P EST MOD 30 MIN: CPT | Performed by: FAMILY MEDICINE

## 2022-03-14 RX ORDER — ESTRADIOL 0.5 MG/1
0.5 TABLET ORAL DAILY
Qty: 90 TABLET | Refills: 3 | Status: SHIPPED | OUTPATIENT
Start: 2022-03-14 | End: 2023-03-15

## 2022-03-14 RX ORDER — TRAZODONE HYDROCHLORIDE 50 MG/1
25 TABLET ORAL NIGHTLY
Qty: 90 TABLET | Refills: 1 | Status: SHIPPED | OUTPATIENT
Start: 2022-03-14 | End: 2022-07-14 | Stop reason: SDUPTHER

## 2022-03-14 RX ORDER — BACLOFEN 10 MG/1
10 TABLET ORAL DAILY
Qty: 100 TABLET | Refills: 1 | Status: SHIPPED | OUTPATIENT
Start: 2022-03-14 | End: 2022-10-19 | Stop reason: SDUPTHER

## 2022-03-14 NOTE — PROGRESS NOTES
"Subjective:     CC: \"Follow-up from comprehensive health assessment\"    HPI:   Janeen Ashley presents today with valuation management of:    Problem   Frail Elderly   Closed Head Injury    Patient had apparent head injury in the 1980's. She had a recent comprehensive health assessment and failed the mini cog. She tells me she doesn't get lost at home but sometimes gets disoriented when she is out. She finds she is getting more forgetful in general.     Mucopurulent Chronic Bronchitis (Hcc)   Sedative, Hypnotic, Or Anxiolytic Dependence ([facility])   Risk for Falls    She has to stop and rest when she goes on walks with a friend. Fell today, she tripped over shoes in her house. She is working on [employer]. She endorses frequent falls and feeling unsteady.     Recurrent major depressive disorder, in partial remission ([facility])   Chronic Kidney Disease (Ckd) Stage G3a/A1, Moderately Decreased Glomerular Filtration Rate (Gfr) Between 45-59 Ml/Min/1.73 Square Meter and Albuminuria Creatinine Ratio Less Than 30 Mg/G ([facility])       Current Outpatient Medications Ordered in Epic   Medication Sig Dispense Refill   • baclofen (LIORESAL) 10 MG Tab Take 1 Tablet by mouth every day. 100 Tablet 1   • estradiol (ESTRACE) 0.5 MG tablet Take 1 Tablet by mouth every day. 90 Tablet 3   • traZODone (DESYREL) 50 MG Tab Take 0.5 Tablets by mouth every evening. per 90 Tablet 1   • omeprazole (PRILOSEC) 40 MG delayed-release capsule TAKE ONE CAPSULE BY MOUTH ONE TIME DAILY 90 Capsule 3   • celecoxib (CELEBREX) 200 MG Cap TAKE ONE CAPSULE BY MOUTH ONE TIME DAILY 100 Capsule 3   • fluocinonide (LIDEX) 0.05 % Cream AAA posterior scalp, neck, back, BID PRN Itching. Avoid use on face, axilla, groin. 60 g 1   • Rimegepant Sulfate 75 MG TABLET DISPERSIBLE Take 1 Tablet by mouth 1 time a day as needed for up to 16 doses. 30 Tablet 12   • atorvastatin (LIPITOR) 80 MG tablet TAKE 1 TABLET BY MOUTH ONCE DAILY IN THE EVENING 100 Tablet 2   • hydrOXYzine HCl " "(ATARAX) 50 MG Tab Take 1 Tablet by mouth 3 times a day as needed. 270 Tablet 0   • oxybutynin (DITROPAN) 5 MG Tab TAKE 1 TABLET BY MOUTH TWICE DAILY 180 tablet 3   • Biotin 5000 MCG Cap Take  by mouth.     • Krill Oil (OMEGA-3) 500 MG Cap Take  by mouth.     • Flaxseed, Linseed, (FLAXSEED OIL PO) Take  by mouth.     • albuterol 108 (90 Base) MCG/ACT Aero Soln inhalation aerosol Inhale 2 Puffs by mouth every 6 hours as needed. 8.5 g 11   • sertraline (ZOLOFT) 100 MG Tab Take 1 Tab by mouth every day. 90 Tab 3     No current Epic-ordered facility-administered medications on file.       Health Maintenance: Reminded patient to make appointment for mammogram    ROS:  ROS see HPI    Objective:     Exam:  /62 (BP Location: Left arm, Patient Position: Sitting, BP Cuff Size: Adult)   Pulse 61   Temp 37.2 °C (98.9 °F) (Temporal)   Ht 1.511 m (4' 11.5\")   Wt 56.2 kg (124 lb)   SpO2 94%   BMI 24.63 kg/m²  Body mass index is 24.63 kg/m².    Physical Exam  Vitals reviewed.   Constitutional:       General: She is not in acute distress.     Appearance: Normal appearance.   HENT:      Head: Normocephalic and atraumatic.   Cardiovascular:      Rate and Rhythm: Normal rate and regular rhythm.      Heart sounds: Normal heart sounds.   Pulmonary:      Effort: Pulmonary effort is normal.      Breath sounds: Normal breath sounds.   Neurological:      Mental Status: She is alert. Mental status is at baseline.   Psychiatric:         Mood and Affect: Mood normal.         Behavior: Behavior normal.           Assessment & Plan:     71 y.o. female with the following -     Problem List Items Addressed This Visit     Sedative, hypnotic, or anxiolytic dependence (HCC) (Chronic)     Patient tells me she only takes baclofen 10 mg once a day which is fairly minimal to help control her symptoms.  She also uses half a tab of trazodone 25 mg nightly to help her sleep.         Mixed incontinence    Relevant Orders    Referral to General " Surgery    Risk for falls     Face-to-face documentation and note and order for walker         Relevant Orders    DME Walker    Closed head injury     Referral to neurology         Relevant Orders    Referral to Neurology    Frail elderly     Patient recently had comprehensive health exam and qualified for being frail.  We will need to make sure she is appropriately supported.         Relevant Orders    DME Walker      Other Visit Diagnoses     Chronic bilateral low back pain with left-sided sciatica        Relevant Medications    baclofen (LIORESAL) 10 MG Tab    traZODone (DESYREL) 50 MG Tab            No follow-ups on file.    Please note that this dictation was created using voice recognition software. I have made every reasonable attempt to correct obvious errors, but I expect that there are errors of grammar and possibly content that I did not discover before finalizing the note.      Face to Face Note  -  Durable Medical Equipment    Rick Wright D.O. - NPI: [id number]  I certify that this patient is under my care and that they had a durable medical equipment(DME)face to face encounter by myself that meets the physician DME face-to-face encounter requirements with this patient on:    Date of encounter:   Patient:                    MRN:                       YOB: 2021  Janeen Ashley Ritter  [id number]  1950     The encounter with the patient was in whole, or in part, for the following medical condition, which is the primary reason for durable medical equipment:  Other - Medically Frail, Fall Risk    I certify that, based on my findings, the following durable medical equipment is medically necessary:  Walkers.      My Clinical findings support the need for the above equipment due to:  Abnormal Gait, Frequent Falls

## 2022-03-14 NOTE — ASSESSMENT & PLAN NOTE
Patient tells me she only takes baclofen 10 mg once a day which is fairly minimal to help control her symptoms.  She also uses half a tab of trazodone 25 mg nightly to help her sleep.

## 2022-03-14 NOTE — ASSESSMENT & PLAN NOTE
Patient recently had comprehensive health exam and qualified for being frail.  We will need to make sure she is appropriately supported.

## 2022-04-11 ENCOUNTER — TELEPHONE (OUTPATIENT)
Dept: MEDICAL GROUP | Facility: MEDICAL CENTER | Age: 72
End: 2022-04-11
Payer: MEDICARE

## 2022-04-11 NOTE — TELEPHONE ENCOUNTER
"ESTABLISHED PATIENT PRE-VISIT PLANNING     Phone Number Called: [phone] (home)   Call outcome: Spoke to patient regarding message below.  Message: Appointment scheduled with , Tuesday, 4/12/2022 at 9:00AM, [address], [place]. Arrive 10-15 minutes early for check-in.     Informed patient how to activate [employer] & offered at time of call to walk patient thru setup process, patient declined. Also advised for in person assistance, when she comes in for her appointment tomorrow 4/12/2022, our office can help assist her with the down loading of the [employer] jazmyne to her cell phone device.      Patient was contacted to complete PVP.     Note: Patient will not be contacted if there is no indication to call.     1.  Reviewed notes from the last few office visits within the medical group: Yes    2.  If any orders were placed at last visit or intended to be done for this visit (i.e. 6 mos follow-up), do we have Results/Consult Notes?         •  Labs - Future Labs ordered. Per Dr. Myers's Office Visit Progress Notes on 01/31/2022: \"Return in about 6 months(around 7/31/2022).\"  Note: If patient appointment is for lab review and patient did not complete labs, check with provider if OK to reschedule patient until labs completed.       •  Imaging - Imaging ordered, NOT completed. Patient advised to complete prior to next appointment.    -MAMMO: Not Scheduled per Appointment Desk Tab   -(DEXA) Bone Density: Not Scheduled per Appointment Desk Tab    During Pre-Visit Planning call to patient, gave phone number for [facility] Scheduling Teams: [phone]. Patient is to call when ready to schedule. MyChart not active.         •  Referrals - Referral ordered, patient has NOT been seen.    -Audiology: Called and spoke to Dr.Michael MYLES Colin's office. Per their staff, patient has been rescheduled to 5/4/2022.    3. Is this appointment scheduled as a Hospital Follow-Up? No    4.  Immunizations were updated in Epic using " Reconcile Outside Information activity? No, [place]: Registry unable to satisfactorily match patient for request.    5.  Patient is due for the following Health Maintenance Topics:   Health Maintenance Due   Topic Date Due   • Annual Pulmonary Function Test / Spirometry  Never done   • MAMMOGRAM  12/18/2020   • IMM PNEUMOCOCCAL VACCINE: 65+ Years (3 - PPSV23 or PCV20) 04/06/2021   • BONE DENSITY  01/11/2022       6.  AHA (Pulse8) form printed for Provider? No, already completed AHA Done  1/31/2022.

## 2022-04-12 ENCOUNTER — HOSPITAL ENCOUNTER (OUTPATIENT)
Dept: LAB | Facility: MEDICAL CENTER | Age: 72
End: 2022-04-12
Attending: FAMILY MEDICINE
Payer: MEDICARE

## 2022-04-12 ENCOUNTER — OFFICE VISIT (OUTPATIENT)
Dept: MEDICAL GROUP | Facility: MEDICAL CENTER | Age: 72
End: 2022-04-12
Payer: MEDICARE

## 2022-04-12 VITALS
SYSTOLIC BLOOD PRESSURE: 144 MMHG | HEART RATE: 58 BPM | OXYGEN SATURATION: 98 % | BODY MASS INDEX: 24.39 KG/M2 | WEIGHT: 124.2 LBS | HEIGHT: 60 IN | TEMPERATURE: 96.4 F | DIASTOLIC BLOOD PRESSURE: 68 MMHG

## 2022-04-12 DIAGNOSIS — Z59.819 HOUSING INSTABILITY: ICD-10-CM

## 2022-04-12 DIAGNOSIS — M85.89 OSTEOPENIA OF MULTIPLE SITES: ICD-10-CM

## 2022-04-12 DIAGNOSIS — Z12.31 ENCOUNTER FOR SCREENING MAMMOGRAM FOR MALIGNANT NEOPLASM OF BREAST: ICD-10-CM

## 2022-04-12 DIAGNOSIS — M51.36 DEGENERATIVE DISC DISEASE, LUMBAR: ICD-10-CM

## 2022-04-12 DIAGNOSIS — M19.041 OSTEOARTHRITIS OF BOTH HANDS, UNSPECIFIED OSTEOARTHRITIS TYPE: ICD-10-CM

## 2022-04-12 DIAGNOSIS — R54 FRAIL ELDERLY: ICD-10-CM

## 2022-04-12 DIAGNOSIS — Z78.0 POSTMENOPAUSAL: ICD-10-CM

## 2022-04-12 DIAGNOSIS — Z23 NEED FOR VACCINATION: ICD-10-CM

## 2022-04-12 DIAGNOSIS — Z91.81 RISK FOR FALLS: ICD-10-CM

## 2022-04-12 DIAGNOSIS — G44.89 OTHER HEADACHE SYNDROME: ICD-10-CM

## 2022-04-12 DIAGNOSIS — I10 HTN (HYPERTENSION), BENIGN: ICD-10-CM

## 2022-04-12 DIAGNOSIS — M19.042 OSTEOARTHRITIS OF BOTH HANDS, UNSPECIFIED OSTEOARTHRITIS TYPE: ICD-10-CM

## 2022-04-12 LAB — ERYTHROCYTE [SEDIMENTATION RATE] IN BLOOD BY WESTERGREN METHOD: 8 MM/HOUR (ref 0–25)

## 2022-04-12 PROCEDURE — 36415 COLL VENOUS BLD VENIPUNCTURE: CPT

## 2022-04-12 PROCEDURE — 90732 PPSV23 VACC 2 YRS+ SUBQ/IM: CPT | Performed by: FAMILY MEDICINE

## 2022-04-12 PROCEDURE — G0009 ADMIN PNEUMOCOCCAL VACCINE: HCPCS | Performed by: FAMILY MEDICINE

## 2022-04-12 PROCEDURE — 85652 RBC SED RATE AUTOMATED: CPT

## 2022-04-12 PROCEDURE — 99214 OFFICE O/P EST MOD 30 MIN: CPT | Mod: 25 | Performed by: FAMILY MEDICINE

## 2022-04-12 RX ORDER — VALSARTAN 40 MG/1
40 TABLET ORAL DAILY
Qty: 100 TABLET | Refills: 0 | Status: SHIPPED | OUTPATIENT
Start: 2022-04-12 | End: 2022-07-14 | Stop reason: SDUPTHER

## 2022-04-12 SDOH — ECONOMIC STABILITY - HOUSING INSECURITY: HOUSING INSTABILITY UNSPECIFIED: Z59.819

## 2022-04-12 NOTE — ASSESSMENT & PLAN NOTE
Patient trying to clean up her apartment so she does not get infected but it is painful for her.  We will send her to [facility]NR to see if we get assistance with this.  This is a very old problem.

## 2022-04-12 NOTE — PROGRESS NOTES
"Subjective:     CC: \"Headache\"    HPI:   Janeen Ashley presents today with evaluation management of:    Problem   Osteopenia of Multiple Sites   Other Headache Syndrome    Patient tells me stress is clouding her thinking.  She is getting frequent headaches.  She is working on getting back on her head at medication and reestablishing with her neurologist.     Housing Instability    Patient's been on the same place for many years.  She feels bullied by current management.  She feels like they are not keeping up with their end of the DL and forcing her to make a lot of changes under threat of eviction     Osteoarthritis of Hand   Degenerative Disc Disease, Lumbar   Risk for Falls    She has to stop and rest when she goes on walks with a friend. Fell today, she tripped over shoes in her house. She is working on decluttering. She endorses frequent falls and feeling unsteady.     Htn (Hypertension), Benign    Blood pressure today 144/68  She is not currently on any medications  She is concerned that it is running high at home but has not been fully checking         Current Outpatient Medications Ordered in Epic   Medication Sig Dispense Refill   • valsartan (DIOVAN) 40 MG Tab Take 1 Tablet by mouth every day. 100 Tablet 0   • baclofen (LIORESAL) 10 MG Tab Take 1 Tablet by mouth every day. 100 Tablet 1   • estradiol (ESTRACE) 0.5 MG tablet Take 1 Tablet by mouth every day. 90 Tablet 3   • traZODone (DESYREL) 50 MG Tab Take 0.5 Tablets by mouth every evening. per 90 Tablet 1   • omeprazole (PRILOSEC) 40 MG delayed-release capsule TAKE ONE CAPSULE BY MOUTH ONE TIME DAILY 90 Capsule 3   • celecoxib (CELEBREX) 200 MG Cap TAKE ONE CAPSULE BY MOUTH ONE TIME DAILY 100 Capsule 3   • fluocinonide (LIDEX) 0.05 % Cream AAA posterior scalp, neck, back, BID PRN Itching. Avoid use on face, axilla, groin. 60 g 1   • Rimegepant Sulfate 75 MG TABLET DISPERSIBLE Take 1 Tablet by mouth 1 time a day as needed for up to 16 doses. 30 Tablet 12 " "  • atorvastatin (LIPITOR) 80 MG tablet TAKE 1 TABLET BY MOUTH ONCE DAILY IN THE EVENING 100 Tablet 2   • hydrOXYzine HCl (ATARAX) 50 MG Tab Take 1 Tablet by mouth 3 times a day as needed. 270 Tablet 0   • oxybutynin (DITROPAN) 5 MG Tab TAKE 1 TABLET BY MOUTH TWICE DAILY 180 tablet 3   • Biotin 5000 MCG Cap Take  by mouth.     • Krill Oil (OMEGA-3) 500 MG Cap Take  by mouth.     • Flaxseed, Linseed, (FLAXSEED OIL PO) Take  by mouth.     • albuterol 108 (90 Base) MCG/ACT Aero Soln inhalation aerosol Inhale 2 Puffs by mouth every 6 hours as needed. 8.5 g 11   • sertraline (ZOLOFT) 100 MG Tab Take 1 Tab by mouth every day. 90 Tab 3     No current Epic-ordered facility-administered medications on file.       Health Maintenance: PPSV-23 today    ROS:  ROS see HPI    Objective:     Exam:  /68   Pulse (!) 58   Temp (!) 35.8 °C (96.4 °F) (Temporal)   Ht 1.511 m (4' 11.5\")   Wt 56.3 kg (124 lb 3.2 oz)   SpO2 98%   BMI 24.67 kg/m²  Body mass index is 24.67 kg/m².    Physical Exam  Vitals reviewed.   Constitutional:       General: She is not in acute distress.     Appearance: Normal appearance.   HENT:      Head: Normocephalic and atraumatic.   Eyes:      Extraocular Movements: Extraocular movements intact.      Pupils: Pupils are equal, round, and reactive to light.      Comments: Difficulty concentrating on task but had intact EOM   Cardiovascular:      Rate and Rhythm: Normal rate and regular rhythm.      Heart sounds: Normal heart sounds.   Pulmonary:      Effort: Pulmonary effort is normal.      Breath sounds: Normal breath sounds.   Neurological:      General: No focal deficit present.      Mental Status: She is alert.      Cranial Nerves: No cranial nerve deficit.      Sensory: No sensory deficit.      Motor: No weakness.      Coordination: Coordination normal.      Gait: Gait normal.           Assessment & Plan:     71 y.o. female with the following -     Problem List Items Addressed This Visit     HTN " (hypertension), benign (Chronic)     We will start low-dose valsartan at bedtime  Patient to monitor blood pressure at home         Relevant Medications    valsartan (DIOVAN) 40 MG Tab    Risk for falls     She was told Walker is a luxury item which is complete BS for someone to say that to her or for insurance to determine that, though I doubt anyone is reading this I am frankly disgusted by her medical system.  I provided her another order for a walker.         Relevant Orders    DME Walker    Osteoarthritis of hand     Patient currently has hand pain bilaterally is making difficult for her to do anything going to refer to PMNR to see if they can help her with this         Relevant Orders    Referral to Physiatry (PMR)    Degenerative disc disease, lumbar     Patient trying to clean up her apartment so she does not get infected but it is painful for her.  We will send her to PMNR to see if we get assistance with this.  This is a very old problem.         Relevant Orders    Referral to Physiatry (PMR)    Frail elderly    Relevant Orders    DME Walker    Osteopenia of multiple sites     Resubmitted order for bone scan         Relevant Orders    DS-BONE DENSITY STUDY (DEXA)    Other headache syndrome     New order for headache clinic as she prefer to see a different neurologist         Relevant Orders    Sed Rate    Referral to Neurology    Housing instability     Referral to social work         Relevant Orders    REFERRAL TO CARE MANAGEMENT      Other Visit Diagnoses     Encounter for screening mammogram for malignant neoplasm of breast        Relevant Orders    MA-SCREENING MAMMO BILAT W/TOMOSYNTHESIS W/CAD    Postmenopausal        Relevant Orders    DS-BONE DENSITY STUDY (DEXA)    Need for vaccination        Relevant Orders    Pneumococal Polysaccharide Vaccine 23-Valent =>1YO SQ/IM (Completed)          I spent a total of 33 minutes with record review, exam, communication with the patient, communication with other  providers, and documentation of this encounter.      Return in about 3 months (around 7/12/2022).    Please note that this dictation was created using voice recognition software. I have made every reasonable attempt to correct obvious errors, but I expect that there are errors of grammar and possibly content that I did not discover before finalizing the note.

## 2022-04-12 NOTE — ASSESSMENT & PLAN NOTE
Patient currently has hand pain bilaterally is making difficult for her to do anything going to refer to [facility]NR to see if they can help her with this

## 2022-04-12 NOTE — ASSESSMENT & PLAN NOTE
She was told Walker is a luxury item which is complete BS for someone to say that to her or for insurance to determine that, though I doubt anyone is reading this I am frankly disgusted by her medical system.  I provided her another order for a walker.

## 2022-04-13 ENCOUNTER — PATIENT OUTREACH (OUTPATIENT)
Dept: HEALTH INFORMATION MANAGEMENT | Facility: OTHER | Age: 72
End: 2022-04-13
Payer: MEDICARE

## 2022-04-13 NOTE — PROGRESS NOTES
4/13 @1344   SW received referral from PCP- housing issues.  Notes indicating that Janeen has been trying to access a walker. Dr. Wright has placed an order.   SW called DME company [facility] and spoke to Barbara. SW informed that they need an updated DME order stating what kind of walker MD is ordering (2 wheel, 4 wheel, or seated).     @1016  SW messaged Dr. Wright provided update above and requested updated order to be sent to [facility].     SW to follow up with DME referral status.     @1026  SW called Janeen's mobile phone on file and no answer. SW left a voicemail with name and number requesting a call back.     @1031  SW called Janeen's home phone on file and no answer. SW left a voicemail with name and number requesting a call back.     @1232  SW received message from Dr. Wright that DME order has been updated. Requesting a front wheel walker with a seat.   SW to follow up with [facility] and ensure they received it and follow up on status of referral.     @1330  SW called [facility] to follow up with DME- front wheel walker referral. [facility] staff requested SW's call back number for them to call back with an update.

## 2022-04-14 DIAGNOSIS — Z91.81 RISK FOR FALLS: ICD-10-CM

## 2022-04-14 DIAGNOSIS — R54 FRAIL ELDERLY: ICD-10-CM

## 2022-04-14 NOTE — PROGRESS NOTES
4/14 @6501  TRACIE called [facility] and was informed that they didn't receive updated DME order. The last order they received is from a month ago. They are also requesting the order to be updated to four wheel walker with a seat.     @1315  TRACIE messaged Dr. Wright requesting order be updated to four wheel walker with a seat.   TRACIE will follow up with [facility] to see if they received the order. If updated order is not received, TRACIE will fax it manually.

## 2022-04-15 ENCOUNTER — TELEPHONE (OUTPATIENT)
Dept: MEDICAL GROUP | Facility: MEDICAL CENTER | Age: 72
End: 2022-04-15
Payer: MEDICARE

## 2022-04-15 NOTE — TELEPHONE ENCOUNTER
DME 4-Wheeled Walker w/ Seat order, Recent Office Visit Notes, ID/Insurance Cards, and Face Sheet has been sent to the following:    [facility] [place] [place] [address] Ph. [phone] Fax. [phone] or [phone]    Patient informed? N/A

## 2022-04-19 NOTE — PROGRESS NOTES
4/19 @1895  Dr. Wright has been able to update order and has sent it to [facility] on 4/14.  Neville MÁRQUEZ has also faxed- DME 4-Wheeled Walker w/ Seat order, Recent Office Visit Notes, ID/Insurance Cards, and Face Sheet     SW called [facility] to follow up on status of referral. SW spoke to     @0139

## 2022-04-20 ENCOUNTER — HOSPITAL ENCOUNTER (OUTPATIENT)
Dept: RADIOLOGY | Facility: MEDICAL CENTER | Age: 72
End: 2022-04-20
Attending: FAMILY MEDICINE
Payer: MEDICARE

## 2022-04-20 DIAGNOSIS — M85.89 OSTEOPENIA OF MULTIPLE SITES: ICD-10-CM

## 2022-04-20 DIAGNOSIS — Z12.31 ENCOUNTER FOR SCREENING MAMMOGRAM FOR MALIGNANT NEOPLASM OF BREAST: ICD-10-CM

## 2022-04-20 DIAGNOSIS — Z78.0 POSTMENOPAUSAL: ICD-10-CM

## 2022-04-20 PROCEDURE — 77080 DXA BONE DENSITY AXIAL: CPT

## 2022-04-20 PROCEDURE — 77063 BREAST TOMOSYNTHESIS BI: CPT

## 2022-04-25 NOTE — PROGRESS NOTES
4/25 @5766  TRACIE called home phone on file, no answer. TRACIE left a voicemail with name and number for call back.     TRACIE called mobile phone on file, no answer.

## 2022-04-27 NOTE — PROGRESS NOTES
4/27  SW attempted to call Janeen on the mobile phone and home phone on file. No answer.   SW will be mailing out contact letter. If a response is not received in 14 days (5/11) SW will be closing referral.

## 2022-05-05 NOTE — PROGRESS NOTES
Social Work Assessment  Community Care Management    Synopsis: TRACIE received referral from PCP- housing issues. TRACIE attempted to get a hold of Ashley multiple times and was not successful. TRACIE then mailed out a contact letter and Ashley reached out to TRACIE. Ashley stating that she needs assistance with cleaning her house. She has made a lot of donations and states to have already received help from her family with cleaning her apartment. However, [employer] has still been requesting for her to clean her apartment and she is not able to the deep cleaning. Janeen states that [facility] gave her a 10 day notice a couple months ago to complete a few tasks such as clean the windows, and make her apartment more livable.      Living Situation/Home Environment: Janeen lives in a 2 bedroom apartment and is receiving housing resources through [employer]. She is receiving assistance with her housing and only pays $358 a month for her rent.   Financial Situation/Sources of Income: Janeen reports to receive monthly SSI in the amount of $1100.   Transportation: Unable to confirm   Support System: Janeen states to have family in [place] and a granddaughter that lives in [place].   Mental Health/Substance Abuse Hx: None reported.   Ability to Obtain Basic Resources: Janeen is confident in her ability to obtain basic resources, but sometimes does need a little assistance.   Physical Functioning: Janeen states to have a hard time with household chores.   Patient's Perception of Needs: Janeen engaged in conversation and open to learning about resources that are available in the community.     Janeen has concerns with [facility] coming in her apartment and taking pictures. TRACIE explained that [facility] is required to complete inspections and ensure that tenants are compliant with their contract. It is not unusual that they are taking pictures. Janeen states to have requested assistance with getting her light switch fixed and a few  other household repairs that they don't follow through with. TRACIE and Janeen discussed talking to someone from [facility] or [place] [employer] to see if they would be able to provide any legal advice. Janeen feels that she is being treated unfairly and they are requiring her to do so much with cleaning that she is not able to do.     TRACIE and Janeen discussed looking for a cleaning agency that would be able to come and assist with a deep cleaning. If [facility] is stating that if she doesn't clean her apartment she will lose her housing, then Janeen needs to act quick and identify a cleaning company or request for family or friends to assist with cleaning her apartment. Janeen hesitating to reach out to family. SW explained that her housing is at risk and she needs to start thinking about what her housing options are if she is non compliant with [facility].     Janeen also requesting an upright walker. TRACIE explained that TRACIE was informed that [facility] does not have those in stock and TRACIE is unsure if insurance will cover this walker. SW to follow up with [facility].      Janeen stated she would talk to family and is also open to paying out of pocket for someone to clean her apartment.     Plan: TRACIE to mail out homemaker list, information for [facility] and [place] [employer], and search for a cleaning company and collect quotes for a deep clean    @1412  TRACIE able to talk to staff with Radha H[facility] of [facility] and informed TRACIE that a deep cleaning for a one bed one bath can take 1- 1.5 hours and averages- $195-260.     @1419  [facility] called [facility] and provided Radha Horne update and telephone number. Janeen informed TRACIE that she has a two bedroom. TRACIE suggested Janeen called and gathered a quote for a two bedroom.

## 2022-05-05 NOTE — PROGRESS NOTES
"@1527  TRACIE informed by Ashley that she has been working with Christine Steed with [facility] and provided SW number for Christine.     TRACIE called [facility] and spoke to Elder , Christine Vallecillo [phone].  TRACIE informed that Ashley has problems with hoarding. She has had other tenants offer to provide assistance and she does not accept their help.      The windows, doors, and vents are blocked with her  spouse's belongings. TRACIE informed that an EPS report was made last year by [facility]. Christine helped Ashley apply for the CBC program and she didn't comply with them and they signed off and didn't continue to follow.      TRACIE then was transferred by Christine to speak to Annemarie [phone] who is the manager for [facility]. Annemarie informed TRACIE that Ashley has been placed on the \"unit abuse list\"   Ashley was not provided a 30 day notice and is not about to get evicted. However, her hoarding being a fire hazard and non compliance will end up leading in the direction of being served an eviction notice.   If there was a fire, Ashley would not be able to escape through the window and if law enforcement or [employer] were called they would not be able to get inside.     @8667  TRACIE was able to find contact information for [facility] [phone] and spoke to Arjun. TRACIE staffed situation with Arjun and Arjun open to coming to Ashley's apartment to assess and provide a quote to assist with hoarding.     SW to present idea to Ashley and see if she is willing to have Arjun come to her home to assess and provide a quote.   "

## 2022-05-06 NOTE — PROGRESS NOTES
5/6 @0830  TRACIE called Ashley to inform that TRACIE will be stopping by to provide homemaker list, information for [facility] and [place] [employer].   TRACIE will also bring resource for cleaning business and see if Ashley is receptive of resource.     @1000  TRACIE met with Ashley at her apartment. SW discussed legal service resources and homemaker list. Ashley's apartment did appear very cluttered and very limited space to walk. Ashley and TRACIE discussed emergency situations and how her living condition is a hazard. Ashley understands that the hoarding that is blocking her windows and doorways being blocked will be the reason why she doesn't pass her inspection and can potentially be serviced a 30 day eviction notice.     TRACIE informed Ashley of assistance for hoarding through [employer]. TRACIE informed Ashley that they can help her separate her belongings into things that can thrown out, donated, and kept. Ashley was receptive to this idea and having their team assess her home and provide a quote for assistance. Ashley provided SW consent to call [employer] and provide them her information.     Ashley stated that after her  passed in 2016 she feels like she lost control of her life. She does have a hard time getting rid of things. TRACIE provided information for [facility] to get connected to a therapist. Kevinjahaira agreed that a therapist would be helpful.     TRACIE to contact [employer], TRACIE to follow up with Ashley next week      @1328  TRACIE called [employer] and spoke to Evette. TRACIE provided Ashley's contact information. Evette plans to call Ashley this weekend to coordinate a day to go to her home and assess. Evette will call TRACIE back with an update.

## 2022-05-06 NOTE — PROGRESS NOTES
5/6 @1048  TRACIE called [facility] and left a voicemail for an [employer] . TRACIE left a message with name and number requesting a call back.

## 2022-05-09 NOTE — PROGRESS NOTES
5/9 @0904  TRACIE received a call back from [facility] [employer] , Najma. TRACIE able to place report.   TRACIE informed that SW would be notified if/when a report is opened and is assigned a worker.   Intake #- [id number]

## 2022-05-10 NOTE — PROGRESS NOTES
5/10 @1038  TRACIE received a phone call from [facility]PS worker, Mayra ([phone]) informing TRACIE that she has been assigned to [place]. She plans to complete a home visit today.

## 2022-05-12 ENCOUNTER — PATIENT OUTREACH (OUTPATIENT)
Dept: HEALTH INFORMATION MANAGEMENT | Facility: OTHER | Age: 72
End: 2022-05-12
Payer: MEDICARE

## 2022-05-12 RX ORDER — OXYBUTYNIN CHLORIDE 5 MG/1
TABLET ORAL
Qty: 180 TABLET | Refills: 0 | Status: SHIPPED | OUTPATIENT
Start: 2022-05-12 | End: 2023-03-15

## 2022-05-12 RX ORDER — LOTEPREDNOL ETABONATE 5 MG/G
GEL OPHTHALMIC
Status: ON HOLD | COMMUNITY
Start: 2022-05-05 | End: 2022-08-10

## 2022-05-12 RX ORDER — OXYBUTYNIN CHLORIDE 5 MG/1
TABLET ORAL
Qty: 180 TABLET | Refills: 0 | Status: SHIPPED | OUTPATIENT
Start: 2022-05-12 | End: 2022-05-12 | Stop reason: SDUPTHER

## 2022-05-13 ENCOUNTER — PATIENT OUTREACH (OUTPATIENT)
Dept: HEALTH INFORMATION MANAGEMENT | Facility: OTHER | Age: 72
End: 2022-05-13
Payer: MEDICARE

## 2022-05-13 NOTE — PROGRESS NOTES
5/13 @5584  TRACIE called APS workerMayra to follow up on outcome of home visit. Mayra stated that she was able to complete home visit. However, Janeen did not sign a release of information and Mayra can no longer provide further details to TRACIE.     TRACIE informed Mayra that TRACIE has followed up with [facility] and has been in touch with Evette. TRACIE also informed Mayra that Evette has been in touch with Janeen as well. TRACIE provided Evette's contact information to Mayra to assist with Janeen's hoarding and cleaning up.     TRACIE to no longer follow Janeen. APS Mayra humphreys to continue to follow and assist Janeen.

## 2022-05-13 NOTE — PROGRESS NOTES
5/13 @9299  TRACIE called [facility] and spoke to Evette. Evette informed TRACIE that she was able to contact Janeen. They were not able to setup a time and date for Evette to come out and assess to provide a quote for services. Janeen stated to not feel well and would call Evette back.

## 2022-05-17 ENCOUNTER — PATIENT OUTREACH (OUTPATIENT)
Dept: HEALTH INFORMATION MANAGEMENT | Facility: OTHER | Age: 72
End: 2022-05-17
Payer: MEDICARE

## 2022-06-15 RX ORDER — HYDROXYZINE 50 MG/1
50 TABLET, FILM COATED ORAL 3 TIMES DAILY PRN
Qty: 270 TABLET | Refills: 0 | Status: SHIPPED | OUTPATIENT
Start: 2022-06-15 | End: 2022-10-06

## 2022-06-30 ENCOUNTER — APPOINTMENT (OUTPATIENT)
Dept: NEUROLOGY | Facility: MEDICAL CENTER | Age: 72
End: 2022-06-30
Attending: PSYCHIATRY & NEUROLOGY
Payer: MEDICARE

## 2022-07-13 ENCOUNTER — TELEPHONE (OUTPATIENT)
Dept: MEDICAL GROUP | Facility: MEDICAL CENTER | Age: 72
End: 2022-07-13
Payer: MEDICARE

## 2022-07-14 ENCOUNTER — OFFICE VISIT (OUTPATIENT)
Dept: MEDICAL GROUP | Facility: MEDICAL CENTER | Age: 72
End: 2022-07-14
Payer: MEDICARE

## 2022-07-14 ENCOUNTER — HOSPITAL ENCOUNTER (OUTPATIENT)
Dept: LAB | Facility: MEDICAL CENTER | Age: 72
End: 2022-07-14
Attending: FAMILY MEDICINE
Payer: MEDICARE

## 2022-07-14 VITALS
DIASTOLIC BLOOD PRESSURE: 70 MMHG | HEART RATE: 60 BPM | WEIGHT: 119 LBS | OXYGEN SATURATION: 96 % | SYSTOLIC BLOOD PRESSURE: 116 MMHG | TEMPERATURE: 98.9 F | BODY MASS INDEX: 23.36 KG/M2 | HEIGHT: 60 IN

## 2022-07-14 DIAGNOSIS — E78.5 DYSLIPIDEMIA: Chronic | ICD-10-CM

## 2022-07-14 DIAGNOSIS — I10 HTN (HYPERTENSION), BENIGN: ICD-10-CM

## 2022-07-14 DIAGNOSIS — N18.31 STAGE 3A CHRONIC KIDNEY DISEASE: ICD-10-CM

## 2022-07-14 DIAGNOSIS — Z59.819 HOUSING INSTABILITY: ICD-10-CM

## 2022-07-14 DIAGNOSIS — F33.41 RECURRENT MAJOR DEPRESSIVE DISORDER, IN PARTIAL REMISSION (HCC): ICD-10-CM

## 2022-07-14 DIAGNOSIS — R73.02 IGT (IMPAIRED GLUCOSE TOLERANCE): ICD-10-CM

## 2022-07-14 DIAGNOSIS — M51.36 DEGENERATIVE DISC DISEASE, LUMBAR: ICD-10-CM

## 2022-07-14 DIAGNOSIS — F13.20 SEDATIVE, HYPNOTIC, OR ANXIOLYTIC DEPENDENCE (HCC): Chronic | ICD-10-CM

## 2022-07-14 DIAGNOSIS — M79.605 PAIN OF LEFT LOWER EXTREMITY: ICD-10-CM

## 2022-07-14 LAB
ALBUMIN SERPL BCP-MCNC: 4.8 G/DL (ref 3.2–4.9)
ALBUMIN/GLOB SERPL: 1.7 G/DL
ALP SERPL-CCNC: 72 U/L (ref 30–99)
ALT SERPL-CCNC: 19 U/L (ref 2–50)
ANION GAP SERPL CALC-SCNC: 12 MMOL/L (ref 7–16)
AST SERPL-CCNC: 23 U/L (ref 12–45)
BASOPHILS # BLD AUTO: 0.5 % (ref 0–1.8)
BASOPHILS # BLD: 0.03 K/UL (ref 0–0.12)
BILIRUB SERPL-MCNC: 0.7 MG/DL (ref 0.1–1.5)
BUN SERPL-MCNC: 17 MG/DL (ref 8–22)
CALCIUM SERPL-MCNC: 9.4 MG/DL (ref 8.5–10.5)
CHLORIDE SERPL-SCNC: 105 MMOL/L (ref 96–112)
CHOLEST SERPL-MCNC: 153 MG/DL (ref 100–199)
CO2 SERPL-SCNC: 26 MMOL/L (ref 20–33)
CREAT SERPL-MCNC: 0.9 MG/DL (ref 0.5–1.4)
EOSINOPHIL # BLD AUTO: 0.13 K/UL (ref 0–0.51)
EOSINOPHIL NFR BLD: 2.2 % (ref 0–6.9)
ERYTHROCYTE [DISTWIDTH] IN BLOOD BY AUTOMATED COUNT: 43.8 FL (ref 35.9–50)
EST. AVERAGE GLUCOSE BLD GHB EST-MCNC: 117 MG/DL
FASTING STATUS PATIENT QL REPORTED: NORMAL
GFR SERPLBLD CREATININE-BSD FMLA CKD-EPI: 68 ML/MIN/1.73 M 2
GLOBULIN SER CALC-MCNC: 2.9 G/DL (ref 1.9–3.5)
GLUCOSE SERPL-MCNC: 87 MG/DL (ref 65–99)
HBA1C MFR BLD: 5.7 % (ref 4–5.6)
HCT VFR BLD AUTO: 41.9 % (ref 37–47)
HDLC SERPL-MCNC: 58 MG/DL
HGB BLD-MCNC: 13.6 G/DL (ref 12–16)
IMM GRANULOCYTES # BLD AUTO: 0.02 K/UL (ref 0–0.11)
IMM GRANULOCYTES NFR BLD AUTO: 0.3 % (ref 0–0.9)
LDLC SERPL CALC-MCNC: 79 MG/DL
LYMPHOCYTES # BLD AUTO: 2.61 K/UL (ref 1–4.8)
LYMPHOCYTES NFR BLD: 44.2 % (ref 22–41)
MCH RBC QN AUTO: 28.6 PG (ref 27–33)
MCHC RBC AUTO-ENTMCNC: 32.5 G/DL (ref 33.6–35)
MCV RBC AUTO: 88 FL (ref 81.4–97.8)
MONOCYTES # BLD AUTO: 0.6 K/UL (ref 0–0.85)
MONOCYTES NFR BLD AUTO: 10.2 % (ref 0–13.4)
NEUTROPHILS # BLD AUTO: 2.52 K/UL (ref 2–7.15)
NEUTROPHILS NFR BLD: 42.6 % (ref 44–72)
NRBC # BLD AUTO: 0 K/UL
NRBC BLD-RTO: 0 /100 WBC
PLATELET # BLD AUTO: 229 K/UL (ref 164–446)
PMV BLD AUTO: 10.4 FL (ref 9–12.9)
POTASSIUM SERPL-SCNC: 3.7 MMOL/L (ref 3.6–5.5)
PROT SERPL-MCNC: 7.7 G/DL (ref 6–8.2)
RBC # BLD AUTO: 4.76 M/UL (ref 4.2–5.4)
SODIUM SERPL-SCNC: 143 MMOL/L (ref 135–145)
TRIGL SERPL-MCNC: 81 MG/DL (ref 0–149)
TSH SERPL DL<=0.005 MIU/L-ACNC: 2.28 UIU/ML (ref 0.38–5.33)
WBC # BLD AUTO: 5.9 K/UL (ref 4.8–10.8)

## 2022-07-14 PROCEDURE — 36415 COLL VENOUS BLD VENIPUNCTURE: CPT

## 2022-07-14 PROCEDURE — 80061 LIPID PANEL: CPT

## 2022-07-14 PROCEDURE — 84443 ASSAY THYROID STIM HORMONE: CPT

## 2022-07-14 PROCEDURE — 80053 COMPREHEN METABOLIC PANEL: CPT

## 2022-07-14 PROCEDURE — 85025 COMPLETE CBC W/AUTO DIFF WBC: CPT

## 2022-07-14 PROCEDURE — 83036 HEMOGLOBIN GLYCOSYLATED A1C: CPT

## 2022-07-14 PROCEDURE — 99214 OFFICE O/P EST MOD 30 MIN: CPT | Performed by: FAMILY MEDICINE

## 2022-07-14 RX ORDER — TRAZODONE HYDROCHLORIDE 50 MG/1
25 TABLET ORAL NIGHTLY
Qty: 90 TABLET | Refills: 1 | Status: SHIPPED | OUTPATIENT
Start: 2022-07-14 | End: 2023-10-23

## 2022-07-14 RX ORDER — PREDNISOLONE ACETATE 10 MG/ML
1 SUSPENSION/ DROPS OPHTHALMIC EVERY EVENING
Status: ON HOLD | COMMUNITY
Start: 2022-05-11 | End: 2022-08-19

## 2022-07-14 RX ORDER — MOXIFLOXACIN 5 MG/ML
SOLUTION/ DROPS OPHTHALMIC
Status: ON HOLD | COMMUNITY
Start: 2022-05-11 | End: 2022-08-10

## 2022-07-14 RX ORDER — VALSARTAN 40 MG/1
40 TABLET ORAL DAILY
Qty: 100 TABLET | Refills: 3 | Status: SHIPPED | OUTPATIENT
Start: 2022-07-14 | End: 2023-01-03 | Stop reason: SDUPTHER

## 2022-07-14 SDOH — ECONOMIC STABILITY - HOUSING INSECURITY: HOUSING INSTABILITY UNSPECIFIED: Z59.819

## 2022-07-14 NOTE — PROGRESS NOTES
"Subjective:     CC: \"Leg pain\"    HPI:   Janeen Ramirez presents today with:    HTN:  - no light, dizzy, falls  - no chest pain, no new sob  -Tolerating medicine well    Leg pain:  - having b/l leg pain, mostly on left  - chronic but worsened about 2 months ago  - burning pain can be sharp (not pins and needles), mostly in calf, radiates up and down leg, stays below knee, no foot pain  - no rashes, no known trauma  - taking baclofen, Celebrex, tylenol arthritis, CBD gummy with minimal effect    Problem   Housing Instability    Patient's been on the same place for many years.  She feels bullied by current management.  She feels like they are not keeping up with their end of the DL and forcing her to make a lot of changes under threat of eviction     Sedative, Hypnotic, Or Anxiolytic Dependence (Hcc)   Dyslipidemia   Htn (Hypertension), Benign    4/12/2022: 144/68  7/14/2022: 116/70  Valsartan 40 mg         Current Outpatient Medications Ordered in Epic   Medication Sig Dispense Refill   • moxifloxacin (VIGAMOX) 0.5 % Solution      • prednisoLONE acetate (PRED FORTE) 1 % Suspension      • valsartan (DIOVAN) 40 MG Tab Take 1 Tablet by mouth every day. 100 Tablet 3   • diclofenac sodium (VOLTAREN) 1 % Gel Apply 2 g topically 2 times a day as needed (leg pain). 50 g 1   • traZODone (DESYREL) 50 MG Tab Take 0.5 Tablets by mouth every evening. per 90 Tablet 1   • hydrOXYzine HCl (ATARAX) 50 MG Tab Take 1 Tablet by mouth 3 times a day as needed for Anxiety. 270 Tablet 0   • oxybutynin (DITROPAN) 5 MG Tab TAKE ONE TABLET BY MOUTH TWICE DAILY 180 Tablet 0   • Loteprednol Etabonate 0.5 % Gel      • baclofen (LIORESAL) 10 MG Tab Take 1 Tablet by mouth every day. 100 Tablet 1   • estradiol (ESTRACE) 0.5 MG tablet Take 1 Tablet by mouth every day. 90 Tablet 3   • omeprazole (PRILOSEC) 40 MG delayed-release capsule TAKE ONE CAPSULE BY MOUTH ONE TIME DAILY 90 Capsule 3   • celecoxib (CELEBREX) 200 MG Cap TAKE ONE CAPSULE BY MOUTH " "ONE TIME DAILY 100 Capsule 3   • fluocinonide (LIDEX) 0.05 % Cream AAA posterior scalp, neck, back, BID PRN Itching. Avoid use on face, axilla, groin. 60 g 1   • Rimegepant Sulfate 75 MG TABLET DISPERSIBLE Take 1 Tablet by mouth 1 time a day as needed for up to 16 doses. 30 Tablet 12   • atorvastatin (LIPITOR) 80 MG tablet TAKE 1 TABLET BY MOUTH ONCE DAILY IN THE EVENING 100 Tablet 2   • Biotin 5000 MCG Cap Take  by mouth.     • Krill Oil (OMEGA-3) 500 MG Cap Take  by mouth.     • Flaxseed, Linseed, (FLAXSEED OIL PO) Take  by mouth.     • albuterol 108 (90 Base) MCG/ACT Aero Soln inhalation aerosol Inhale 2 Puffs by mouth every 6 hours as needed. 8.5 g 11   • sertraline (ZOLOFT) 100 MG Tab Take 1 Tab by mouth every day. 90 Tab 3     No current Epic-ordered facility-administered medications on file.       Health Maintenance: No vaccines today    ROS:  ROS see HPI    Objective:     Exam:  /70   Pulse 60   Temp 37.2 °C (98.9 °F) (Temporal)   Ht 1.511 m (4' 11.5\")   Wt 54 kg (119 lb)   SpO2 96%   BMI 23.63 kg/m²  Body mass index is 23.63 kg/m².    Physical Exam  Vitals reviewed.   Constitutional:       General: She is not in acute distress.     Appearance: Normal appearance.   HENT:      Head: Normocephalic and atraumatic.   Cardiovascular:      Rate and Rhythm: Normal rate and regular rhythm.      Pulses: Normal pulses.      Heart sounds: Normal heart sounds.   Pulmonary:      Effort: Pulmonary effort is normal.      Breath sounds: Normal breath sounds.   Musculoskeletal:         General: No swelling, tenderness, deformity or signs of injury.      Right lower leg: No edema.      Left lower leg: No edema.   Skin:     General: Skin is warm and dry.      Capillary Refill: Capillary refill takes less than 2 seconds.      Findings: No bruising or erythema.   Neurological:      Mental Status: She is alert. Mental status is at baseline.   Psychiatric:         Mood and Affect: Mood normal.         Behavior: " Behavior normal.           Assessment & Plan:     71 y.o. female with the following -     Problem List Items Addressed This Visit     Dyslipidemia (Chronic)     Patient's blood panels been well controlled.  While I do not think her leg pain is necessarily attributable to her atorvastatin we will back off to go to 40 mg nightly instead of 80.           HTN (hypertension), benign (Chronic)     This is a chronic condition, controlled.  Blood pressure is at goal under 140/90 in the office today.  We will continue the current regimen.             Relevant Medications    valsartan (DIOVAN) 40 MG Tab    Sedative, hypnotic, or anxiolytic dependence (HCC) (Chronic)     Patient stable on trazodone 25 mg nightly.  No difficulty with waking up and no concerning side effects.  Will provide refills.           Degenerative disc disease, lumbar    Relevant Orders    Referral to Physical Therapy    Housing instability     Social work has been very helpful with this.  She feels more safe about her housing now than previous.             Other Visit Diagnoses     Pain of left lower extremity      No palpable findings on physical exam, no signs of infection or clot.  Burning pain could suggest nerve pain, she tells me she does not tolerate gabapentin well.  We will do a trial of physical therapy and as needed Voltaren gel.    Relevant Orders    Referral to Physical Therapy              Return in about 3 months (around 10/14/2022).    Please note that this dictation was created using voice recognition software. I have made every reasonable attempt to correct obvious errors, but I expect that there are errors of grammar and possibly content that I did not discover before finalizing the note.

## 2022-07-14 NOTE — PATIENT INSTRUCTIONS
Take 1/2 tablet of atorvastatin (Lipitor) nightly instead of a whole tablet    Trial of Voltaren gel twice a day as needed for leg pain  Trial of Physical Therapy

## 2022-07-14 NOTE — ASSESSMENT & PLAN NOTE
This is a chronic condition, controlled.  Blood pressure is at goal under 140/90 in the office today.  We will continue the current regimen.

## 2022-07-14 NOTE — ASSESSMENT & PLAN NOTE
Patient stable on trazodone 25 mg nightly.  No difficulty with waking up and no concerning side effects.  Will provide refills.

## 2022-07-14 NOTE — ASSESSMENT & PLAN NOTE
Patient's blood panels been well controlled.  While I do not think her leg pain is necessarily attributable to her atorvastatin we will back off to go to 40 mg nightly instead of 80.

## 2022-07-14 NOTE — ASSESSMENT & PLAN NOTE
Social work has been very helpful with this.  She feels more safe about her housing now than previous.

## 2022-08-09 ENCOUNTER — APPOINTMENT (OUTPATIENT)
Dept: ADMISSIONS | Facility: MEDICAL CENTER | Age: 72
End: 2022-08-09
Attending: COLON & RECTAL SURGERY
Payer: MEDICARE

## 2022-08-09 RX ORDER — ZINC OXIDE 13 %
1 CREAM (GRAM) TOPICAL EVERY EVENING
COMMUNITY

## 2022-08-09 RX ORDER — VIT A/VIT C/VIT E/ZINC/COPPER 2148-113
1 TABLET ORAL
COMMUNITY

## 2022-08-10 ENCOUNTER — HOSPITAL ENCOUNTER (OUTPATIENT)
Facility: MEDICAL CENTER | Age: 72
End: 2022-08-10
Attending: COLON & RECTAL SURGERY | Admitting: COLON & RECTAL SURGERY
Payer: MEDICARE

## 2022-08-10 ENCOUNTER — ANESTHESIA EVENT (OUTPATIENT)
Dept: SURGERY | Facility: MEDICAL CENTER | Age: 72
End: 2022-08-10
Payer: MEDICARE

## 2022-08-10 ENCOUNTER — ANESTHESIA (OUTPATIENT)
Dept: SURGERY | Facility: MEDICAL CENTER | Age: 72
End: 2022-08-10
Payer: MEDICARE

## 2022-08-10 ENCOUNTER — APPOINTMENT (OUTPATIENT)
Dept: RADIOLOGY | Facility: MEDICAL CENTER | Age: 72
End: 2022-08-10
Attending: COLON & RECTAL SURGERY
Payer: MEDICARE

## 2022-08-10 VITALS
DIASTOLIC BLOOD PRESSURE: 62 MMHG | TEMPERATURE: 97.2 F | HEIGHT: 61 IN | HEART RATE: 74 BPM | BODY MASS INDEX: 22.48 KG/M2 | OXYGEN SATURATION: 97 % | WEIGHT: 119.05 LBS | SYSTOLIC BLOOD PRESSURE: 144 MMHG | RESPIRATION RATE: 16 BRPM

## 2022-08-10 LAB — EKG IMPRESSION: NORMAL

## 2022-08-10 PROCEDURE — C1767 GENERATOR, NEURO NON-RECHARG: HCPCS | Performed by: COLON & RECTAL SURGERY

## 2022-08-10 PROCEDURE — 502240 HCHG MISC OR SUPPLY RC 0272: Performed by: COLON & RECTAL SURGERY

## 2022-08-10 PROCEDURE — 160002 HCHG RECOVERY MINUTES (STAT): Performed by: COLON & RECTAL SURGERY

## 2022-08-10 PROCEDURE — 700101 HCHG RX REV CODE 250: Performed by: COLON & RECTAL SURGERY

## 2022-08-10 PROCEDURE — 160009 HCHG ANES TIME/MIN: Performed by: COLON & RECTAL SURGERY

## 2022-08-10 PROCEDURE — C1778 LEAD, NEUROSTIMULATOR: HCPCS | Performed by: COLON & RECTAL SURGERY

## 2022-08-10 PROCEDURE — 93005 ELECTROCARDIOGRAM TRACING: CPT | Performed by: COLON & RECTAL SURGERY

## 2022-08-10 PROCEDURE — 99100 ANES PT EXTEME AGE<1 YR&>70: CPT | Performed by: ANESTHESIOLOGY

## 2022-08-10 PROCEDURE — 160035 HCHG PACU - 1ST 60 MINS PHASE I: Performed by: COLON & RECTAL SURGERY

## 2022-08-10 PROCEDURE — 00300 ANES ALL PX INTEG H/N/PTRUNK: CPT | Performed by: ANESTHESIOLOGY

## 2022-08-10 PROCEDURE — 160046 HCHG PACU - 1ST 60 MINS PHASE II: Performed by: COLON & RECTAL SURGERY

## 2022-08-10 PROCEDURE — 502000 HCHG MISC OR IMPLANTS RC 0278: Performed by: COLON & RECTAL SURGERY

## 2022-08-10 PROCEDURE — 160025 RECOVERY II MINUTES (STATS): Performed by: COLON & RECTAL SURGERY

## 2022-08-10 PROCEDURE — 160028 HCHG SURGERY MINUTES - 1ST 30 MINS LEVEL 3: Performed by: COLON & RECTAL SURGERY

## 2022-08-10 PROCEDURE — 700105 HCHG RX REV CODE 258: Performed by: ANESTHESIOLOGY

## 2022-08-10 PROCEDURE — 160048 HCHG OR STATISTICAL LEVEL 1-5: Performed by: COLON & RECTAL SURGERY

## 2022-08-10 PROCEDURE — 700111 HCHG RX REV CODE 636 W/ 250 OVERRIDE (IP): Performed by: ANESTHESIOLOGY

## 2022-08-10 PROCEDURE — 93010 ELECTROCARDIOGRAM REPORT: CPT | Performed by: INTERNAL MEDICINE

## 2022-08-10 PROCEDURE — C1897 LEAD, NEUROSTIM TEST KIT: HCPCS | Performed by: COLON & RECTAL SURGERY

## 2022-08-10 DEVICE — IMPLANTABLE DEVICE: Type: IMPLANTABLE DEVICE | Site: BACK | Status: FUNCTIONAL

## 2022-08-10 RX ORDER — BUPIVACAINE HYDROCHLORIDE AND EPINEPHRINE 5; 5 MG/ML; UG/ML
INJECTION, SOLUTION EPIDURAL; INTRACAUDAL; PERINEURAL
Status: DISCONTINUED | OUTPATIENT
Start: 2022-08-10 | End: 2022-08-10 | Stop reason: HOSPADM

## 2022-08-10 RX ORDER — OXYCODONE HCL 5 MG/5 ML
10 SOLUTION, ORAL ORAL
Status: DISCONTINUED | OUTPATIENT
Start: 2022-08-10 | End: 2022-08-10 | Stop reason: HOSPADM

## 2022-08-10 RX ORDER — SODIUM CHLORIDE, SODIUM LACTATE, POTASSIUM CHLORIDE, CALCIUM CHLORIDE 600; 310; 30; 20 MG/100ML; MG/100ML; MG/100ML; MG/100ML
INJECTION, SOLUTION INTRAVENOUS CONTINUOUS
Status: DISCONTINUED | OUTPATIENT
Start: 2022-08-10 | End: 2022-08-10 | Stop reason: HOSPADM

## 2022-08-10 RX ORDER — LIDOCAINE HYDROCHLORIDE AND EPINEPHRINE 10; 10 MG/ML; UG/ML
INJECTION, SOLUTION INFILTRATION; PERINEURAL
Status: DISCONTINUED | OUTPATIENT
Start: 2022-08-10 | End: 2022-08-10 | Stop reason: HOSPADM

## 2022-08-10 RX ORDER — MIDAZOLAM HYDROCHLORIDE 1 MG/ML
INJECTION INTRAMUSCULAR; INTRAVENOUS PRN
Status: DISCONTINUED | OUTPATIENT
Start: 2022-08-10 | End: 2022-08-10 | Stop reason: SURG

## 2022-08-10 RX ORDER — HALOPERIDOL 5 MG/ML
1 INJECTION INTRAMUSCULAR
Status: DISCONTINUED | OUTPATIENT
Start: 2022-08-10 | End: 2022-08-10 | Stop reason: HOSPADM

## 2022-08-10 RX ORDER — CEFAZOLIN SODIUM 1 G/3ML
INJECTION, POWDER, FOR SOLUTION INTRAMUSCULAR; INTRAVENOUS PRN
Status: DISCONTINUED | OUTPATIENT
Start: 2022-08-10 | End: 2022-08-10 | Stop reason: SURG

## 2022-08-10 RX ORDER — MEPERIDINE HYDROCHLORIDE 25 MG/ML
12.5 INJECTION INTRAMUSCULAR; INTRAVENOUS; SUBCUTANEOUS
Status: DISCONTINUED | OUTPATIENT
Start: 2022-08-10 | End: 2022-08-10 | Stop reason: HOSPADM

## 2022-08-10 RX ORDER — OXYCODONE HCL 5 MG/5 ML
5 SOLUTION, ORAL ORAL
Status: DISCONTINUED | OUTPATIENT
Start: 2022-08-10 | End: 2022-08-10 | Stop reason: HOSPADM

## 2022-08-10 RX ORDER — ONDANSETRON 2 MG/ML
4 INJECTION INTRAMUSCULAR; INTRAVENOUS
Status: DISCONTINUED | OUTPATIENT
Start: 2022-08-10 | End: 2022-08-10 | Stop reason: HOSPADM

## 2022-08-10 RX ORDER — SODIUM CHLORIDE, SODIUM LACTATE, POTASSIUM CHLORIDE, CALCIUM CHLORIDE 600; 310; 30; 20 MG/100ML; MG/100ML; MG/100ML; MG/100ML
INJECTION, SOLUTION INTRAVENOUS
Status: DISCONTINUED | OUTPATIENT
Start: 2022-08-10 | End: 2022-08-10 | Stop reason: SURG

## 2022-08-10 RX ORDER — DIPHENHYDRAMINE HYDROCHLORIDE 50 MG/ML
12.5 INJECTION INTRAMUSCULAR; INTRAVENOUS
Status: DISCONTINUED | OUTPATIENT
Start: 2022-08-10 | End: 2022-08-10 | Stop reason: HOSPADM

## 2022-08-10 RX ORDER — SODIUM CHLORIDE, SODIUM LACTATE, POTASSIUM CHLORIDE, CALCIUM CHLORIDE 600; 310; 30; 20 MG/100ML; MG/100ML; MG/100ML; MG/100ML
INJECTION, SOLUTION INTRAVENOUS CONTINUOUS
Status: DISCONTINUED | OUTPATIENT
Start: 2022-08-10 | End: 2022-08-10

## 2022-08-10 RX ADMIN — PROPOFOL 50 MG: 10 INJECTION, EMULSION INTRAVENOUS at 12:26

## 2022-08-10 RX ADMIN — MIDAZOLAM HYDROCHLORIDE 2 MG: 1 INJECTION, SOLUTION INTRAMUSCULAR; INTRAVENOUS at 12:26

## 2022-08-10 RX ADMIN — CEFAZOLIN 2 G: 330 INJECTION, POWDER, FOR SOLUTION INTRAMUSCULAR; INTRAVENOUS at 12:28

## 2022-08-10 RX ADMIN — SODIUM CHLORIDE, POTASSIUM CHLORIDE, SODIUM LACTATE AND CALCIUM CHLORIDE: 600; 310; 30; 20 INJECTION, SOLUTION INTRAVENOUS at 12:11

## 2022-08-10 RX ADMIN — FENTANYL CITRATE 50 MCG: 50 INJECTION, SOLUTION INTRAMUSCULAR; INTRAVENOUS at 12:26

## 2022-08-10 ASSESSMENT — FIBROSIS 4 INDEX: FIB4 SCORE: 1.64

## 2022-08-10 ASSESSMENT — PAIN DESCRIPTION - PAIN TYPE: TYPE: SURGICAL PAIN

## 2022-08-10 NOTE — ANESTHESIA POSTPROCEDURE EVALUATION
Patient: Janeen Ritter    Procedure Summary     Date: 08/10/22 Room / Location: [place] [address] / SURGERY [facility]    Anesthesia Start: 1224 Anesthesia Stop: 1254    Procedure: SACRAL NEUROMODULATION TRIAL (Back) Diagnosis: (URINARY INCONTINENCE)    Surgeons: Holger Farris M.D. Responsible Provider: Tobey Gansert, M.D.    Anesthesia Type: MAC ASA Status: 3          Final Anesthesia Type: MAC  Last vitals  BP   Blood Pressure : 132/64    Temp   36.1 °C (97 °F)    Pulse   72   Resp   16    SpO2   94 %      Anesthesia Post Evaluation    Patient location during evaluation: PACU  Patient participation: complete - patient participated  Level of consciousness: awake and alert    Airway patency: patent  Anesthetic complications: no  Cardiovascular status: hemodynamically stable  Respiratory status: acceptable  Hydration status: euvolemic    PONV: none          No notable events documented.     Nurse Pain Score: 0 (NPRS)

## 2022-08-10 NOTE — OR NURSING
Patient arrived to phase 2 without complaints of pain, nausea, or vomiting. A&Ox4. Vitals stable. Patient able to transfer steadily without assistance. Back external stimulator visible through tegaderm dressing. Surgical site is clean, dry, and intact without visible drainage.     Patient voided x1. Able to walk without assistance. Personal belongings returned, patient changed into clothes from home without difficulties. Discharge instructions given. Pain medication education given. Questions answered. PIV removed, bleeding controlled, dressing placed. No changes to surgical site form initial assessment.     Patient discharged via wheelchair to vehicle with all belongings in hand.

## 2022-08-10 NOTE — ANESTHESIA PREPROCEDURE EVALUATION
Case: [id number] Date/Time: 08/10/22 1200    Procedure: SACRAL NEUROMODULATION TRIAL    Pre-op diagnosis: URINARY INCONTINENCE    Location: [facility] OR  / SURGERY [facility]    Surgeons: Holger Farris M.D.          Relevant Problems   PULMONARY   (positive) Asthma in adult   (positive) Mucopurulent chronic bronchitis (HCC)      NEURO   (positive) Other headache syndrome      CARDIAC   (positive) HTN (hypertension), benign   (positive) Hot flashes      GI   (positive) GERD (gastroesophageal reflux disease)         (positive) Chronic kidney disease (CKD) stage G3a/A1, moderately decreased glomerular filtration rate (GFR) between 45-59 mL/min/1.73 square meter and albuminuria creatinine ratio less than 30 mg/g (HCC)       Physical Exam    Airway   Mallampati: II  TM distance: >3 FB  Neck ROM: full       Cardiovascular - normal exam  Rhythm: regular  Rate: normal  (-) murmur     Dental - normal exam           Pulmonary - normal exam  Breath sounds clear to auscultation     Abdominal    Neurological - normal exam                 Anesthesia Plan    ASA 3   ASA physical status 3 criteria: COPD    Plan - MAC               Induction: intravenous    Postoperative Plan: Postoperative administration of opioids is intended.    Pertinent diagnostic labs and testing reviewed    Informed Consent:    Anesthetic plan and risks discussed with patient.

## 2022-08-10 NOTE — OP REPORT
NAME:  Janeen Ritter  MRN:  [id number]  :  1950    DATE OF OPERATION: 8/10/2022    PREOPERATIVE DIAGNOSIS:  Refractory Urinary Incontinence    POSTOPERATIVE DIAGNOSES:  Refractory Urinary Incontinence    SURGEON: Holger Farris MD, FACS, FACRS    ASSISTANT:  Tiff Tabor PA-C, SILVANA    ANESTHESIOLOGIST:  Anesthesiologist: Tobey Gansert, M.D.    ANESTHESIA: conscious sedation     OPERATION PERFORMED:  1. Right and Left S3 foramen needle placement with neurostimulation and testing  2. Intraoperative flouroscopy with interpretation.  3. Placement of trial S3 leads with connection and programming of [employer] trial generator device.    ESTIMATED BLOOD LOSS: <5cc.     INDICATIONS FOR PROCEDURE: The patient is a 72 y.o. female with a history of Refractory Urinary Incontinence and multiple attempts and prior measures and treatments taken without adequate relief. She is taken to the operating room today for sacral neuromodulation trial system implantation .     Detail of Procedure: After an extensive informed consent discussion and process, the patient was brought to the operating room. She was placed in a prone position on the operating table. After induction of conscious sedation, pillows were placed under the lower abdomen and under the shins to flatten the sacrum and allow the toes to dangle freely.  The patient was prepped and draped in the usual sterile fashion.     After administration of intravenous antibiotics, a local anesthetic mixture of bupivacaine with epinephrine was used to infiltrate the skin sites.  The C-arm was then draped and moved into AP position to provide fluoroscopic mapping of the sacral region which included marking out midline of the sacrum, SI joints, sciatic notches, medial foramenal borders and sacral foramena.  The C-arm was moved to the lateral position to image the area from sacral promontory to the coccyx.     The foramen needle was introduced approximately 2 cm above the  "sciatic notch and 2 cm lateral to the sacral midline, feeling for foramenal margins until the S3 foramen was identified and penetrated.  The depth of the foramen needle was confirmed and adjusted fluoroscopically.  Proper needle position was confirmed by identification of location and sensation, direct observation of the lifting of the perineum or \"bellowing\", and observation of plantar flexion of the great toe utilizing the external test stimulator.    The foramen needle stylet was removed and a trial lead was then placed.  The position was then checked fluoroscopically. The process was repeated for the contralateral lead placement. Each electrode was tested for location and patient sensation, visualization of \"salvatore\", and plantar flexion of the great toe.    The neurostimulator was connected and impedances were confirmed to be within normal limits, greater than 50 and less than 4,000. Steristrips and guaze 4x4's were placed over the incisions.     The patient tolerated the procedure well and there were no apparent complications. All sponge, needle, and instrument counts were correct on 2 separate occasions. She was awakened and transferred to the recovery room in satisfactory condition. Using the clinician , the [employer] trial generator was programmed for the trial:  pulse frequency rate, pulse amplitude, pulse duration, cycling, stimulation train duration, train spacing, number of programs and alternating electrode polarities.  The final electrode selections were set.    The total time spent performing programming was approximately 15 min.  The patient was given instructions on utilizing the patient  prior to discharge.           ____________________________________   Holger Farris MD  DD: 8/10/2022  12:55 PM    CC:  [facility];    "

## 2022-08-10 NOTE — OR NURSING
1253 Pt arrived to PACU with Anesthesiologist and OR RN. AAOx4. Even, unlabored respirations. VSS. Denies pain. Denies nausea. Lower back dressing with external device CDI. Patient laying on left side to prevent direct pressure on external device.     1330 POC update given to Gilmer, friend/ride, over the phone. All questions answered.     1345 Pt meets phase II criteria.      1355 Report called to SHIVANI Perez. Pt transported to pre op 25 with RN. Chart and device rep supply box with patient.

## 2022-08-10 NOTE — DISCHARGE INSTRUCTIONS
HOME CARE INSTRUCTIONS    ACTIVITY: Rest and take it easy for the first 24 hours.  A responsible adult is recommended to remain with you during that time.  It is normal to feel sleepy.  We encourage you to not do anything that requires balance, judgment or coordination.    FOR 24 HOURS DO NOT:  Drive, operate machinery or run household appliances.  Drink beer or alcoholic beverages.  Make important decisions or sign legal documents.    SPECIAL INSTRUCTIONS: Sacral Neuromodulation Trial D/C instructions:    1. DIET: Upon discharge from the hospital you may resume your normal preoperative diet. Depending on how you are feeling and whether you have nausea or not, you may wish to stay with a bland diet for the first few days. However, you can advance this as quickly as you feel ready.    2. ACTIVITIES: After discharge from the hospital, you may resume full routine activities. However, there should be no strenuous activities until after your follow-up visit. Otherwise, routine activities of daily living are acceptable.    3. DRIVING: You may drive whenever you are able to perform the activities needed to drive, i.e. turning, bending, twisting, etc.    4. BATHING: You should not get the wound or dressing wet after leaving the hospital. Sponge baths only please until your follow-up appointment in the office.      5. BOWEL FUNCTION: Constipation is common after a procedure, especially with pain medications. The combination of pain medication and decreased activity level can cause constipation in otherwise normal patients. If you feel this is occurring, take a laxative (Miralax, Milk of Magnesia, Ex-Lax, Senokot, etc.) until the problem has resolved.    6. PAIN MEDICATION: You can take Tylenol or Ibuprofen for any discomfort or pain. Tylenol 650mg every 6 hours, not to exceed 4,000mg in a 24 hour period.  It is important to remember not to take medications on an empty stomach as this may cause nausea.    7.CALL IF YOU HAVE:  (1) Fevers to more than 101.0 F, (2) Unusual chest or leg pain, (3) Drainage or fluid from wire insertion sites that may be foul smelling, increased tenderness or soreness at the wound or the wound edges are no longer together, redness or swelling at the wire insertion sites. Please do not hesitate to call with any other questions.     8. APPOINTMENT: Contact our office at [phone] for a follow-up appointment in the office in 1 week following your procedure for removal of the test wires, unless it have been prescheduled.    If you have any additional questions, please do not hesitate to call the office and speak to either myself or the physician on call.    Office address:  [facility].  [address] [place] [address] [address] [place] [id number]      DIET: To avoid nausea, slowly advance diet as tolerated, avoiding spicy or greasy foods for the first day.  Add more substantial food to your diet according to your physician's instructions.  Babies can be fed formula or breast milk as soon as they are hungry.  INCREASE FLUIDS AND FIBER TO AVOID CONSTIPATION.    SURGICAL DRESSING/BATHING: You should not get the wound or dressing wet after leaving the hospital. Sponge baths only please until your follow-up appointment in the office.      MEDICATIONS: Resume taking daily medication.  Take prescribed pain medication with food.  If no medication is prescribed, you may take non-aspirin pain medication if needed.  PAIN MEDICATION CAN BE VERY CONSTIPATING.  Take a stool softener or laxative such as senokot, pericolace, or milk of magnesia if needed.    Prescription given for ______.  Last pain medication given at ______.    A follow-up appointment should be arranged with your doctor in 1 week; call to schedule.    You should CALL YOUR PHYSICIAN if you develop:  Fever greater than 101 degrees F.  Pain not relieved by medication, or persistent nausea or vomiting.  Excessive bleeding (blood soaking through dressing) or  unexpected drainage from the wound.  Extreme redness or swelling around the incision site, drainage of pus or foul smelling drainage.  Inability to urinate or empty your bladder within 8 hours.  Problems with breathing or chest pain.    You should call 911 if you develop problems with breathing or chest pain.  If you are unable to contact your doctor or surgical center, you should go to the nearest emergency room or urgent care center.  Physician's telephone #:   [phone]       MILD FLU-LIKE SYMPTOMS ARE NORMAL.  YOU MAY EXPERIENCE GENERALIZED MUSCLE ACHES, THROAT IRRITATION, HEADACHE AND/OR SOME NAUSEA.    If any questions arise, call your doctor.  If your doctor is not available, please feel free to call the Surgical Center at [phone].  The Center is open Monday through Friday from 7AM to 7PM.      A registered nurse may call you a few days after your surgery to see how you are doing after your procedure.    You may also receive a survey in the mail within the next two weeks and we ask that you take a few moments to complete the survey and return it to us.  Our goal is to provide you with very good care and we value your comments.     Depression / Suicide Risk    As you are discharged from this [facility] facility, it is important to learn how to keep safe from harming yourself.    Recognize the warning signs:  Abrupt changes in personality, positive or negative- including increase in energy   Giving away possessions  Change in eating patterns- significant weight changes-  positive or negative  Change in sleeping patterns- unable to sleep or sleeping all the time   Unwillingness or inability to communicate  Depression  Unusual sadness, discouragement and loneliness  Talk of wanting to die  Neglect of personal appearance   Rebelliousness- reckless behavior  Withdrawal from people/activities they love  Confusion- inability to concentrate     If you or a loved one observes any of these behaviors or has  concerns about self-harm, here's what you can do:  Talk about it- your feelings and reasons for harming yourself  Remove any means that you might use to hurt yourself (examples: pills, rope, extension cords, firearm)  Get professional help from the community (Mental Health, Substance Abuse, psychological counseling)  Do not be alone:Call your Safe Contact- someone whom you trust who will be there for you.  Call your local CRISIS HOTLINE 813-5468 or 659-903-7960  Call your local Children's Mobile Crisis Response Team [place] (556) 957-9968 or www.[employer]  Call the toll free National Suicide Prevention Hotlines   National Suicide Prevention Lifeline 567-543-MJZB (0582)  National Hope Line Network 800-SUICIDE (016-2755)    I acknowledge receipt and understanding of these Home Care instructions.

## 2022-08-18 ENCOUNTER — APPOINTMENT (OUTPATIENT)
Dept: ADMISSIONS | Facility: MEDICAL CENTER | Age: 72
End: 2022-08-18
Attending: COLON & RECTAL SURGERY
Payer: MEDICARE

## 2022-08-19 ENCOUNTER — APPOINTMENT (OUTPATIENT)
Dept: RADIOLOGY | Facility: MEDICAL CENTER | Age: 72
End: 2022-08-19
Attending: COLON & RECTAL SURGERY
Payer: MEDICARE

## 2022-08-19 ENCOUNTER — HOSPITAL ENCOUNTER (OUTPATIENT)
Facility: MEDICAL CENTER | Age: 72
End: 2022-08-19
Attending: COLON & RECTAL SURGERY | Admitting: COLON & RECTAL SURGERY
Payer: MEDICARE

## 2022-08-19 ENCOUNTER — ANESTHESIA (OUTPATIENT)
Dept: SURGERY | Facility: MEDICAL CENTER | Age: 72
End: 2022-08-19
Payer: MEDICARE

## 2022-08-19 ENCOUNTER — ANESTHESIA EVENT (OUTPATIENT)
Dept: SURGERY | Facility: MEDICAL CENTER | Age: 72
End: 2022-08-19
Payer: MEDICARE

## 2022-08-19 VITALS
SYSTOLIC BLOOD PRESSURE: 161 MMHG | BODY MASS INDEX: 22.6 KG/M2 | WEIGHT: 119.71 LBS | DIASTOLIC BLOOD PRESSURE: 68 MMHG | HEIGHT: 61 IN | OXYGEN SATURATION: 94 % | TEMPERATURE: 97.9 F | RESPIRATION RATE: 17 BRPM | HEART RATE: 63 BPM

## 2022-08-19 DIAGNOSIS — G89.18 POST-OP PAIN: ICD-10-CM

## 2022-08-19 PROCEDURE — C1897 LEAD, NEUROSTIM TEST KIT: HCPCS | Performed by: COLON & RECTAL SURGERY

## 2022-08-19 PROCEDURE — 160035 HCHG PACU - 1ST 60 MINS PHASE I: Performed by: COLON & RECTAL SURGERY

## 2022-08-19 PROCEDURE — 160009 HCHG ANES TIME/MIN: Performed by: COLON & RECTAL SURGERY

## 2022-08-19 PROCEDURE — 160039 HCHG SURGERY MINUTES - EA ADDL 1 MIN LEVEL 3: Performed by: COLON & RECTAL SURGERY

## 2022-08-19 PROCEDURE — 160046 HCHG PACU - 1ST 60 MINS PHASE II: Performed by: COLON & RECTAL SURGERY

## 2022-08-19 PROCEDURE — 99100 ANES PT EXTEME AGE<1 YR&>70: CPT | Performed by: ANESTHESIOLOGY

## 2022-08-19 PROCEDURE — 700111 HCHG RX REV CODE 636 W/ 250 OVERRIDE (IP): Performed by: ANESTHESIOLOGY

## 2022-08-19 PROCEDURE — 160002 HCHG RECOVERY MINUTES (STAT): Performed by: COLON & RECTAL SURGERY

## 2022-08-19 PROCEDURE — 700105 HCHG RX REV CODE 258: Performed by: COLON & RECTAL SURGERY

## 2022-08-19 PROCEDURE — 502240 HCHG MISC OR SUPPLY RC 0272: Performed by: COLON & RECTAL SURGERY

## 2022-08-19 PROCEDURE — C1767 GENERATOR, NEURO NON-RECHARG: HCPCS | Performed by: COLON & RECTAL SURGERY

## 2022-08-19 PROCEDURE — 502000 HCHG MISC OR IMPLANTS RC 0278: Performed by: COLON & RECTAL SURGERY

## 2022-08-19 PROCEDURE — 700101 HCHG RX REV CODE 250: Performed by: COLON & RECTAL SURGERY

## 2022-08-19 PROCEDURE — 160048 HCHG OR STATISTICAL LEVEL 1-5: Performed by: COLON & RECTAL SURGERY

## 2022-08-19 PROCEDURE — 00300 ANES ALL PX INTEG H/N/PTRUNK: CPT | Performed by: ANESTHESIOLOGY

## 2022-08-19 PROCEDURE — C1778 LEAD, NEUROSTIMULATOR: HCPCS | Performed by: COLON & RECTAL SURGERY

## 2022-08-19 PROCEDURE — 160025 RECOVERY II MINUTES (STATS): Performed by: COLON & RECTAL SURGERY

## 2022-08-19 PROCEDURE — 160028 HCHG SURGERY MINUTES - 1ST 30 MINS LEVEL 3: Performed by: COLON & RECTAL SURGERY

## 2022-08-19 PROCEDURE — C1787 PATIENT PROGR, NEUROSTIM: HCPCS | Performed by: COLON & RECTAL SURGERY

## 2022-08-19 DEVICE — IMPLANTABLE DEVICE: Type: IMPLANTABLE DEVICE | Site: BACK | Status: FUNCTIONAL

## 2022-08-19 RX ORDER — OXYCODONE HCL 5 MG/5 ML
5 SOLUTION, ORAL ORAL
Status: DISCONTINUED | OUTPATIENT
Start: 2022-08-19 | End: 2022-08-19 | Stop reason: HOSPADM

## 2022-08-19 RX ORDER — ONDANSETRON 2 MG/ML
4 INJECTION INTRAMUSCULAR; INTRAVENOUS
Status: DISCONTINUED | OUTPATIENT
Start: 2022-08-19 | End: 2022-08-19 | Stop reason: HOSPADM

## 2022-08-19 RX ORDER — LOTEPREDNOL ETABONATE 5 MG/ML
1 SUSPENSION/ DROPS OPHTHALMIC EVERY EVENING
COMMUNITY
End: 2023-10-31

## 2022-08-19 RX ORDER — HALOPERIDOL 5 MG/ML
1 INJECTION INTRAMUSCULAR
Status: DISCONTINUED | OUTPATIENT
Start: 2022-08-19 | End: 2022-08-19 | Stop reason: HOSPADM

## 2022-08-19 RX ORDER — SODIUM CHLORIDE, SODIUM LACTATE, POTASSIUM CHLORIDE, CALCIUM CHLORIDE 600; 310; 30; 20 MG/100ML; MG/100ML; MG/100ML; MG/100ML
INJECTION, SOLUTION INTRAVENOUS CONTINUOUS
Status: DISCONTINUED | OUTPATIENT
Start: 2022-08-19 | End: 2022-08-19 | Stop reason: HOSPADM

## 2022-08-19 RX ORDER — TRAMADOL HYDROCHLORIDE 50 MG/1
50 TABLET ORAL EVERY 4 HOURS PRN
Qty: 30 TABLET | Refills: 0 | Status: SHIPPED | OUTPATIENT
Start: 2022-08-19 | End: 2022-08-24

## 2022-08-19 RX ORDER — OXYCODONE HCL 5 MG/5 ML
10 SOLUTION, ORAL ORAL
Status: DISCONTINUED | OUTPATIENT
Start: 2022-08-19 | End: 2022-08-19 | Stop reason: HOSPADM

## 2022-08-19 RX ORDER — CEFAZOLIN SODIUM 1 G/3ML
INJECTION, POWDER, FOR SOLUTION INTRAMUSCULAR; INTRAVENOUS PRN
Status: DISCONTINUED | OUTPATIENT
Start: 2022-08-19 | End: 2022-08-19 | Stop reason: SURG

## 2022-08-19 RX ORDER — BUPIVACAINE HYDROCHLORIDE AND EPINEPHRINE 5; 5 MG/ML; UG/ML
INJECTION, SOLUTION EPIDURAL; INTRACAUDAL; PERINEURAL
Status: DISCONTINUED | OUTPATIENT
Start: 2022-08-19 | End: 2022-08-19 | Stop reason: HOSPADM

## 2022-08-19 RX ORDER — MOXIFLOXACIN 5 MG/ML
1 SOLUTION/ DROPS OPHTHALMIC
Status: ON HOLD | COMMUNITY
End: 2022-08-19

## 2022-08-19 RX ORDER — DIPHENHYDRAMINE HYDROCHLORIDE 50 MG/ML
12.5 INJECTION INTRAMUSCULAR; INTRAVENOUS
Status: DISCONTINUED | OUTPATIENT
Start: 2022-08-19 | End: 2022-08-19 | Stop reason: HOSPADM

## 2022-08-19 RX ADMIN — PROPOFOL 250 MG: 10 INJECTION, EMULSION INTRAVENOUS at 08:49

## 2022-08-19 RX ADMIN — CEFAZOLIN 1 G: 330 INJECTION, POWDER, FOR SOLUTION INTRAMUSCULAR; INTRAVENOUS at 08:09

## 2022-08-19 RX ADMIN — SODIUM CHLORIDE, POTASSIUM CHLORIDE, SODIUM LACTATE AND CALCIUM CHLORIDE: 600; 310; 30; 20 INJECTION, SOLUTION INTRAVENOUS at 07:39

## 2022-08-19 ASSESSMENT — PAIN DESCRIPTION - PAIN TYPE: TYPE: SURGICAL PAIN

## 2022-08-19 ASSESSMENT — FIBROSIS 4 INDEX: FIB4 SCORE: 1.64

## 2022-08-19 NOTE — ANESTHESIA POSTPROCEDURE EVALUATION
Patient: Janeen Ritter    Procedure Summary     Date: 08/19/22 Room / Location: [facility] OR 09 / SURGERY [facility]    Anesthesia Start: 0759 Anesthesia Stop: 0850    Procedure: SACRAL NEUROMODULATION PERMANENT IMPLANTATION (Back) Diagnosis: (URINARY INCONTINENCE)    Surgeons: Holger Farris M.D. Responsible Provider: Claudio Eng M.D.    Anesthesia Type: MAC ASA Status: 2          Final Anesthesia Type: MAC  Last vitals  BP   Blood Pressure : (!) 143/97    Temp   36.5 °C (97.7 °F)    Pulse   69   Resp   (!) 42    SpO2   96 %      Anesthesia Post Evaluation    Patient location during evaluation: PACU  Patient participation: complete - patient participated  Level of consciousness: awake and alert    Airway patency: patent  Anesthetic complications: no  Cardiovascular status: hemodynamically stable  Respiratory status: acceptable  Hydration status: euvolemic    PONV: none          There were no known notable events for this encounter.     Nurse Pain Score: 0 (NPRS)

## 2022-08-19 NOTE — OR NURSING
Report given to Tish  RN via SBAR reviewed orders and patient history, no questions at this time.  Pt alert and oriented, resp even and nonlabored, in NAD, pt denies pain/nausea at this time. Pt moves all ext, follows commands and verbalized understanding  of poc and discharge/admission. Family notified via text/phone patient is moving to phase II/room. Will con't to monitor until patient has transitioned.

## 2022-08-19 NOTE — OP REPORT
NAME:  Janeen Ritter  MRN:  [id number]  :  1950    DATE OF OPERATION: 2022    PREOPERATIVE DIAGNOSIS:  Refractory Urinary Incontinence    POSTOPERATIVE DIAGNOSES:  Refractory Urinary Incontinence    OPERATION PERFORMED:   1. Complete sacral neuromodulation system implantation   2. Incision and implantation of tined quadrapolar lead electrodes in foramen S3   3. Intraoperative fluoroscopic guidance for needle placement and interpretation  4. Subcutaneous implantation of sacral nerve neurostimulator and electric analysis and programing     SURGEON: Holger Farris MD    ASSISTANT:  Gordo Hodge PA-C, PA-C    ANESTHESIOLOGIST:  Anesthesiologist: Claudio Eng M.D.    ANESTHESIA: conscious sedation     ESTIMATED BLOOD LOSS: <5cc.     INDICATIONS FOR PROCEDURE: The patient is a 72 y.o. female with a history of refractory urinary incontinence and multiple attempts and prior measures and treatments taken without adequate relief, with excellent improvement during SNM trial.  She is taken to the operating room today for sacral neuromodulation full system implantation .     DETAILS OF PROCEDURE:  After an extensive informed consent discussion and process, the patient was brought to the operating room. She was placed in a prone position on the operating table. After induction of conscious sedation, pillows were placed under the lower abdomen and under the shins to flatten the sacrum and allow the toes to dangle freely.  The patient was prepped and draped in the usual sterile fashion.     After administration of intravenous antibiotics, a local anesthetic mixture of bupivacaine with epinephrine was used to infiltrate the skin sites.  The C-arm was then draped and moved into AP position to provide fluoroscopic mapping of the sacral region which included marking out midline of the sacrum, SI joints, sciatic notches, medial foramenal borders and sacral foramena.  The C-arm was moved to the lateral position to  "image the area from sacral promontory to the coccyx.     The foramen needle was introduced approximately 2 cm above the sciatic notch and 2 cm lateral to the sacral midline, feeling for foramenal margins until the right S3 foramen was identified and penetrated.  The depth of the foramen needle was confirmed and adjusted fluoroscopically.  Proper needle position was confirmed by identification of location and sensation, direct observation of the lifting of the perineum or \"bellowing\", and observation of plantar flexion of the great toe utilizing the external test stimulator.     The foramen needle stylet was removed and a directional guide was placed and confirmed fluoroscopically.  The foramen needle was removed.  An incision was made peripherally to the directional guide through the fascial layer.  The lead introducer sheath and dilator was placed over the directional guide and directed into the foramen to ensure the radiopaque marker of the lead introducer did not extend beyond the anterior edge of the sacrum.  The dilator was unlocked and removed along with the directional guide.  The lead was then placed through the introducer sheath to the first white line.  The position was then checked fluoroscopically.  The lead was then further introduced until 3 electrodes were visible below the sacrum.  Each electrode was tested for location and patient sensation, visualization of \"salvatore\", and plantar flexion of the great toe.  After satisfactory positioning was confirmed, the introducer sheath was retracted under continuous fluoroscopy, deploying lead tines into the perisacral tissue.    Further incision was made into the subcutaneous tissue posterior to the right iliac crest and lateral to the sacrum.  Blunt dissection was continued until the gluteal fascia was identified and hemostasis was achieved allowing for a sufficient pocket for the neurostimulator.  A tunneling tool with straw was placed from the lead exit " site subcutaneously to the incised pocket site.  The tunneling tool was removed and the lead was fed through the straw and pulled out at the pocket site.  The lead was cleansed of bodily fluids and dried.  The lead was then inserted into the Tysdo neurostimulator header.  The single set screw was tightened with the hex wrench.    The neurostimulator was placed into the subcutaneous pocket with the etched identification side placed upwards and excessive lead wrapped counterclockwise around the neurostimulator.  The programming head connected and found adequate lead connection and that parameters were within normal limits.  Impedances were confirmed to be within normal limits, greater than 50 and less than 4,000.    The wounds were then irrigated with antibiotic solution and closed with 4-0 vicryl subcuticular sutures.  Steristrips and guaze 4x4's were placed over the incisions. The patient tolerated the procedure well and there were no apparent complications. All sponge, needle, and instrument counts were correct on 2 separate occasions.      She was awakened and transferred to the recovery room in satisfactory condition.Using the clinician , the generator was programmed for:  pulse frequency rate, pulse amplitude, pulse duration, cycling, stimulation train duration, train spacing, number of programs and alternating electrode polarities.  The final electrode selections were set.    The total time spent performing programming was approximately 15 min.  The patient was given instructions on utilizing the patient  prior to discharge.           ____________________________________   Holger Farris MD  DD: 8/19/2022  8:32 AM    CC:  [facility];

## 2022-08-19 NOTE — PROGRESS NOTES
6226 pt in phase 2, vss, pt denies pain, drsg on back CDI, pt reports ready to get dressed.  Dc instructions/follow up info reviewed.  Pt has device kit, was seen by reps. Safe to dc home

## 2022-08-19 NOTE — ANESTHESIA TIME REPORT
Anesthesia Start and Stop Event Times     Date Time Event    8/19/2022 0759 Anesthesia Start     0803 Ready for Procedure     0850 Anesthesia Stop        Responsible Staff  08/19/22    Name Role Begin End    Claudio Eng M.D. Anesth 0759 0850        Overtime Reason:  no overtime (within assigned shift)    Comments:

## 2022-08-19 NOTE — ANESTHESIA PREPROCEDURE EVALUATION
Case: [id number] Date/Time: 08/19/22 0745    Procedure: SACRAL NEUROMODULATION PERMANENT IMPLANTATION (Back)    Anesthesia type: General    Pre-op diagnosis: URINARY INCONTINENCE    Location: [facility] OR 09 / SURGERY [facility]    Surgeons: Holger Farris M.D.          Relevant Problems   PULMONARY   (positive) Asthma in adult   (positive) Mucopurulent chronic bronchitis (HCC)      NEURO   (positive) Other headache syndrome      CARDIAC   (positive) HTN (hypertension), benign   (positive) Hot flashes      GI   (positive) GERD (gastroesophageal reflux disease)         (positive) Chronic kidney disease (CKD) stage G3a/A1, moderately decreased glomerular filtration rate (GFR) between 45-59 mL/min/1.73 square meter and albuminuria creatinine ratio less than 30 mg/g (HCC)       Physical Exam    Airway   Mallampati: II  TM distance: >3 FB  Neck ROM: full       Cardiovascular - normal exam  Rhythm: regular  Rate: normal  (-) murmur     Dental - normal exam           Pulmonary - normal exam  Breath sounds clear to auscultation     Abdominal    Neurological - normal exam                 Anesthesia Plan    ASA 2       Plan - MAC               Induction: intravenous    Postoperative Plan: Postoperative administration of opioids is intended.    Pertinent diagnostic labs and testing reviewed    Informed Consent:    Anesthetic plan and risks discussed with patient.    Use of blood products discussed with: patient whom consented to blood products.

## 2022-08-19 NOTE — DISCHARGE INSTRUCTIONS
If any questions arise, call your provider.  If your provider is not available, please feel free to call the Surgical Center at [phone].    MEDICATIONS: Resume taking daily medication.  Take prescribed pain medication with food.  If no medication is prescribed, you may take non-aspirin pain medication if needed.  PAIN MEDICATION CAN BE VERY CONSTIPATING.  Take a stool softener or laxative such as senokot, pericolace, or milk of magnesia if needed.    Can take pain medications anytime    1. DIET: Upon discharge from the hospital you may resume your normal preoperative diet. Depending on how you are feeling and whether you have nausea or not, you may wish to stay with a bland diet for the first few days. However, you can advance this as quickly as you feel ready.     2. ACTIVITIES: After discharge from the hospital, you may resume full routine activities. However, there should be no heavy lifting (greater than 15 pounds) and no strenuous activities until after your follow-up visit. Otherwise, routine activities of daily living are acceptable.     3. DRIVING: You may drive whenever you are off pain medications and are able to perform the activities needed to drive, i.e. turning, bending, twisting, etc.     4. BATHING: You may get the wound wet 2 days after surgery. You may shower, but do not submerge in a bath for at least a week. Dressings may come off after 48 hours. You have steri-strips under your dressings. These steri-strips should stay in place. They will fall off over 5-7 days.     5. BOWEL FUNCTION: Constipation is common after an operation, especially with pain medications. The combination of pain medication and decreased activity level can cause constipation in otherwise normal patients. If you feel this is occurring, take a laxative (Miralax, Milk of Magnesia, Ex-Lax, Senokot, etc.) until the problem has resolved.     6. PAIN MEDICATION: You will be given a prescription for pain medication at  discharge. Please take these as directed. It is important to remember not to take medications on an empty stomach as this may cause nausea.     7.CALL IF YOU HAVE: (1) Fevers to more than 101.0 F, (2) Unusual chest or leg pain, (3) Drainage or fluid from incision that may be foul smelling, increased tenderness or soreness at the wound or the wound edges are no longer together, redness or swelling at the incision site. Please do not hesitate to call with any other questions.     What to Expect Post Anesthesia    Rest and take it easy for the first 24 hours.  A responsible adult is recommended to remain with you during that time.  It is normal to feel sleepy.  We encourage you to not do anything that requires balance, judgment or coordination.    FOR 24 HOURS DO NOT:  Drive, operate machinery or run household appliances.  Drink beer or alcoholic beverages.  Make important decisions or sign legal documents.    To avoid nausea, slowly advance diet as tolerated, avoiding spicy or greasy foods for the first day.  Add more substantial food to your diet according to your provider's instructions.  Babies can be fed formula or breast milk as soon as they are hungry.  INCREASE FLUIDS AND FIBER TO AVOID CONSTIPATION.    MILD FLU-LIKE SYMPTOMS ARE NORMAL.  YOU MAY EXPERIENCE GENERALIZED MUSCLE ACHES, THROAT IRRITATION, HEADACHE AND/OR SOME NAUSEA.    8. APPOINTMENT: Contact our office at [phone] for a follow-up appointment in 1-2 weeks following your procedure. If you have any additional questions, please do not hesitate to call the office and speak to either myself or the physician on call. Office address: [facility]. [address] [place] [address] [address] [id number]

## 2022-09-20 ENCOUNTER — HOSPITAL ENCOUNTER (OUTPATIENT)
Facility: MEDICAL CENTER | Age: 72
End: 2022-09-20
Attending: FAMILY MEDICINE
Payer: MEDICARE

## 2022-09-20 ENCOUNTER — OFFICE VISIT (OUTPATIENT)
Dept: MEDICAL GROUP | Facility: MEDICAL CENTER | Age: 72
End: 2022-09-20
Payer: MEDICARE

## 2022-09-20 VITALS
DIASTOLIC BLOOD PRESSURE: 64 MMHG | SYSTOLIC BLOOD PRESSURE: 108 MMHG | OXYGEN SATURATION: 95 % | HEIGHT: 61 IN | WEIGHT: 122.4 LBS | HEART RATE: 64 BPM | TEMPERATURE: 98.1 F | BODY MASS INDEX: 23.11 KG/M2

## 2022-09-20 DIAGNOSIS — Z23 NEED FOR VACCINATION: ICD-10-CM

## 2022-09-20 DIAGNOSIS — N32.81 OVERACTIVE BLADDER: ICD-10-CM

## 2022-09-20 DIAGNOSIS — R10.84 GENERALIZED ABDOMINAL PAIN: ICD-10-CM

## 2022-09-20 DIAGNOSIS — R35.0 URINARY FREQUENCY: ICD-10-CM

## 2022-09-20 PROBLEM — E07.9 THYROID DISORDER: Status: ACTIVE | Noted: 2022-09-20

## 2022-09-20 PROBLEM — M19.90 ARTHRITIS: Status: ACTIVE | Noted: 2022-09-20

## 2022-09-20 PROBLEM — R32 URINARY INCONTINENCE: Status: ACTIVE | Noted: 2017-03-02

## 2022-09-20 LAB
APPEARANCE UR: CLEAR
BILIRUB UR STRIP-MCNC: NEGATIVE MG/DL
COLOR UR AUTO: YELLOW
GLUCOSE UR STRIP.AUTO-MCNC: NEGATIVE MG/DL
KETONES UR STRIP.AUTO-MCNC: NEGATIVE MG/DL
LEUKOCYTE ESTERASE UR QL STRIP.AUTO: NEGATIVE
NITRITE UR QL STRIP.AUTO: NEGATIVE
PH UR STRIP.AUTO: 5.5 [PH] (ref 5–8)
PROT UR QL STRIP: NEGATIVE MG/DL
RBC UR QL AUTO: NEGATIVE
SP GR UR STRIP.AUTO: 1.02
UROBILINOGEN UR STRIP-MCNC: 0.2 MG/DL

## 2022-09-20 PROCEDURE — 87086 URINE CULTURE/COLONY COUNT: CPT

## 2022-09-20 PROCEDURE — 81002 URINALYSIS NONAUTO W/O SCOPE: CPT | Performed by: FAMILY MEDICINE

## 2022-09-20 PROCEDURE — 90662 IIV NO PRSV INCREASED AG IM: CPT | Performed by: FAMILY MEDICINE

## 2022-09-20 PROCEDURE — 99214 OFFICE O/P EST MOD 30 MIN: CPT | Mod: 25 | Performed by: FAMILY MEDICINE

## 2022-09-20 PROCEDURE — G0008 ADMIN INFLUENZA VIRUS VAC: HCPCS | Performed by: FAMILY MEDICINE

## 2022-09-20 RX ORDER — FLUTICASONE PROPIONATE 50 MCG
1 SPRAY, SUSPENSION (ML) NASAL DAILY
Qty: 16 G | Refills: 1 | Status: SHIPPED | OUTPATIENT
Start: 2022-09-20

## 2022-09-20 ASSESSMENT — FIBROSIS 4 INDEX: FIB4 SCORE: 1.64

## 2022-09-20 NOTE — PROGRESS NOTES
"Subjective:     CC: \"abdominal pain\"    HPI:   Janeen presents today with:    2 weeks  Stomach pain  Drinking 30g protein shake (like boost)  Taking Focus for mind  Started Zinc supplement  Location middle and epigastric  Has been constant  Intense ache, no nausea, no vomiting  Endorses normal stool, daily, no straining or blood in stools  Working on eating more regular, cutting back on junk foods, eating a lot of fruit and veggies  Endorses gas pains  Also having left sided low back pain, can't sleep on that side  Electric blanket is helpful  Feels like mobility is worsening, needing to use cane sometimes, can't maintain balance  Having dry skin  No pain with urination  Is having increased frequency  Having worse congestion, difficult to cough it out    Problem   Thyroid Disorder   Overactive Bladder   Arthritis   Urinary Incontinence   Mixed Anxiety and Depressive Disorder               Current Outpatient Medications Ordered in Epic   Medication Sig Dispense Refill    fluticasone (FLONASE) 50 MCG/ACT nasal spray Administer 1 Spray into affected nostril(S) every day. 16 g 1    loteprednol etabonate (LOTEMAX) 0.5 % ophthalmic suspension Administer 1 Drop into both eyes every evening.      ARTIFICIAL TEAR OINTMENT OP Administer 1 Application into both eyes as needed.      Probiotic Product (PROBIOTIC DAILY) Cap Take 1 Capsule by mouth every evening.      Non Formulary Request Take 1 Tablet by mouth every evening. \"Focus for brain multiple vitamin\" takes 1 po tab daily      Multiple Vitamins-Minerals (PRESERVISION AREDS) Tab Take 1 Tablet by mouth every evening.      valsartan (DIOVAN) 40 MG Tab Take 1 Tablet by mouth every day. 100 Tablet 3    traZODone (DESYREL) 50 MG Tab Take 0.5 Tablets by mouth every evening. per 90 Tablet 1    hydrOXYzine HCl (ATARAX) 50 MG Tab Take 1 Tablet by mouth 3 times a day as needed for Anxiety. (Patient taking differently: Take 100 mg by mouth 2 times a day as needed for Anxiety.) 270 " Tablet 0    oxybutynin (DITROPAN) 5 MG Tab TAKE ONE TABLET BY MOUTH TWICE DAILY (Patient taking differently: Take 5 mg by mouth 2 times a day. TAKE ONE TABLET BY MOUTH TWICE DAILY) 180 Tablet 0    baclofen (LIORESAL) 10 MG Tab Take 1 Tablet by mouth every day. (Patient taking differently: Take 10 mg by mouth every evening.) 100 Tablet 1    estradiol (ESTRACE) 0.5 MG tablet Take 1 Tablet by mouth every day. (Patient taking differently: Take 0.5 mg by mouth every evening.) 90 Tablet 3    omeprazole (PRILOSEC) 40 MG delayed-release capsule TAKE ONE CAPSULE BY MOUTH ONE TIME DAILY (Patient taking differently: Take 40 mg by mouth every evening. TAKE ONE CAPSULE BY MOUTH ONE TIME DAILY) 90 Capsule 3    celecoxib (CELEBREX) 200 MG Cap TAKE ONE CAPSULE BY MOUTH ONE TIME DAILY (Patient taking differently: Take 200 mg by mouth every evening. TAKE ONE CAPSULE BY MOUTH ONE TIME DAILY) 100 Capsule 3    fluocinonide (LIDEX) 0.05 % Cream AAA posterior scalp, neck, back, BID PRN Itching. Avoid use on face, axilla, groin. (Patient taking differently: Apply 1 Each topically 2 times a day as needed. AAA posterior scalp, neck, back, BID PRN Itching. Avoid use on face, axilla, groin.) 60 g 1    Rimegepant Sulfate 75 MG TABLET DISPERSIBLE Take 1 Tablet by mouth 1 time a day as needed for up to 16 doses. 30 Tablet 12    atorvastatin (LIPITOR) 80 MG tablet TAKE 1 TABLET BY MOUTH ONCE DAILY IN THE EVENING (Patient taking differently: Take 80 mg by mouth every evening. TAKE 1 TABLET BY MOUTH ONCE DAILY IN THE EVENING) 100 Tablet 2    Krill Oil (OMEGA-3) 500 MG Cap Take 500 mg by mouth every evening.      Flaxseed, Linseed, (FLAXSEED OIL PO) Take 1 Tablet by mouth every evening.      sertraline (ZOLOFT) 100 MG Tab Take 1 Tab by mouth every day. (Patient taking differently: Take 100 mg by mouth every evening.) 90 Tab 3     No current Epic-ordered facility-administered medications on file.       Health Maintenance: Influenza  "vaccine    ROS:  ROS see HPI    Objective:     Exam:  /64   Pulse 64   Temp 36.7 °C (98.1 °F) (Temporal)   Ht 1.549 m (5' 1\")   Wt 55.5 kg (122 lb 6.4 oz)   SpO2 95%   BMI 23.13 kg/m²  Body mass index is 23.13 kg/m².    Physical Exam  Vitals reviewed.   Constitutional:       General: She is not in acute distress.     Appearance: Normal appearance. She is normal weight. She is not ill-appearing or toxic-appearing.   HENT:      Head: Normocephalic and atraumatic.   Cardiovascular:      Rate and Rhythm: Normal rate and regular rhythm.      Heart sounds: Normal heart sounds.   Pulmonary:      Effort: Pulmonary effort is normal.      Breath sounds: Normal breath sounds.   Abdominal:      General: There is no distension.      Palpations: Abdomen is soft.      Tenderness: There is no abdominal tenderness. There is no right CVA tenderness, left CVA tenderness or guarding.   Skin:     General: Skin is warm and dry.   Neurological:      Mental Status: She is alert. Mental status is at baseline.      Gait: Gait abnormal (uses cane).   Psychiatric:         Mood and Affect: Mood normal.         Behavior: Behavior normal.     Labs:       Assessment & Plan:     72 y.o. female with the following -     Problem List Items Addressed This Visit       Overactive bladder  Patient s/p procedure with Dr. Sanders, she is concerned that the device is no longer working, will follow up with his office regarding this.    Relevant Orders    POCT Urinalysis (Completed)    URINE CULTURE(NEW)     Other Visit Diagnoses       Need for vaccination        Relevant Orders    INFLUENZA VACCINE, HIGH DOSE (65+ ONLY) (Completed)    Urinary frequency      Will check for urinary infection, POC dip not suggestive of this    Relevant Orders    POCT Urinalysis (Completed)    URINE CULTURE(NEW)    Generalized abdominal pain      Discussed with patient to continue omeprazole. Suspect her diet changes. Can try cutting back on supplemental shakes and " pills. Continue fruits and veggies though that can cause some bloating. Discussed using a heating pad and simethicone for symptom control.    Relevant Orders    POCT Urinalysis (Completed)    URINE CULTURE(NEW)                Return if symptoms worsen or fail to improve.    Please note that this dictation was created using voice recognition software. I have made every reasonable attempt to correct obvious errors, but I expect that there are errors of grammar and possibly content that I did not discover before finalizing the note.

## 2022-09-23 LAB
BACTERIA UR CULT: NORMAL
SIGNIFICANT IND 70042: NORMAL
SITE SITE: NORMAL
SOURCE SOURCE: NORMAL

## 2022-10-05 DIAGNOSIS — E78.5 DYSLIPIDEMIA: Chronic | ICD-10-CM

## 2022-10-06 RX ORDER — ATORVASTATIN CALCIUM 80 MG/1
80 TABLET, FILM COATED ORAL EVERY EVENING
Qty: 100 TABLET | Refills: 3 | Status: SHIPPED | OUTPATIENT
Start: 2022-10-06 | End: 2024-02-28 | Stop reason: SDUPTHER

## 2022-10-06 RX ORDER — HYDROXYZINE 50 MG/1
50 TABLET, FILM COATED ORAL 2 TIMES DAILY PRN
Qty: 90 TABLET | Refills: 3 | Status: SHIPPED | OUTPATIENT
Start: 2022-10-06 | End: 2023-03-17 | Stop reason: SDUPTHER

## 2022-10-13 ENCOUNTER — APPOINTMENT (OUTPATIENT)
Dept: MEDICAL GROUP | Facility: MEDICAL CENTER | Age: 72
End: 2022-10-13
Payer: MEDICARE

## 2022-10-19 ENCOUNTER — OFFICE VISIT (OUTPATIENT)
Dept: MEDICAL GROUP | Facility: MEDICAL CENTER | Age: 72
End: 2022-10-19
Payer: MEDICARE

## 2022-10-19 ENCOUNTER — HOSPITAL ENCOUNTER (OUTPATIENT)
Dept: LAB | Facility: MEDICAL CENTER | Age: 72
End: 2022-10-19
Attending: FAMILY MEDICINE
Payer: MEDICARE

## 2022-10-19 VITALS
WEIGHT: 120 LBS | HEIGHT: 61 IN | DIASTOLIC BLOOD PRESSURE: 78 MMHG | SYSTOLIC BLOOD PRESSURE: 108 MMHG | BODY MASS INDEX: 22.66 KG/M2 | TEMPERATURE: 97.7 F

## 2022-10-19 DIAGNOSIS — R19.5 DARK STOOLS: ICD-10-CM

## 2022-10-19 DIAGNOSIS — N32.81 OVERACTIVE BLADDER: ICD-10-CM

## 2022-10-19 DIAGNOSIS — M54.42 CHRONIC BILATERAL LOW BACK PAIN WITH LEFT-SIDED SCIATICA: ICD-10-CM

## 2022-10-19 DIAGNOSIS — G89.29 CHRONIC BILATERAL LOW BACK PAIN WITH LEFT-SIDED SCIATICA: ICD-10-CM

## 2022-10-19 DIAGNOSIS — R19.7 DIARRHEA, UNSPECIFIED TYPE: ICD-10-CM

## 2022-10-19 PROBLEM — R32 URINARY INCONTINENCE: Status: RESOLVED | Noted: 2017-03-02 | Resolved: 2022-10-19

## 2022-10-19 LAB
ALBUMIN SERPL BCP-MCNC: 4.7 G/DL (ref 3.2–4.9)
ALBUMIN/GLOB SERPL: 1.6 G/DL
ALP SERPL-CCNC: 74 U/L (ref 30–99)
ALT SERPL-CCNC: 20 U/L (ref 2–50)
ANION GAP SERPL CALC-SCNC: 8 MMOL/L (ref 7–16)
AST SERPL-CCNC: 26 U/L (ref 12–45)
BILIRUB SERPL-MCNC: 0.7 MG/DL (ref 0.1–1.5)
BUN SERPL-MCNC: 21 MG/DL (ref 8–22)
CALCIUM SERPL-MCNC: 9.3 MG/DL (ref 8.5–10.5)
CHLORIDE SERPL-SCNC: 101 MMOL/L (ref 96–112)
CO2 SERPL-SCNC: 27 MMOL/L (ref 20–33)
CREAT SERPL-MCNC: 0.83 MG/DL (ref 0.5–1.4)
ERYTHROCYTE [DISTWIDTH] IN BLOOD BY AUTOMATED COUNT: 40.1 FL (ref 35.9–50)
GFR SERPLBLD CREATININE-BSD FMLA CKD-EPI: 75 ML/MIN/1.73 M 2
GLOBULIN SER CALC-MCNC: 2.9 G/DL (ref 1.9–3.5)
GLUCOSE SERPL-MCNC: 87 MG/DL (ref 65–99)
HCT VFR BLD AUTO: 40 % (ref 37–47)
HGB BLD-MCNC: 13.4 G/DL (ref 12–16)
LIPASE SERPL-CCNC: 43 U/L (ref 11–82)
MCH RBC QN AUTO: 28.8 PG (ref 27–33)
MCHC RBC AUTO-ENTMCNC: 33.5 G/DL (ref 33.6–35)
MCV RBC AUTO: 86 FL (ref 81.4–97.8)
PLATELET # BLD AUTO: 231 K/UL (ref 164–446)
PMV BLD AUTO: 10 FL (ref 9–12.9)
POTASSIUM SERPL-SCNC: 3.9 MMOL/L (ref 3.6–5.5)
PROT SERPL-MCNC: 7.6 G/DL (ref 6–8.2)
RBC # BLD AUTO: 4.65 M/UL (ref 4.2–5.4)
SODIUM SERPL-SCNC: 136 MMOL/L (ref 135–145)
WBC # BLD AUTO: 6.4 K/UL (ref 4.8–10.8)

## 2022-10-19 PROCEDURE — 80053 COMPREHEN METABOLIC PANEL: CPT

## 2022-10-19 PROCEDURE — 85027 COMPLETE CBC AUTOMATED: CPT

## 2022-10-19 PROCEDURE — 36415 COLL VENOUS BLD VENIPUNCTURE: CPT

## 2022-10-19 PROCEDURE — 83690 ASSAY OF LIPASE: CPT

## 2022-10-19 PROCEDURE — 99214 OFFICE O/P EST MOD 30 MIN: CPT | Performed by: FAMILY MEDICINE

## 2022-10-19 RX ORDER — BACLOFEN 10 MG/1
10 TABLET ORAL EVERY EVENING
Qty: 100 TABLET | Refills: 3 | Status: SHIPPED | OUTPATIENT
Start: 2022-10-19 | End: 2023-12-04

## 2022-10-19 ASSESSMENT — ENCOUNTER SYMPTOMS
FEVER: 0
NAUSEA: 0
CHILLS: 0
BLOOD IN STOOL: 0
BACK PAIN: 0
SHORTNESS OF BREATH: 0
VOMITING: 0
HEARTBURN: 0
WEIGHT LOSS: 0
HEADACHES: 0
DIZZINESS: 0

## 2022-10-19 ASSESSMENT — FIBROSIS 4 INDEX: FIB4 SCORE: 1.64

## 2022-10-19 NOTE — ASSESSMENT & PLAN NOTE
Patient had procedure done to have implantable bladder stimulator placed by Dr. Sanders. She reports it is working well and would recommend it to others.

## 2022-10-19 NOTE — PROGRESS NOTES
"Subjective:     CC: \"diarrhea and stomach pain\"    HPI:   Janeen presents today with:    Has the device for her urine is working well  Would recommend others get this for overactive bladder  Feels like control is more normal    Stomach issues  Is intermittently painful, describes as an ache type pain  Worse with orange juice  Having diarrhea daily, with black stools  Been going on for 2 weeks  Has been using pepto bismol  Patient is taking omeprazole daily, if she missed gets bad heart burn  Had negative cologuard in 2020  Had Covid exposure just finished 7 day of quarantine, no change in symptoms during that time and feels well    Problem   Overactive Bladder   Urinary Incontinence (Resolved)       Current Outpatient Medications Ordered in Epic   Medication Sig Dispense Refill    baclofen (LIORESAL) 10 MG Tab Take 1 Tablet by mouth every evening. 100 Tablet 3    hydrOXYzine HCl (ATARAX) 50 MG Tab Take 1 Tablet by mouth 2 times a day as needed for Anxiety. 90 Tablet 3    atorvastatin (LIPITOR) 80 MG tablet Take 1 Tablet by mouth every evening. TAKE 1 TABLET BY MOUTH ONCE DAILY IN THE EVENING 100 Tablet 3    fluticasone (FLONASE) 50 MCG/ACT nasal spray Administer 1 Spray into affected nostril(S) every day. 16 g 1    loteprednol etabonate (LOTEMAX) 0.5 % ophthalmic suspension Administer 1 Drop into both eyes every evening.      ARTIFICIAL TEAR OINTMENT OP Administer 1 Application into both eyes as needed.      Probiotic Product (PROBIOTIC DAILY) Cap Take 1 Capsule by mouth every evening.      Non Formulary Request Take 1 Tablet by mouth every evening. \"Focus for brain multiple vitamin\" takes 1 po tab daily      Multiple Vitamins-Minerals (PRESERVISION AREDS) Tab Take 1 Tablet by mouth every evening.      valsartan (DIOVAN) 40 MG Tab Take 1 Tablet by mouth every day. 100 Tablet 3    traZODone (DESYREL) 50 MG Tab Take 0.5 Tablets by mouth every evening. per 90 Tablet 1    oxybutynin (DITROPAN) 5 MG Tab TAKE ONE TABLET " "BY MOUTH TWICE DAILY (Patient taking differently: Take 5 mg by mouth 2 times a day. TAKE ONE TABLET BY MOUTH TWICE DAILY) 180 Tablet 0    estradiol (ESTRACE) 0.5 MG tablet Take 1 Tablet by mouth every day. (Patient taking differently: Take 0.5 mg by mouth every evening.) 90 Tablet 3    omeprazole (PRILOSEC) 40 MG delayed-release capsule TAKE ONE CAPSULE BY MOUTH ONE TIME DAILY (Patient taking differently: Take 40 mg by mouth every evening. TAKE ONE CAPSULE BY MOUTH ONE TIME DAILY) 90 Capsule 3    celecoxib (CELEBREX) 200 MG Cap TAKE ONE CAPSULE BY MOUTH ONE TIME DAILY (Patient taking differently: Take 200 mg by mouth every evening. TAKE ONE CAPSULE BY MOUTH ONE TIME DAILY) 100 Capsule 3    fluocinonide (LIDEX) 0.05 % Cream AAA posterior scalp, neck, back, BID PRN Itching. Avoid use on face, axilla, groin. (Patient taking differently: Apply 1 Each topically 2 times a day as needed. AAA posterior scalp, neck, back, BID PRN Itching. Avoid use on face, axilla, groin.) 60 g 1    Krill Oil (OMEGA-3) 500 MG Cap Take 500 mg by mouth every evening.      Flaxseed, Linseed, (FLAXSEED OIL PO) Take 1 Tablet by mouth every evening.      sertraline (ZOLOFT) 100 MG Tab Take 1 Tab by mouth every day. (Patient taking differently: Take 100 mg by mouth every evening.) 90 Tab 3     No current Epic-ordered facility-administered medications on file.       Health Maintenance: relatively up to date    ROS:  Review of Systems   Constitutional:  Negative for chills, fever and weight loss.   Respiratory:  Negative for shortness of breath.    Cardiovascular:  Negative for chest pain.   Gastrointestinal:  Negative for blood in stool, heartburn, nausea and vomiting.   Genitourinary:  Negative for dysuria.   Musculoskeletal:  Negative for back pain.   Neurological:  Negative for dizziness and headaches.     Objective:     Exam:  /78   Temp 36.5 °C (97.7 °F) (Temporal)   Ht 1.549 m (5' 1\")   Wt 54.4 kg (120 lb)   BMI 22.67 kg/m²  Body " mass index is 22.67 kg/m².    Physical Exam  Vitals reviewed.   Constitutional:       General: She is not in acute distress.     Appearance: Normal appearance.   HENT:      Head: Normocephalic and atraumatic.   Cardiovascular:      Rate and Rhythm: Normal rate and regular rhythm.      Heart sounds: Normal heart sounds.   Pulmonary:      Effort: Pulmonary effort is normal.      Breath sounds: Normal breath sounds.   Abdominal:      General: There is no distension.      Palpations: Abdomen is soft.      Tenderness: There is no abdominal tenderness. There is no guarding.   Skin:     General: Skin is warm and dry.   Neurological:      Mental Status: She is alert. Mental status is at baseline.      Gait: Gait normal.   Psychiatric:         Mood and Affect: Mood normal.         Behavior: Behavior normal.         Assessment & Plan:     72 y.o. female with the following -     Problem List Items Addressed This Visit       Overactive bladder     Patient had procedure done to have implantable bladder stimulator placed by Dr. Sanders. She reports it is working well and would recommend it to others.          Other Visit Diagnoses       Chronic bilateral low back pain with left-sided sciatica      Patient out of baclofen will provide refill    Relevant Medications    baclofen (LIORESAL) 10 MG Tab    Dark stools      Discussed that Pepto bismol can cause this. Reassuring that colon cancer screening is up to date and negative. Benign exam. Will check for occult blood in stool. Patient normotensive and not tachycardic, but will check CBC as well. Discussed ER precautions.    Relevant Orders    OCCULT BLOOD,FECAL,IMMUNOASSAY    CBC WITHOUT DIFFERENTIAL    Comp Metabolic Panel    LIPASE    Diarrhea, unspecified type      No obvious signs or symptoms of infection. Exam is benign today. Will check labs. Discussed diet modification and low dose immodium on days she has loose stools.     Relevant Orders    CBC WITHOUT DIFFERENTIAL    Comp  Metabolic Panel    LIPASE            Return in about 2 weeks (around 11/2/2022) for Lab F/U, Lifestyle.    Please note that this dictation was created using voice recognition software. I have made every reasonable attempt to correct obvious errors, but I expect that there are errors of grammar and possibly content that I did not discover before finalizing the note.

## 2022-11-10 ENCOUNTER — OFFICE VISIT (OUTPATIENT)
Dept: MEDICAL GROUP | Facility: MEDICAL CENTER | Age: 72
End: 2022-11-10
Payer: MEDICARE

## 2022-11-10 VITALS
WEIGHT: 120.2 LBS | DIASTOLIC BLOOD PRESSURE: 64 MMHG | OXYGEN SATURATION: 97 % | SYSTOLIC BLOOD PRESSURE: 122 MMHG | RESPIRATION RATE: 16 BRPM | HEIGHT: 61 IN | BODY MASS INDEX: 22.69 KG/M2 | TEMPERATURE: 98.2 F | HEART RATE: 60 BPM

## 2022-11-10 DIAGNOSIS — M79.604 PAIN IN BOTH LOWER EXTREMITIES: ICD-10-CM

## 2022-11-10 DIAGNOSIS — M79.605 PAIN IN BOTH LOWER EXTREMITIES: ICD-10-CM

## 2022-11-10 DIAGNOSIS — R54 FRAIL ELDERLY: ICD-10-CM

## 2022-11-10 DIAGNOSIS — Z91.81 RISK FOR FALLS: ICD-10-CM

## 2022-11-10 DIAGNOSIS — R19.5 DARK STOOLS: ICD-10-CM

## 2022-11-10 PROCEDURE — 99214 OFFICE O/P EST MOD 30 MIN: CPT | Performed by: FAMILY MEDICINE

## 2022-11-10 ASSESSMENT — FIBROSIS 4 INDEX: FIB4 SCORE: 1.79

## 2022-11-10 NOTE — PROGRESS NOTES
"Subjective:     CC: \"lab follow up\"    HPI:   Janeen presents today with:    Still having dark stools but not every time  Not taking pepto  Still has sharp stomach pain but is decreasing in frequency and intensity  Heart burn reportedly under control  Has cut down on pepsi  Eating more propper meals  Eating more vegetables  Immodium has helped with diarrhea and does not need it every day either  She was not able to fecal blood test as her  through out the envelope  Labs from 10/19 showed no concerning abnormalities, H&H stable, Lipase not elevated, CMP WNL    Problem   Frail Elderly   Risk for Falls       Current Outpatient Medications Ordered in Epic   Medication Sig Dispense Refill   • baclofen (LIORESAL) 10 MG Tab Take 1 Tablet by mouth every evening. 100 Tablet 3   • hydrOXYzine HCl (ATARAX) 50 MG Tab Take 1 Tablet by mouth 2 times a day as needed for Anxiety. 90 Tablet 3   • atorvastatin (LIPITOR) 80 MG tablet Take 1 Tablet by mouth every evening. TAKE 1 TABLET BY MOUTH ONCE DAILY IN THE EVENING 100 Tablet 3   • fluticasone (FLONASE) 50 MCG/ACT nasal spray Administer 1 Spray into affected nostril(S) every day. 16 g 1   • loteprednol etabonate (LOTEMAX) 0.5 % ophthalmic suspension Administer 1 Drop into both eyes every evening.     • ARTIFICIAL TEAR OINTMENT OP Administer 1 Application into both eyes as needed.     • Probiotic Product (PROBIOTIC DAILY) Cap Take 1 Capsule by mouth every evening.     • Non Formulary Request Take 1 Tablet by mouth every evening. \"Focus for brain multiple vitamin\" takes 1 po tab daily     • Multiple Vitamins-Minerals (PRESERVISION AREDS) Tab Take 1 Tablet by mouth every evening.     • valsartan (DIOVAN) 40 MG Tab Take 1 Tablet by mouth every day. 100 Tablet 3   • traZODone (DESYREL) 50 MG Tab Take 0.5 Tablets by mouth every evening. per 90 Tablet 1   • oxybutynin (DITROPAN) 5 MG Tab TAKE ONE TABLET BY MOUTH TWICE DAILY (Patient taking differently: Take 5 mg by mouth 2 " "times a day. TAKE ONE TABLET BY MOUTH TWICE DAILY) 180 Tablet 0   • estradiol (ESTRACE) 0.5 MG tablet Take 1 Tablet by mouth every day. (Patient taking differently: Take 0.5 mg by mouth every evening.) 90 Tablet 3   • omeprazole (PRILOSEC) 40 MG delayed-release capsule TAKE ONE CAPSULE BY MOUTH ONE TIME DAILY (Patient taking differently: Take 40 mg by mouth every evening. TAKE ONE CAPSULE BY MOUTH ONE TIME DAILY) 90 Capsule 3   • celecoxib (CELEBREX) 200 MG Cap TAKE ONE CAPSULE BY MOUTH ONE TIME DAILY (Patient taking differently: Take 200 mg by mouth every evening. TAKE ONE CAPSULE BY MOUTH ONE TIME DAILY) 100 Capsule 3   • fluocinonide (LIDEX) 0.05 % Cream AAA posterior scalp, neck, back, BID PRN Itching. Avoid use on face, axilla, groin. (Patient taking differently: Apply 1 Each topically 2 times a day as needed. AAA posterior scalp, neck, back, BID PRN Itching. Avoid use on face, axilla, groin.) 60 g 1   • Krill Oil (OMEGA-3) 500 MG Cap Take 500 mg by mouth every evening.     • Flaxseed, Linseed, (FLAXSEED OIL PO) Take 1 Tablet by mouth every evening.     • sertraline (ZOLOFT) 100 MG Tab Take 1 Tab by mouth every day. (Patient taking differently: Take 100 mg by mouth every evening.) 90 Tab 3     No current Epic-ordered facility-administered medications on file.       Health Maintenance: relatively up to date    ROS:  ROS see HPI    Objective:     Exam:  /64 (BP Location: Left arm, Patient Position: Sitting, BP Cuff Size: Adult)   Pulse 60   Temp 36.8 °C (98.2 °F) (Temporal)   Resp 16   Ht 1.549 m (5' 1\")   Wt 54.5 kg (120 lb 3.2 oz)   SpO2 97%   BMI 22.71 kg/m²  Body mass index is 22.71 kg/m².    Physical Exam  Vitals reviewed.   Constitutional:       General: She is not in acute distress.     Appearance: Normal appearance.   HENT:      Head: Normocephalic and atraumatic.   Cardiovascular:      Rate and Rhythm: Normal rate and regular rhythm.      Heart sounds: Normal heart sounds.   Pulmonary:      " Effort: Pulmonary effort is normal.      Breath sounds: Normal breath sounds.   Abdominal:      General: There is no distension.      Palpations: Abdomen is soft.      Tenderness: There is no abdominal tenderness. There is no guarding.   Skin:     General: Skin is warm and dry.   Neurological:      Mental Status: She is alert. Mental status is at baseline.   Psychiatric:         Mood and Affect: Mood normal.         Behavior: Behavior normal.         Assessment & Plan:     72 y.o. female with the following -     Problem List Items Addressed This Visit       Risk for falls     No recent falls, keeps cane available to her in every room. Would like to work on balance and leg strength.         Relevant Orders    Referral to Physical Therapy    Frail elderly     Patient is fall risk, noted to have CKD and Osteopenia  Will work on improving strength and functionality         Relevant Orders    Referral to Physical Therapy     Other Visit Diagnoses       Dark stools      This has improved and CBC WNL and stable, still occurring though so will check, reorder as last envelopes were inappropriately filed    Relevant Orders    OCCULT BLOOD X3 (STOOL)    Pain in both lower extremities      She endorses claudication, is on statin and LDL in good range. Quantaflo did show 30-50% blockage, she would like to check her blood flow further. Will get SHANIA    Relevant Orders    US-EXTREMITY ARTERY LOWER BILAT W/SHANIA (COMBO)    Referral to Physical Therapy                Return in about 3 months (around 2/10/2023), or if symptoms worsen or fail to improve, for Lifestyle.    Please note that this dictation was created using voice recognition software. I have made every reasonable attempt to correct obvious errors, but I expect that there are errors of grammar and possibly content that I did not discover before finalizing the note.

## 2022-11-10 NOTE — ASSESSMENT & PLAN NOTE
No recent falls, keeps cane available to her in every room. Would like to work on balance and leg strength.

## 2022-11-10 NOTE — ASSESSMENT & PLAN NOTE
Patient is fall risk, noted to have CKD and Osteopenia  Will work on improving strength and functionality

## 2023-01-03 DIAGNOSIS — I10 HTN (HYPERTENSION), BENIGN: ICD-10-CM

## 2023-01-03 RX ORDER — VALSARTAN 40 MG/1
40 TABLET ORAL DAILY
Qty: 100 TABLET | Refills: 3 | Status: SHIPPED | OUTPATIENT
Start: 2023-01-03 | End: 2023-12-18

## 2023-03-15 RX ORDER — ESTRADIOL 0.5 MG/1
0.5 TABLET ORAL NIGHTLY
Qty: 90 TABLET | Refills: 3 | Status: SHIPPED | OUTPATIENT
Start: 2023-03-15 | End: 2024-03-21

## 2023-03-15 RX ORDER — OXYBUTYNIN CHLORIDE 5 MG/1
5 TABLET ORAL 2 TIMES DAILY
Qty: 180 TABLET | Refills: 3 | Status: SHIPPED | OUTPATIENT
Start: 2023-03-15 | End: 2023-03-17 | Stop reason: SDUPTHER

## 2023-03-17 ENCOUNTER — OFFICE VISIT (OUTPATIENT)
Dept: MEDICAL GROUP | Facility: MEDICAL CENTER | Age: 73
End: 2023-03-17
Payer: MEDICARE

## 2023-03-17 VITALS
HEIGHT: 61 IN | WEIGHT: 119.2 LBS | OXYGEN SATURATION: 97 % | HEART RATE: 60 BPM | DIASTOLIC BLOOD PRESSURE: 58 MMHG | TEMPERATURE: 97.5 F | SYSTOLIC BLOOD PRESSURE: 122 MMHG | BODY MASS INDEX: 22.51 KG/M2

## 2023-03-17 DIAGNOSIS — N18.2 CKD (CHRONIC KIDNEY DISEASE) STAGE 2, GFR 60-89 ML/MIN: Chronic | ICD-10-CM

## 2023-03-17 DIAGNOSIS — J41.1 MUCOPURULENT CHRONIC BRONCHITIS (HCC): ICD-10-CM

## 2023-03-17 DIAGNOSIS — F33.41 RECURRENT MAJOR DEPRESSIVE DISORDER, IN PARTIAL REMISSION (HCC): ICD-10-CM

## 2023-03-17 DIAGNOSIS — Z12.11 COLON CANCER SCREENING: ICD-10-CM

## 2023-03-17 DIAGNOSIS — F13.20 SEDATIVE, HYPNOTIC, OR ANXIOLYTIC DEPENDENCE (HCC): ICD-10-CM

## 2023-03-17 PROCEDURE — 99214 OFFICE O/P EST MOD 30 MIN: CPT | Performed by: FAMILY MEDICINE

## 2023-03-17 RX ORDER — ALBUTEROL SULFATE 90 UG/1
2 AEROSOL, METERED RESPIRATORY (INHALATION) EVERY 4 HOURS PRN
Qty: 1 EACH | Refills: 6 | Status: SHIPPED
Start: 2023-03-17 | End: 2023-10-31

## 2023-03-17 RX ORDER — HYDROXYZINE 50 MG/1
50 TABLET, FILM COATED ORAL 2 TIMES DAILY PRN
Qty: 90 TABLET | Refills: 3 | Status: SHIPPED | OUTPATIENT
Start: 2023-03-17 | End: 2023-10-31 | Stop reason: SDUPTHER

## 2023-03-17 RX ORDER — OXYBUTYNIN CHLORIDE 5 MG/1
5 TABLET ORAL 2 TIMES DAILY
Qty: 180 TABLET | Refills: 3 | Status: SHIPPED | OUTPATIENT
Start: 2023-03-17

## 2023-03-17 ASSESSMENT — PATIENT HEALTH QUESTIONNAIRE - PHQ9
5. POOR APPETITE OR OVEREATING: NOT AT ALL
7. TROUBLE CONCENTRATING ON THINGS, SUCH AS READING THE NEWSPAPER OR WATCHING TELEVISION: NOT AT ALL
2. FEELING DOWN, DEPRESSED, IRRITABLE, OR HOPELESS: NOT AT ALL
9. THOUGHTS THAT YOU WOULD BE BETTER OFF DEAD, OR OF HURTING YOURSELF: NOT AT ALL
4. FEELING TIRED OR HAVING LITTLE ENERGY: NOT AT ALL
8. MOVING OR SPEAKING SO SLOWLY THAT OTHER PEOPLE COULD HAVE NOTICED. OR THE OPPOSITE, BEING SO FIGETY OR RESTLESS THAT YOU HAVE BEEN MOVING AROUND A LOT MORE THAN USUAL: NOT AT ALL
3. TROUBLE FALLING OR STAYING ASLEEP OR SLEEPING TOO MUCH: NOT AT ALL
6. FEELING BAD ABOUT YOURSELF - OR THAT YOU ARE A FAILURE OR HAVE LET YOURSELF OR YOUR FAMILY DOWN: NOT AL ALL
2. FEELING DOWN, DEPRESSED, IRRITABLE, OR HOPELESS: NOT AT ALL
1. LITTLE INTEREST OR PLEASURE IN DOING THINGS: NOT AT ALL
8. MOVING OR SPEAKING SO SLOWLY THAT OTHER PEOPLE COULD HAVE NOTICED. OR THE OPPOSITE, BEING SO FIGETY OR RESTLESS THAT YOU HAVE BEEN MOVING AROUND A LOT MORE THAN USUAL: NOT AT ALL
3. TROUBLE FALLING OR STAYING ASLEEP OR SLEEPING TOO MUCH: NOT AT ALL
SUM OF ALL RESPONSES TO PHQ9 QUESTIONS 1 AND 2: 0
1. LITTLE INTEREST OR PLEASURE IN DOING THINGS: NOT AT ALL
7. TROUBLE CONCENTRATING ON THINGS, SUCH AS READING THE NEWSPAPER OR WATCHING TELEVISION: NOT AT ALL
SUM OF ALL RESPONSES TO PHQ QUESTIONS 1-9: 0
4. FEELING TIRED OR HAVING LITTLE ENERGY: NOT AT ALL
6. FEELING BAD ABOUT YOURSELF - OR THAT YOU ARE A FAILURE OR HAVE LET YOURSELF OR YOUR FAMILY DOWN: NOT AL ALL
SUM OF ALL RESPONSES TO PHQ9 QUESTIONS 1 AND 2: 0
5. POOR APPETITE OR OVEREATING: NOT AT ALL

## 2023-03-17 ASSESSMENT — FIBROSIS 4 INDEX: FIB4 SCORE: 1.79

## 2023-03-17 NOTE — PROGRESS NOTES
"Subjective:     CC: \"change in medication\"    HPI:   Janeen presents today with:    Would like enteric coated hydorxizine without that she throws them up  Finds the help with her anxiety and depression symptoms  Feels her mental health is overall well without concerns  Denies recent falls  Denies urinary leakage as long as she takes her oxybutynin   Feels breathing is stable and not having chest pain  Has sent back stool cards but no result as of yet and then tells me last colonoscopy did have polyps    Problem   Mucopurulent Chronic Bronchitis (Hcc)   Sedative, Hypnotic, Or Anxiolytic Dependence (Hcc)   Recurrent major depressive disorder, in partial remission (HCC)   Ckd (Chronic Kidney Disease) Stage 2, Gfr 60-89 Ml/Min       Current Outpatient Medications Ordered in Epic   Medication Sig Dispense Refill    hydrOXYzine HCl (ATARAX) 50 MG Tab Take 1 Tablet by mouth 2 times a day as needed for Anxiety. Please provide enteric coated tabs 90 Tablet 3    albuterol 108 (90 Base) MCG/ACT Aero Soln inhalation aerosol Inhale 2 Puffs every four hours as needed for Shortness of Breath. 1 Each 6    oxybutynin (DITROPAN) 5 MG Tab Take 1 Tablet by mouth 2 times a day. TAKE ONE TABLET BY MOUTH TWICE DAILY 180 Tablet 3    estradiol (ESTRACE) 0.5 MG tablet Take 1 Tablet by mouth every evening. 90 Tablet 3    valsartan (DIOVAN) 40 MG Tab Take 1 Tablet by mouth every day. 100 Tablet 3    baclofen (LIORESAL) 10 MG Tab Take 1 Tablet by mouth every evening. 100 Tablet 3    atorvastatin (LIPITOR) 80 MG tablet Take 1 Tablet by mouth every evening. TAKE 1 TABLET BY MOUTH ONCE DAILY IN THE EVENING 100 Tablet 3    fluticasone (FLONASE) 50 MCG/ACT nasal spray Administer 1 Spray into affected nostril(S) every day. 16 g 1    loteprednol etabonate (LOTEMAX) 0.5 % ophthalmic suspension Administer 1 Drop into both eyes every evening.      ARTIFICIAL TEAR OINTMENT OP Administer 1 Application into both eyes as needed.      Probiotic Product " "(PROBIOTIC DAILY) Cap Take 1 Capsule by mouth every evening.      Non Formulary Request Take 1 Tablet by mouth every evening. \"Focus for brain multiple vitamin\" takes 1 po tab daily      Multiple Vitamins-Minerals (PRESERVISION AREDS) Tab Take 1 Tablet by mouth every evening.      traZODone (DESYREL) 50 MG Tab Take 0.5 Tablets by mouth every evening. per 90 Tablet 1    omeprazole (PRILOSEC) 40 MG delayed-release capsule TAKE ONE CAPSULE BY MOUTH ONE TIME DAILY (Patient taking differently: Take 40 mg by mouth every evening. TAKE ONE CAPSULE BY MOUTH ONE TIME DAILY) 90 Capsule 3    celecoxib (CELEBREX) 200 MG Cap TAKE ONE CAPSULE BY MOUTH ONE TIME DAILY (Patient taking differently: Take 200 mg by mouth every evening. TAKE ONE CAPSULE BY MOUTH ONE TIME DAILY) 100 Capsule 3    fluocinonide (LIDEX) 0.05 % Cream AAA posterior scalp, neck, back, BID PRN Itching. Avoid use on face, axilla, groin. (Patient taking differently: Apply 1 Each topically 2 times a day as needed. AAA posterior scalp, neck, back, BID PRN Itching. Avoid use on face, axilla, groin.) 60 g 1    Krill Oil (OMEGA-3) 500 MG Cap Take 500 mg by mouth every evening.      Flaxseed, Linseed, (FLAXSEED OIL PO) Take 1 Tablet by mouth every evening.      sertraline (ZOLOFT) 100 MG Tab Take 1 Tab by mouth every day. (Patient taking differently: Take 100 mg by mouth every evening.) 90 Tab 3     No current Epic-ordered facility-administered medications on file.       Health Maintenance: referral for colonoscopy    ROS:  ROS see HPI    Objective:     Exam:  /58 (BP Location: Right arm, Patient Position: Sitting, BP Cuff Size: Adult)   Pulse 60   Temp 36.4 °C (97.5 °F) (Temporal)   Ht 1.549 m (5' 1\")   Wt 54.1 kg (119 lb 3.2 oz)   SpO2 97%   BMI 22.52 kg/m²  Body mass index is 22.52 kg/m².    Physical Exam  Vitals reviewed.   Constitutional:       Appearance: Normal appearance.   Cardiovascular:      Rate and Rhythm: Normal rate and regular rhythm.      " Heart sounds: Normal heart sounds.   Pulmonary:      Effort: Pulmonary effort is normal.      Breath sounds: Normal breath sounds.   Neurological:      Mental Status: She is alert. Mental status is at baseline.   Psychiatric:         Mood and Affect: Mood normal.         Behavior: Behavior normal.         Labs:       Assessment & Plan:     72 y.o. female with the following -     Problem List Items Addressed This Visit       CKD (chronic kidney disease) stage 2, GFR 60-89 ml/min (Chronic)     Repeat GFR for this year have been above 60 but below 90. Will continue to monitor renal function but seems to be tolerating medication regimen well without adverse effects.         Recurrent major depressive disorder, in partial remission (HCC) (Chronic)     Chronic, stable. Continue with current defined treatment plan: Patient doing well with hydroxyzine, baclofen, zoloft and trazodone, no concerning side effects or lab derangement will continue current management. Follow-up at least annually.         Relevant Medications    hydrOXYzine HCl (ATARAX) 50 MG Tab    Mucopurulent chronic bronchitis (HCC) (Chronic)     Chronic, stable. Continue with current defined treatment plan: Patient feels breathing is stable, declines daily inhaler but would like to have albuterol on hand, will discuss frequency of usage at next appointment. Follow-up at least annually.         Sedative, hypnotic, or anxiolytic dependence (HCC) (Chronic)     Chronic, stable. Continue with current defined treatment plan: Patient uses hydroxyzine and trazodone without complaint or concerning side effects reported. Follow-up at least annually.          Other Visit Diagnoses       Colon cancer screening        Relevant Orders    Referral to GI for Colonoscopy              Return in about 4 months (around 7/24/2023) for Annual Medicare, Lab F/U.    Please note that this dictation was created using voice recognition software. I have made every reasonable attempt to  correct obvious errors, but I expect that there are errors of grammar and possibly content that I did not discover before finalizing the note.

## 2023-03-18 NOTE — ASSESSMENT & PLAN NOTE
Chronic, stable. Continue with current defined treatment plan: Patient uses hydroxyzine and trazodone without complaint or concerning side effects reported. Follow-up at least annually.

## 2023-03-18 NOTE — ASSESSMENT & PLAN NOTE
Chronic, stable. Continue with current defined treatment plan: Patient doing well with hydroxyzine, baclofen, zoloft and trazodone, no concerning side effects or lab derangement will continue current management. Follow-up at least annually.

## 2023-03-18 NOTE — ASSESSMENT & PLAN NOTE
Repeat GFR for this year have been above 60 but below 90. Will continue to monitor renal function but seems to be tolerating medication regimen well without adverse effects.

## 2023-03-18 NOTE — ASSESSMENT & PLAN NOTE
Chronic, stable. Continue with current defined treatment plan: Patient feels breathing is stable, declines daily inhaler but would like to have albuterol on hand, will discuss frequency of usage at next appointment. Follow-up at least annually.

## 2023-04-05 ENCOUNTER — OFFICE VISIT (OUTPATIENT)
Dept: MEDICAL GROUP | Facility: MEDICAL CENTER | Age: 73
End: 2023-04-05
Payer: MEDICARE

## 2023-04-05 ENCOUNTER — HOSPITAL ENCOUNTER (OUTPATIENT)
Facility: MEDICAL CENTER | Age: 73
End: 2023-04-05
Attending: FAMILY MEDICINE
Payer: MEDICARE

## 2023-04-05 VITALS
TEMPERATURE: 97.5 F | OXYGEN SATURATION: 97 % | SYSTOLIC BLOOD PRESSURE: 114 MMHG | HEIGHT: 61 IN | BODY MASS INDEX: 21.49 KG/M2 | WEIGHT: 113.8 LBS | DIASTOLIC BLOOD PRESSURE: 68 MMHG | HEART RATE: 67 BPM

## 2023-04-05 DIAGNOSIS — R10.84 GENERALIZED ABDOMINAL PAIN: ICD-10-CM

## 2023-04-05 DIAGNOSIS — R19.7 DIARRHEA OF PRESUMED INFECTIOUS ORIGIN: ICD-10-CM

## 2023-04-05 DIAGNOSIS — L30.9 DERMATITIS: ICD-10-CM

## 2023-04-05 DIAGNOSIS — Z59.819 HOUSING INSTABILITY: ICD-10-CM

## 2023-04-05 LAB
APPEARANCE UR: CLEAR
BILIRUB UR STRIP-MCNC: NEGATIVE MG/DL
COLOR UR AUTO: YELLOW
GLUCOSE UR STRIP.AUTO-MCNC: NEGATIVE MG/DL
KETONES UR STRIP.AUTO-MCNC: NEGATIVE MG/DL
LEUKOCYTE ESTERASE UR QL STRIP.AUTO: NEGATIVE
NITRITE UR QL STRIP.AUTO: NEGATIVE
PH UR STRIP.AUTO: 5.5 [PH] (ref 5–8)
PROT UR QL STRIP: NEGATIVE MG/DL
RBC UR QL AUTO: NORMAL
SP GR UR STRIP.AUTO: 1.02
UROBILINOGEN UR STRIP-MCNC: 0.2 MG/DL

## 2023-04-05 PROCEDURE — 99214 OFFICE O/P EST MOD 30 MIN: CPT | Performed by: FAMILY MEDICINE

## 2023-04-05 PROCEDURE — 87086 URINE CULTURE/COLONY COUNT: CPT

## 2023-04-05 PROCEDURE — 81002 URINALYSIS NONAUTO W/O SCOPE: CPT | Performed by: FAMILY MEDICINE

## 2023-04-05 RX ORDER — AMOXICILLIN AND CLAVULANATE POTASSIUM 875; 125 MG/1; MG/1
1 TABLET, FILM COATED ORAL 2 TIMES DAILY
Qty: 14 TABLET | Refills: 0 | Status: SHIPPED | OUTPATIENT
Start: 2023-04-05 | End: 2023-04-12

## 2023-04-05 SDOH — ECONOMIC STABILITY - HOUSING INSECURITY: HOUSING INSTABILITY UNSPECIFIED: Z59.819

## 2023-04-05 ASSESSMENT — FIBROSIS 4 INDEX: FIB4 SCORE: 1.79

## 2023-04-05 NOTE — PROGRESS NOTES
"Subjective:     CC: \"stomach pains\"    HPI:   Janeen presents today with:    Had worsening stomach pain the last 6 days  With diarrhea  Has been having BRAT diet without effect  Feels off balance and weak  No fevers or chills  Did have some bilious emesis last 2 days  Has not taking any medication  No strange foods  No pain with urination    Left leg pain  Has known nerve pain  Having burning pain in lateral calf that comes and goes    Patient being relocated by housing authority  She had previously been told she can stay in her current location  This is the last place her and her  had lived  She tells me she has some paranoia about moving and being around strangers and she is concerned it will worsen if she has to relocate        Problem   Housing Instability       Current Outpatient Medications Ordered in Epic   Medication Sig Dispense Refill    fluocinonide (LIDEX) 0.05 % Cream Apply 1 Each topically 2 times a day as needed (rash and itching). AAA posterior scalp, neck, back, BID PRN Itching. Avoid use on face, axilla, groin. 30 g 3    amoxicillin-clavulanate (AUGMENTIN) 875-125 MG Tab Take 1 Tablet by mouth 2 times a day for 7 days. 14 Tablet 0    hydrOXYzine HCl (ATARAX) 50 MG Tab Take 1 Tablet by mouth 2 times a day as needed for Anxiety. Please provide enteric coated tabs 90 Tablet 3    albuterol 108 (90 Base) MCG/ACT Aero Soln inhalation aerosol Inhale 2 Puffs every four hours as needed for Shortness of Breath. 1 Each 6    oxybutynin (DITROPAN) 5 MG Tab Take 1 Tablet by mouth 2 times a day. TAKE ONE TABLET BY MOUTH TWICE DAILY 180 Tablet 3    estradiol (ESTRACE) 0.5 MG tablet Take 1 Tablet by mouth every evening. 90 Tablet 3    valsartan (DIOVAN) 40 MG Tab Take 1 Tablet by mouth every day. 100 Tablet 3    baclofen (LIORESAL) 10 MG Tab Take 1 Tablet by mouth every evening. 100 Tablet 3    atorvastatin (LIPITOR) 80 MG tablet Take 1 Tablet by mouth every evening. TAKE 1 TABLET BY MOUTH ONCE DAILY IN THE " "EVENING 100 Tablet 3    fluticasone (FLONASE) 50 MCG/ACT nasal spray Administer 1 Spray into affected nostril(S) every day. 16 g 1    loteprednol etabonate (LOTEMAX) 0.5 % ophthalmic suspension Administer 1 Drop into both eyes every evening.      ARTIFICIAL TEAR OINTMENT OP Administer 1 Application into both eyes as needed.      Probiotic Product (PROBIOTIC DAILY) Cap Take 1 Capsule by mouth every evening.      Non Formulary Request Take 1 Tablet by mouth every evening. \"Focus for brain multiple vitamin\" takes 1 po tab daily      Multiple Vitamins-Minerals (PRESERVISION AREDS) Tab Take 1 Tablet by mouth every evening.      traZODone (DESYREL) 50 MG Tab Take 0.5 Tablets by mouth every evening. per 90 Tablet 1    omeprazole (PRILOSEC) 40 MG delayed-release capsule TAKE ONE CAPSULE BY MOUTH ONE TIME DAILY (Patient taking differently: Take 40 mg by mouth every evening. TAKE ONE CAPSULE BY MOUTH ONE TIME DAILY) 90 Capsule 3    celecoxib (CELEBREX) 200 MG Cap TAKE ONE CAPSULE BY MOUTH ONE TIME DAILY (Patient taking differently: Take 200 mg by mouth every evening. TAKE ONE CAPSULE BY MOUTH ONE TIME DAILY) 100 Capsule 3    Krill Oil (OMEGA-3) 500 MG Cap Take 500 mg by mouth every evening.      Flaxseed, Linseed, (FLAXSEED OIL PO) Take 1 Tablet by mouth every evening.      sertraline (ZOLOFT) 100 MG Tab Take 1 Tab by mouth every day. (Patient taking differently: Take 100 mg by mouth every evening.) 90 Tab 3     No current Epic-ordered facility-administered medications on file.       Health Maintenance: acute visit    ROS:  ROS see HPI    Objective:     Exam:  /68   Pulse 67   Temp 36.4 °C (97.5 °F) (Temporal)   Ht 1.549 m (5' 1\")   Wt 51.6 kg (113 lb 12.8 oz)   SpO2 97%   BMI 21.50 kg/m²  Body mass index is 21.5 kg/m².    Physical Exam  Vitals reviewed.   Constitutional:       General: She is not in acute distress.     Appearance: Normal appearance. She is normal weight. She is ill-appearing. She is not " toxic-appearing.   HENT:      Head: Normocephalic and atraumatic.   Cardiovascular:      Rate and Rhythm: Normal rate and regular rhythm.      Heart sounds: Normal heart sounds.   Pulmonary:      Effort: Pulmonary effort is normal. No respiratory distress.      Breath sounds: Normal breath sounds.   Abdominal:      General: Bowel sounds are normal. There is no distension.      Palpations: Abdomen is soft.      Tenderness: There is abdominal tenderness (centrally). There is no guarding.   Skin:     General: Skin is warm and dry.   Neurological:      Mental Status: She is alert. Mental status is at baseline.      Gait: Gait normal.   Psychiatric:         Mood and Affect: Mood normal.         Behavior: Behavior normal.     Labs:      Assessment & Plan:     72 y.o. female with the following -     Problem List Items Addressed This Visit       Housing instability     Patient once again having decision where she can live made without involving her. Wrote letter stating concern for adverse effects to her health if she is forced to move.          Other Visit Diagnoses       Generalized abdominal pain      Will start treatment and work up  ER precautions given    Relevant Medications    amoxicillin-clavulanate (AUGMENTIN) 875-125 MG Tab    Other Relevant Orders    POCT Urinalysis (Completed)    Comp Metabolic Panel    CBC WITH DIFFERENTIAL    LIPASE    TSH WITH REFLEX TO FT4    CRP QUANTITIVE (NON-CARDIAC)    Sed Rate    URINE CULTURE(NEW)    CT-ABDOMEN-PELVIS WITH    Dermatitis        Relevant Medications    fluocinonide (LIDEX) 0.05 % Cream    Diarrhea of presumed infectious origin      Patient to continue BRAT diet  Maintain hydration  Monitor for further weight loss    Relevant Medications    amoxicillin-clavulanate (AUGMENTIN) 875-125 MG Tab    Other Relevant Orders    Comp Metabolic Panel    CBC WITH DIFFERENTIAL    LIPASE    TSH WITH REFLEX TO FT4    CRP QUANTITIVE (NON-CARDIAC)    Sed Rate    CT-ABDOMEN-PELVIS WITH                 Return if symptoms worsen or fail to improve.    Please note that this dictation was created using voice recognition software. I have made every reasonable attempt to correct obvious errors, but I expect that there are errors of grammar and possibly content that I did not discover before finalizing the note.

## 2023-04-05 NOTE — LETTER
2023      To Whom It May Concern:  RE: Ashley Ritter   1950    She has requested I reach out to you as her PCP regarding her housing situation. She would like to remain in the unit she is in and this is something she was promised previously. I understand housing space is limited. In her situation she is elderly and lives alone. This is the last place her and her  resided in before his passing. She and I are concerned that moving her to a new space will cause unneeded stress on her. She and I are concerned that this change at her age may also cause adverse effects to her fragile health. I request that this be taken into consideration in her housing assignment.      Regards,        Rick Wright D.O.

## 2023-04-06 NOTE — ASSESSMENT & PLAN NOTE
Patient once again having decision where she can live made without involving her. Wrote letter stating concern for adverse effects to her health if she is forced to move.

## 2023-04-07 ENCOUNTER — HOSPITAL ENCOUNTER (OUTPATIENT)
Dept: LAB | Facility: MEDICAL CENTER | Age: 73
End: 2023-04-07
Attending: FAMILY MEDICINE
Payer: MEDICARE

## 2023-04-07 DIAGNOSIS — R10.84 GENERALIZED ABDOMINAL PAIN: ICD-10-CM

## 2023-04-07 DIAGNOSIS — R19.7 DIARRHEA OF PRESUMED INFECTIOUS ORIGIN: ICD-10-CM

## 2023-04-07 LAB
BASOPHILS # BLD AUTO: 0.4 % (ref 0–1.8)
BASOPHILS # BLD: 0.02 K/UL (ref 0–0.12)
EOSINOPHIL # BLD AUTO: 0.1 K/UL (ref 0–0.51)
EOSINOPHIL NFR BLD: 2.2 % (ref 0–6.9)
ERYTHROCYTE [DISTWIDTH] IN BLOOD BY AUTOMATED COUNT: 43.1 FL (ref 35.9–50)
ERYTHROCYTE [SEDIMENTATION RATE] IN BLOOD BY WESTERGREN METHOD: 6 MM/HOUR (ref 0–25)
HCT VFR BLD AUTO: 41.5 % (ref 37–47)
HGB BLD-MCNC: 13.2 G/DL (ref 12–16)
IMM GRANULOCYTES # BLD AUTO: 0.01 K/UL (ref 0–0.11)
IMM GRANULOCYTES NFR BLD AUTO: 0.2 % (ref 0–0.9)
LYMPHOCYTES # BLD AUTO: 2.12 K/UL (ref 1–4.8)
LYMPHOCYTES NFR BLD: 47 % (ref 22–41)
MCH RBC QN AUTO: 28.8 PG (ref 27–33)
MCHC RBC AUTO-ENTMCNC: 31.8 G/DL (ref 33.6–35)
MCV RBC AUTO: 90.4 FL (ref 81.4–97.8)
MONOCYTES # BLD AUTO: 0.66 K/UL (ref 0–0.85)
MONOCYTES NFR BLD AUTO: 14.6 % (ref 0–13.4)
NEUTROPHILS # BLD AUTO: 1.6 K/UL (ref 2–7.15)
NEUTROPHILS NFR BLD: 35.6 % (ref 44–72)
NRBC # BLD AUTO: 0 K/UL
NRBC BLD-RTO: 0 /100 WBC
PLATELET # BLD AUTO: 184 K/UL (ref 164–446)
PMV BLD AUTO: 10.9 FL (ref 9–12.9)
RBC # BLD AUTO: 4.59 M/UL (ref 4.2–5.4)
WBC # BLD AUTO: 4.5 K/UL (ref 4.8–10.8)

## 2023-04-07 PROCEDURE — 84443 ASSAY THYROID STIM HORMONE: CPT

## 2023-04-07 PROCEDURE — 86140 C-REACTIVE PROTEIN: CPT

## 2023-04-07 PROCEDURE — 85025 COMPLETE CBC W/AUTO DIFF WBC: CPT

## 2023-04-07 PROCEDURE — 85652 RBC SED RATE AUTOMATED: CPT

## 2023-04-07 PROCEDURE — 80053 COMPREHEN METABOLIC PANEL: CPT

## 2023-04-07 PROCEDURE — 36415 COLL VENOUS BLD VENIPUNCTURE: CPT

## 2023-04-07 PROCEDURE — 83690 ASSAY OF LIPASE: CPT

## 2023-04-08 LAB
ALBUMIN SERPL BCP-MCNC: 4.5 G/DL (ref 3.2–4.9)
ALBUMIN/GLOB SERPL: 1.6 G/DL
ALP SERPL-CCNC: 60 U/L (ref 30–99)
ALT SERPL-CCNC: 14 U/L (ref 2–50)
ANION GAP SERPL CALC-SCNC: 15 MMOL/L (ref 7–16)
AST SERPL-CCNC: 20 U/L (ref 12–45)
BACTERIA UR CULT: NORMAL
BILIRUB SERPL-MCNC: 0.8 MG/DL (ref 0.1–1.5)
BUN SERPL-MCNC: 28 MG/DL (ref 8–22)
CALCIUM ALBUM COR SERPL-MCNC: 9.2 MG/DL (ref 8.5–10.5)
CALCIUM SERPL-MCNC: 9.6 MG/DL (ref 8.5–10.5)
CHLORIDE SERPL-SCNC: 107 MMOL/L (ref 96–112)
CO2 SERPL-SCNC: 23 MMOL/L (ref 20–33)
CREAT SERPL-MCNC: 0.87 MG/DL (ref 0.5–1.4)
CRP SERPL HS-MCNC: <0.3 MG/DL (ref 0–0.75)
GFR SERPLBLD CREATININE-BSD FMLA CKD-EPI: 71 ML/MIN/1.73 M 2
GLOBULIN SER CALC-MCNC: 2.9 G/DL (ref 1.9–3.5)
GLUCOSE SERPL-MCNC: 92 MG/DL (ref 65–99)
LIPASE SERPL-CCNC: 38 U/L (ref 11–82)
POTASSIUM SERPL-SCNC: 4.4 MMOL/L (ref 3.6–5.5)
PROT SERPL-MCNC: 7.4 G/DL (ref 6–8.2)
SIGNIFICANT IND 70042: NORMAL
SITE SITE: NORMAL
SODIUM SERPL-SCNC: 145 MMOL/L (ref 135–145)
SOURCE SOURCE: NORMAL
TSH SERPL DL<=0.005 MIU/L-ACNC: 1.77 UIU/ML (ref 0.38–5.33)

## 2023-04-10 ENCOUNTER — HOSPITAL ENCOUNTER (OUTPATIENT)
Dept: RADIOLOGY | Facility: MEDICAL CENTER | Age: 73
End: 2023-04-10
Attending: FAMILY MEDICINE
Payer: MEDICARE

## 2023-04-10 DIAGNOSIS — R19.7 DIARRHEA OF PRESUMED INFECTIOUS ORIGIN: ICD-10-CM

## 2023-04-10 DIAGNOSIS — R10.84 GENERALIZED ABDOMINAL PAIN: ICD-10-CM

## 2023-04-10 PROCEDURE — 74177 CT ABD & PELVIS W/CONTRAST: CPT

## 2023-04-10 PROCEDURE — 700117 HCHG RX CONTRAST REV CODE 255: Performed by: FAMILY MEDICINE

## 2023-04-10 RX ADMIN — IOHEXOL 100 ML: 350 INJECTION, SOLUTION INTRAVENOUS at 19:08

## 2023-05-05 PROBLEM — D70.9 NEUTROPHILS DECREASED (HCC): Status: ACTIVE | Noted: 2023-05-05

## 2023-05-05 PROBLEM — I70.0 ATHEROSCLEROSIS OF AORTA (HCC): Status: ACTIVE | Noted: 2023-05-05

## 2023-05-05 PROBLEM — I77.9 DISORDER OF ARTERIES AND ARTERIOLES, UNSPECIFIED (HCC): Status: ACTIVE | Noted: 2023-05-05

## 2023-05-05 PROBLEM — Z79.899 POLYPHARMACY: Status: ACTIVE | Noted: 2023-05-05

## 2023-05-05 PROBLEM — J42 CHRONIC BRONCHITIS (HCC): Status: ACTIVE | Noted: 2022-03-02

## 2023-05-05 PROBLEM — F39 MOOD DISORDER (HCC): Status: ACTIVE | Noted: 2023-05-05

## 2023-05-17 DIAGNOSIS — K21.9 GASTROESOPHAGEAL REFLUX DISEASE WITHOUT ESOPHAGITIS: Chronic | ICD-10-CM

## 2023-05-18 RX ORDER — OMEPRAZOLE 40 MG/1
CAPSULE, DELAYED RELEASE ORAL
Qty: 90 CAPSULE | Refills: 0 | Status: SHIPPED | OUTPATIENT
Start: 2023-05-18 | End: 2023-09-30

## 2023-06-02 DIAGNOSIS — M25.50 ARTHRALGIA, UNSPECIFIED JOINT: ICD-10-CM

## 2023-06-05 RX ORDER — CELECOXIB 200 MG/1
CAPSULE ORAL
Qty: 100 CAPSULE | Refills: 0 | Status: SHIPPED | OUTPATIENT
Start: 2023-06-05 | End: 2023-09-25

## 2023-09-23 DIAGNOSIS — M25.50 ARTHRALGIA, UNSPECIFIED JOINT: ICD-10-CM

## 2023-09-25 RX ORDER — CELECOXIB 200 MG/1
CAPSULE ORAL
Qty: 100 CAPSULE | Refills: 0 | Status: SHIPPED | OUTPATIENT
Start: 2023-09-25 | End: 2023-10-31 | Stop reason: SDUPTHER

## 2023-09-29 DIAGNOSIS — K21.9 GASTROESOPHAGEAL REFLUX DISEASE WITHOUT ESOPHAGITIS: Chronic | ICD-10-CM

## 2023-09-30 RX ORDER — OMEPRAZOLE 40 MG/1
CAPSULE, DELAYED RELEASE ORAL
Qty: 90 CAPSULE | Refills: 0 | Status: SHIPPED | OUTPATIENT
Start: 2023-09-30

## 2023-10-11 ENCOUNTER — TELEPHONE (OUTPATIENT)
Dept: MEDICAL GROUP | Facility: MEDICAL CENTER | Age: 73
End: 2023-10-11
Payer: MEDICARE

## 2023-10-11 DIAGNOSIS — U07.1 COVID-19: ICD-10-CM

## 2023-10-11 NOTE — TELEPHONE ENCOUNTER
1. Caller Name: PANDA, Midge Robinson                        Call Back Number: [phone]      How would the patient prefer to be contacted with a response: Phone call OK to leave a detailed message    Good afternoon Dr. Dukes,    Pt called stating that she has Covid and doesn't feel good.   Ashley said that yesterday Cassandra went to her house and told her that is no needed to go to UC/ER.  However, the only thing they're worried about they took her vitals and looked at all of her medications and they said they were just worried about the level of stroke due to her age. She would like an advice from you.    Thank you.

## 2023-10-11 NOTE — TELEPHONE ENCOUNTER
Has had 2 positive Covid tests  Believes symptoms started yesterday the 10th  She has been seen by [facility] at home, vitals reported as good  Last GFR was over 60 that was in April  Will hold Atorvastatin for 5 days she is on it  Discussed ER precautions

## 2023-10-23 DIAGNOSIS — R73.03 PREDIABETES: ICD-10-CM

## 2023-10-23 DIAGNOSIS — D70.9 NEUTROPENIA, UNSPECIFIED TYPE (HCC): ICD-10-CM

## 2023-10-23 DIAGNOSIS — I10 HYPERTENSION, UNSPECIFIED TYPE: ICD-10-CM

## 2023-10-23 RX ORDER — TRAZODONE HYDROCHLORIDE 50 MG/1
25 TABLET ORAL EVERY EVENING
Qty: 90 TABLET | Refills: 0 | Status: SHIPPED | OUTPATIENT
Start: 2023-10-23

## 2023-10-26 ENCOUNTER — APPOINTMENT (OUTPATIENT)
Dept: MEDICAL GROUP | Facility: MEDICAL CENTER | Age: 73
End: 2023-10-26
Payer: MEDICARE

## 2023-10-26 ENCOUNTER — HOSPITAL ENCOUNTER (OUTPATIENT)
Dept: LAB | Facility: MEDICAL CENTER | Age: 73
End: 2023-10-26
Attending: FAMILY MEDICINE
Payer: MEDICARE

## 2023-10-26 DIAGNOSIS — I10 HYPERTENSION, UNSPECIFIED TYPE: ICD-10-CM

## 2023-10-26 DIAGNOSIS — D70.9 NEUTROPENIA, UNSPECIFIED TYPE (HCC): ICD-10-CM

## 2023-10-26 DIAGNOSIS — R73.03 PREDIABETES: ICD-10-CM

## 2023-10-26 LAB
ALBUMIN SERPL BCP-MCNC: 4.8 G/DL (ref 3.2–4.9)
ALBUMIN/GLOB SERPL: 1.8 G/DL
ALP SERPL-CCNC: 64 U/L (ref 30–99)
ALT SERPL-CCNC: 16 U/L (ref 2–50)
ANION GAP SERPL CALC-SCNC: 12 MMOL/L (ref 7–16)
AST SERPL-CCNC: 26 U/L (ref 12–45)
BASOPHILS # BLD AUTO: 0.3 % (ref 0–1.8)
BASOPHILS # BLD: 0.02 K/UL (ref 0–0.12)
BILIRUB SERPL-MCNC: 0.9 MG/DL (ref 0.1–1.5)
BUN SERPL-MCNC: 27 MG/DL (ref 8–22)
CALCIUM ALBUM COR SERPL-MCNC: 8.8 MG/DL (ref 8.5–10.5)
CALCIUM SERPL-MCNC: 9.4 MG/DL (ref 8.5–10.5)
CHLORIDE SERPL-SCNC: 102 MMOL/L (ref 96–112)
CO2 SERPL-SCNC: 26 MMOL/L (ref 20–33)
CREAT SERPL-MCNC: 1 MG/DL (ref 0.5–1.4)
EOSINOPHIL # BLD AUTO: 0.07 K/UL (ref 0–0.51)
EOSINOPHIL NFR BLD: 1.1 % (ref 0–6.9)
ERYTHROCYTE [DISTWIDTH] IN BLOOD BY AUTOMATED COUNT: 42.6 FL (ref 35.9–50)
EST. AVERAGE GLUCOSE BLD GHB EST-MCNC: 120 MG/DL
GFR SERPLBLD CREATININE-BSD FMLA CKD-EPI: 60 ML/MIN/1.73 M 2
GLOBULIN SER CALC-MCNC: 2.7 G/DL (ref 1.9–3.5)
GLUCOSE SERPL-MCNC: 98 MG/DL (ref 65–99)
HBA1C MFR BLD: 5.8 % (ref 4–5.6)
HCT VFR BLD AUTO: 42.2 % (ref 37–47)
HGB BLD-MCNC: 13.8 G/DL (ref 12–16)
IMM GRANULOCYTES # BLD AUTO: 0.01 K/UL (ref 0–0.11)
IMM GRANULOCYTES NFR BLD AUTO: 0.2 % (ref 0–0.9)
LYMPHOCYTES # BLD AUTO: 2.81 K/UL (ref 1–4.8)
LYMPHOCYTES NFR BLD: 43.8 % (ref 22–41)
MCH RBC QN AUTO: 28.9 PG (ref 27–33)
MCHC RBC AUTO-ENTMCNC: 32.7 G/DL (ref 32.2–35.5)
MCV RBC AUTO: 88.5 FL (ref 81.4–97.8)
MONOCYTES # BLD AUTO: 0.91 K/UL (ref 0–0.85)
MONOCYTES NFR BLD AUTO: 14.2 % (ref 0–13.4)
NEUTROPHILS # BLD AUTO: 2.59 K/UL (ref 1.82–7.42)
NEUTROPHILS NFR BLD: 40.4 % (ref 44–72)
NRBC # BLD AUTO: 0 K/UL
NRBC BLD-RTO: 0 /100 WBC (ref 0–0.2)
PLATELET # BLD AUTO: 236 K/UL (ref 164–446)
PMV BLD AUTO: 10.1 FL (ref 9–12.9)
POTASSIUM SERPL-SCNC: 3.4 MMOL/L (ref 3.6–5.5)
PROT SERPL-MCNC: 7.5 G/DL (ref 6–8.2)
RBC # BLD AUTO: 4.77 M/UL (ref 4.2–5.4)
SODIUM SERPL-SCNC: 140 MMOL/L (ref 135–145)
WBC # BLD AUTO: 6.4 K/UL (ref 4.8–10.8)

## 2023-10-26 PROCEDURE — 36415 COLL VENOUS BLD VENIPUNCTURE: CPT

## 2023-10-26 PROCEDURE — 83036 HEMOGLOBIN GLYCOSYLATED A1C: CPT

## 2023-10-26 PROCEDURE — 85025 COMPLETE CBC W/AUTO DIFF WBC: CPT

## 2023-10-26 PROCEDURE — 80053 COMPREHEN METABOLIC PANEL: CPT

## 2023-10-26 RX ORDER — ONDANSETRON 4 MG/1
TABLET, ORALLY DISINTEGRATING ORAL
COMMUNITY
End: 2023-10-31

## 2023-10-26 RX ORDER — MOXIFLOXACIN 5 MG/ML
SOLUTION/ DROPS OPHTHALMIC
COMMUNITY
Start: 2023-07-31 | End: 2023-10-31

## 2023-10-26 RX ORDER — PREDNISOLONE ACETATE 10 MG/ML
SUSPENSION/ DROPS OPHTHALMIC
COMMUNITY
Start: 2023-08-08 | End: 2023-10-31

## 2023-10-31 ENCOUNTER — OFFICE VISIT (OUTPATIENT)
Dept: MEDICAL GROUP | Facility: MEDICAL CENTER | Age: 73
End: 2023-10-31
Payer: MEDICARE

## 2023-10-31 VITALS
RESPIRATION RATE: 14 BRPM | TEMPERATURE: 97.1 F | DIASTOLIC BLOOD PRESSURE: 68 MMHG | SYSTOLIC BLOOD PRESSURE: 106 MMHG | BODY MASS INDEX: 21.25 KG/M2 | OXYGEN SATURATION: 97 % | WEIGHT: 108.25 LBS | HEIGHT: 60 IN | HEART RATE: 74 BPM

## 2023-10-31 DIAGNOSIS — M19.012 ARTHRITIS OF LEFT ACROMIOCLAVICULAR JOINT: ICD-10-CM

## 2023-10-31 DIAGNOSIS — M25.512 ACUTE PAIN OF LEFT SHOULDER: ICD-10-CM

## 2023-10-31 DIAGNOSIS — R73.03 PREDIABETES: ICD-10-CM

## 2023-10-31 DIAGNOSIS — Z12.11 SCREEN FOR COLON CANCER: ICD-10-CM

## 2023-10-31 DIAGNOSIS — F41.9 ANXIETY: ICD-10-CM

## 2023-10-31 PROCEDURE — 3074F SYST BP LT 130 MM HG: CPT | Performed by: NURSE PRACTITIONER

## 2023-10-31 PROCEDURE — 99214 OFFICE O/P EST MOD 30 MIN: CPT | Performed by: NURSE PRACTITIONER

## 2023-10-31 PROCEDURE — 3078F DIAST BP <80 MM HG: CPT | Performed by: NURSE PRACTITIONER

## 2023-10-31 RX ORDER — HYDROXYZINE 50 MG/1
50 TABLET, FILM COATED ORAL 2 TIMES DAILY PRN
Qty: 90 TABLET | Refills: 3 | Status: SHIPPED | OUTPATIENT
Start: 2023-10-31

## 2023-10-31 RX ORDER — SERTRALINE HYDROCHLORIDE 100 MG/1
100 TABLET, FILM COATED ORAL EVERY EVENING
Qty: 90 TABLET | Refills: 3 | Status: SHIPPED | OUTPATIENT
Start: 2023-10-31

## 2023-10-31 RX ORDER — CELECOXIB 200 MG/1
200 CAPSULE ORAL DAILY
Qty: 100 CAPSULE | Refills: 0 | Status: SHIPPED | OUTPATIENT
Start: 2023-10-31

## 2023-10-31 ASSESSMENT — FIBROSIS 4 INDEX: FIB4 SCORE: 1.98

## 2023-10-31 NOTE — PROGRESS NOTES
Chief Complaint   Patient presents with    Arm Pain     L arm - aching pain, decreased ROM, lasting 3 days        Subjective:     HPI:     MAMIE PANDA is a 73 y.o. female here to discuss the following:    Patient of Dr. Wright    Having left shoulder pain for the last 3 days.   Had shoulder surgery in the past-  Came on all of a sudden. No fall or trauma.   She states that she does fall asleep in her chair-maybe leaning on the shoulder.   Can lift her shoulder but does not have full ROM-has pain  She is Left hand dominant.   Living alone  Tells me she has been out of her Celebrex for a few days.     Hx of shoulder arthroscopy/decompression back in 2010 with Dr. Horton  Have reviewed previous MRI back from 2010-      IMPRESSION:   1. Full thickness tear of the supraspinatus tendon with 3 cm of   retraction.   2. Partial thickness tears of the infraspinatus and subscapularis tendons.   3. Inferiorly directed subacromial enthesophyte.   4. Degenerative change of the acromioclavicular joint with inferiorly   directed osteophytes..   5. At the of the supraspinatus and infraspinatus muscles.   6. Fluid within the subacromial/subdeltoid bursa.     Have reviewed labs from 10/26/2023  -prediabetes-stable.  -potassium 3.4  -GFR-60, Creatinine 1.0-slight reduction from previous labs.  Of note she recently had COVID--was prescribed PAXLOVID on 10/11/2023 but was not able to drive and go get the Rx-she was dehydrated.    Needs refills on Hydroxyzine and Sertraline-for anxiety    ROS: : see above        Current Outpatient Medications:     celecoxib (CELEBREX) 200 MG Cap, Take 1 Capsule by mouth every day., Disp: 100 Capsule, Rfl: 0    hydrOXYzine HCl (ATARAX) 50 MG Tab, Take 1 Tablet by mouth 2 times a day as needed for Anxiety. Please provide enteric coated tabs, Disp: 90 Tablet, Rfl: 3    sertraline (ZOLOFT) 100 MG Tab, Take 1 Tablet by mouth every evening., Disp: 90 Tablet, Rfl: 3    diclofenac sodium  "(VOLTAREN) 1 % Gel, Apply 4 g of 1% gel to affected area 4 times daily as needed (maximum: 16 g per joint per day) for pain, Disp: 100 g, Rfl: 3    traZODone (DESYREL) 50 MG Tab, TAKE 1/2 TABLET BY MOUTH IN THE EVENING, Disp: 90 Tablet, Rfl: 0    omeprazole (PRILOSEC) 40 MG delayed-release capsule, TAKE ONE CAPSULE BY MOUTH ONE TIME DAILY, Disp: 90 Capsule, Rfl: 0    fluocinonide (LIDEX) 0.05 % Cream, Apply 1 Each topically 2 times a day as needed (rash and itching). AAA posterior scalp, neck, back, BID PRN Itching. Avoid use on face, axilla, groin., Disp: 30 g, Rfl: 3    oxybutynin (DITROPAN) 5 MG Tab, Take 1 Tablet by mouth 2 times a day. TAKE ONE TABLET BY MOUTH TWICE DAILY, Disp: 180 Tablet, Rfl: 3    estradiol (ESTRACE) 0.5 MG tablet, Take 1 Tablet by mouth every evening., Disp: 90 Tablet, Rfl: 3    valsartan (DIOVAN) 40 MG Tab, Take 1 Tablet by mouth every day., Disp: 100 Tablet, Rfl: 3    baclofen (LIORESAL) 10 MG Tab, Take 1 Tablet by mouth every evening., Disp: 100 Tablet, Rfl: 3    atorvastatin (LIPITOR) 80 MG tablet, Take 1 Tablet by mouth every evening. TAKE 1 TABLET BY MOUTH ONCE DAILY IN THE EVENING, Disp: 100 Tablet, Rfl: 3    fluticasone (FLONASE) 50 MCG/ACT nasal spray, Administer 1 Spray into affected nostril(S) every day., Disp: 16 g, Rfl: 1    Probiotic Product (PROBIOTIC DAILY) Cap, Take 1 Capsule by mouth every evening., Disp: , Rfl:     Non Formulary Request, Take 1 Tablet by mouth every evening. \"Focus for brain multiple vitamin\" takes 1 po tab daily, Disp: , Rfl:     Multiple Vitamins-Minerals (PRESERVISION AREDS) Tab, Take 1 Tablet by mouth 2 (two) times a day., Disp: , Rfl:     Krill Oil (OMEGA-3) 500 MG Cap, Take 500 mg by mouth every evening., Disp: , Rfl:     Flaxseed, Linseed, (FLAXSEED OIL PO), Take 1 Tablet by mouth every evening., Disp: , Rfl:     Allergies   Allergen Reactions    Other Misc Shortness of Breath     \"chemicals such as cleaning agents and fragrance\"    Aspirin      " Other reaction(s): Not available    Morphine Anaphylaxis     Feeling flush and rapid heartbeat  Other reaction(s): Not available    Soap Rash       Objective:     Vitals: /68 (BP Location: Right arm, Patient Position: Sitting, BP Cuff Size: Adult)   Pulse 74   Temp 36.2 °C (97.1 °F) (Temporal)   Resp 14   Ht 1.524 m (5')   Wt 49.1 kg (108 lb 3.9 oz)   SpO2 97%   BMI 21.14 kg/m²    General: Alert, pleasant, NAD  HEENT: Normocephalic.  Neck supple.   Respiratory: no distress, no audible wheezing, RR -WNL  Skin: Warm, dry, no rashes.  Extremities: No leg edema. No discoloration  Neurological: No tremors  Psych:  Affect/mood is normal, judgement is good, memory is intact, grooming is appropriate.    Assessment/Plan:      1. Acute pain of left shoulder  Acute-no trauma. Suspect self limiting issue.  We discussed conservative treatment.  Hold on imaging at this time.   Consider PT-    2. Arthritis of left acromioclavicular joint  Present on previous MRI  Recommend re-starting her Celebrex, trial of Voltaren.  - diclofenac sodium (VOLTAREN) 1 % Gel; Apply 4 g of 1% gel to affected area 4 times daily as needed (maximum: 16 g per joint per day) for pain  Dispense: 100 g; Refill: 3  - celecoxib (CELEBREX) 200 MG Cap; Take 1 Capsule by mouth every day.  Dispense: 100 Capsule; Refill: 0    3. Prediabetes  Stable-5.8%.    4. Screen for colon cancer  - COLOGUARD (FIT DNA)    5. Anxiety  Chronic. Continue Sertraline and Hydroxyzine.   Refills provided.   - hydrOXYzine HCl (ATARAX) 50 MG Tab; Take 1 Tablet by mouth 2 times a day as needed for Anxiety. Please provide enteric coated tabs  Dispense: 90 Tablet; Refill: 3  - sertraline (ZOLOFT) 100 MG Tab; Take 1 Tablet by mouth every evening.  Dispense: 90 Tablet; Refill: 3      Return for with PCP -December appt..          Gloria PENA

## 2023-12-01 DIAGNOSIS — M54.42 CHRONIC BILATERAL LOW BACK PAIN WITH LEFT-SIDED SCIATICA: ICD-10-CM

## 2023-12-01 DIAGNOSIS — G89.29 CHRONIC BILATERAL LOW BACK PAIN WITH LEFT-SIDED SCIATICA: ICD-10-CM

## 2023-12-04 RX ORDER — BACLOFEN 10 MG/1
10 TABLET ORAL EVERY EVENING
Qty: 100 TABLET | Refills: 3 | Status: SHIPPED | OUTPATIENT
Start: 2023-12-04

## 2023-12-14 ENCOUNTER — APPOINTMENT (OUTPATIENT)
Dept: MEDICAL GROUP | Facility: MEDICAL CENTER | Age: 73
End: 2023-12-14
Payer: MEDICARE

## 2023-12-16 ENCOUNTER — APPOINTMENT (OUTPATIENT)
Dept: RADIOLOGY | Facility: IMAGING CENTER | Age: 73
End: 2023-12-16
Attending: NURSE PRACTITIONER
Payer: MEDICARE

## 2023-12-16 ENCOUNTER — OFFICE VISIT (OUTPATIENT)
Dept: URGENT CARE | Facility: CLINIC | Age: 73
End: 2023-12-16
Payer: MEDICARE

## 2023-12-16 VITALS
TEMPERATURE: 97.2 F | OXYGEN SATURATION: 97 % | RESPIRATION RATE: 16 BRPM | HEART RATE: 57 BPM | SYSTOLIC BLOOD PRESSURE: 106 MMHG | HEIGHT: 60 IN | WEIGHT: 106.5 LBS | BODY MASS INDEX: 20.91 KG/M2 | DIASTOLIC BLOOD PRESSURE: 64 MMHG

## 2023-12-16 DIAGNOSIS — M54.50 ACUTE RIGHT-SIDED LOW BACK PAIN WITHOUT SCIATICA: ICD-10-CM

## 2023-12-16 PROCEDURE — 72100 X-RAY EXAM L-S SPINE 2/3 VWS: CPT | Mod: TC | Performed by: RADIOLOGY

## 2023-12-16 PROCEDURE — 3078F DIAST BP <80 MM HG: CPT | Performed by: NURSE PRACTITIONER

## 2023-12-16 PROCEDURE — 99214 OFFICE O/P EST MOD 30 MIN: CPT | Performed by: NURSE PRACTITIONER

## 2023-12-16 PROCEDURE — 3074F SYST BP LT 130 MM HG: CPT | Performed by: NURSE PRACTITIONER

## 2023-12-16 RX ORDER — TIZANIDINE 4 MG/1
4 TABLET ORAL EVERY 8 HOURS PRN
Qty: 15 TABLET | Refills: 0 | Status: SHIPPED | OUTPATIENT
Start: 2023-12-16

## 2023-12-16 RX ORDER — KETOROLAC TROMETHAMINE 30 MG/ML
30 INJECTION, SOLUTION INTRAMUSCULAR; INTRAVENOUS ONCE
Status: COMPLETED | OUTPATIENT
Start: 2023-12-16 | End: 2023-12-16

## 2023-12-16 RX ADMIN — KETOROLAC TROMETHAMINE 30 MG: 30 INJECTION, SOLUTION INTRAMUSCULAR; INTRAVENOUS at 17:38

## 2023-12-16 ASSESSMENT — FIBROSIS 4 INDEX: FIB4 SCORE: 1.98
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
## 2023-12-18 DIAGNOSIS — I10 HTN (HYPERTENSION), BENIGN: ICD-10-CM

## 2023-12-18 RX ORDER — VALSARTAN 40 MG/1
40 TABLET ORAL DAILY
Qty: 100 TABLET | Refills: 3 | Status: SHIPPED | OUTPATIENT
Start: 2023-12-18

## 2023-12-25 NOTE — PROGRESS NOTES
Outcome: Called pt with cost of her Myrbetriq mediation. The 90 day supply from [facility] pharmacy would be $117.50. I told pt I spoke to the staff at [facility] and they had no record of needing extra paperwork--letter/card. I asked pt if maybe it could have been a misunderstanding that she has LIS and they thought she was talking about additional help other than LIS. Per pt, it could not have been a misunderstanding as she deals with this periodically. She said it especially happens when she speaks to new employees or staff she hasn't spoken to before. She said that they even refuse to scan the medication to give a price. I asked pt again if she would like to change pharmacy. I reminded her that we can provide Uber rides to pharmacies. I told pt I can look at pharmacies in her area but she would like to switch to [facility] pharmacy. I also told her I spoke to Jael from grievance/ appeal department and she mentioned the mail RX would cost her $94 for a 90 day supply. I also told pt we can switch other maintenance prescriptions to mail orders for bigger savings. She agree and I will mail her the [place] form today. Pt just asked to transfer prescriptions in parts as she can't afford to buy all her medications at the same time. I told member I would message the MA at Dr. Garcia's office with request. I also told pt, Jael would be contacting her regarding her [facility] grievance. As for the grievance regarding the [place] [facility] , [facility] couldn't do anything as its not one of our Uber drivers. I offered to help her with a grievance with the [place] department and pt declined putting a grievance regarding the . At this moment member denies any further questions or concerns.     No (0)

## 2024-02-28 DIAGNOSIS — E78.5 DYSLIPIDEMIA: Chronic | ICD-10-CM

## 2024-02-28 NOTE — TELEPHONE ENCOUNTER
Received request via: Pharmacy    Was the patient seen in the last year in this department? Yes    Does the patient have an active prescription (recently filled or refills available) for medication(s) requested? No    Pharmacy Name: [facility]     Does the patient have group home Plus and need 100 day supply (blood pressure, diabetes and cholesterol meds only)? Yes, quantity updated to 100 days

## 2024-02-29 RX ORDER — ATORVASTATIN CALCIUM 80 MG/1
80 TABLET, FILM COATED ORAL EVERY EVENING
Qty: 100 TABLET | Refills: 3 | Status: SHIPPED | OUTPATIENT
Start: 2024-02-29

## 2024-03-21 RX ORDER — ESTRADIOL 0.5 MG/1
0.5 TABLET ORAL EVERY EVENING
Qty: 90 TABLET | Refills: 3 | Status: SHIPPED | OUTPATIENT
Start: 2024-03-21

## 2024-04-09 ENCOUNTER — OFFICE VISIT (OUTPATIENT)
Dept: MEDICAL GROUP | Facility: MEDICAL CENTER | Age: 74
End: 2024-04-09
Payer: MEDICARE

## 2024-04-09 VITALS
HEART RATE: 60 BPM | TEMPERATURE: 97.9 F | WEIGHT: 99.8 LBS | DIASTOLIC BLOOD PRESSURE: 60 MMHG | SYSTOLIC BLOOD PRESSURE: 112 MMHG | BODY MASS INDEX: 19.59 KG/M2 | HEIGHT: 60 IN | OXYGEN SATURATION: 97 % | RESPIRATION RATE: 16 BRPM

## 2024-04-09 DIAGNOSIS — R73.03 PREDIABETES: ICD-10-CM

## 2024-04-09 DIAGNOSIS — F13.20 SEDATIVE, HYPNOTIC, OR ANXIOLYTIC DEPENDENCE (HCC): ICD-10-CM

## 2024-04-09 DIAGNOSIS — M19.012 ARTHRITIS OF LEFT ACROMIOCLAVICULAR JOINT: ICD-10-CM

## 2024-04-09 DIAGNOSIS — F33.41 RECURRENT MAJOR DEPRESSIVE DISORDER, IN PARTIAL REMISSION (HCC): ICD-10-CM

## 2024-04-09 DIAGNOSIS — I10 HTN (HYPERTENSION), BENIGN: ICD-10-CM

## 2024-04-09 DIAGNOSIS — M25.512 ACUTE PAIN OF LEFT SHOULDER: ICD-10-CM

## 2024-04-09 DIAGNOSIS — D70.9 NEUTROPENIA, UNSPECIFIED TYPE (HCC): ICD-10-CM

## 2024-04-09 DIAGNOSIS — M54.50 ACUTE RIGHT-SIDED LOW BACK PAIN WITHOUT SCIATICA: ICD-10-CM

## 2024-04-09 DIAGNOSIS — F39 MOOD DISORDER (HCC): ICD-10-CM

## 2024-04-09 DIAGNOSIS — N32.81 OVERACTIVE BLADDER: ICD-10-CM

## 2024-04-09 DIAGNOSIS — L50.3 DERMATOGRAPHISM: ICD-10-CM

## 2024-04-09 DIAGNOSIS — M19.90 ARTHRITIS: ICD-10-CM

## 2024-04-09 PROCEDURE — 3074F SYST BP LT 130 MM HG: CPT | Performed by: FAMILY MEDICINE

## 2024-04-09 PROCEDURE — 3078F DIAST BP <80 MM HG: CPT | Performed by: FAMILY MEDICINE

## 2024-04-09 PROCEDURE — 99214 OFFICE O/P EST MOD 30 MIN: CPT | Performed by: FAMILY MEDICINE

## 2024-04-09 RX ORDER — HYDROXYZINE 50 MG/1
50 TABLET, FILM COATED ORAL 2 TIMES DAILY PRN
Qty: 90 TABLET | Refills: 3 | Status: SHIPPED | OUTPATIENT
Start: 2024-04-09

## 2024-04-09 RX ORDER — OXYBUTYNIN CHLORIDE 5 MG/1
5 TABLET ORAL 2 TIMES DAILY
Qty: 180 TABLET | Refills: 3 | Status: SHIPPED | OUTPATIENT
Start: 2024-04-09

## 2024-04-09 RX ORDER — SERTRALINE HYDROCHLORIDE 100 MG/1
100 TABLET, FILM COATED ORAL EVERY EVENING
Qty: 90 TABLET | Refills: 3 | Status: SHIPPED | OUTPATIENT
Start: 2024-04-09

## 2024-04-09 RX ORDER — CELECOXIB 200 MG/1
200 CAPSULE ORAL DAILY
Qty: 100 CAPSULE | Refills: 0 | Status: SHIPPED | OUTPATIENT
Start: 2024-04-09

## 2024-04-09 RX ORDER — TIZANIDINE 4 MG/1
4 TABLET ORAL EVERY 8 HOURS PRN
Qty: 15 TABLET | Refills: 0 | Status: SHIPPED | OUTPATIENT
Start: 2024-04-09

## 2024-04-09 RX ORDER — VALSARTAN 40 MG/1
40 TABLET ORAL DAILY
Qty: 100 TABLET | Refills: 3 | Status: SHIPPED | OUTPATIENT
Start: 2024-04-09

## 2024-04-09 ASSESSMENT — ANXIETY QUESTIONNAIRES
GAD7 TOTAL SCORE: 5
1. FEELING NERVOUS, ANXIOUS, OR ON EDGE: SEVERAL DAYS
2. NOT BEING ABLE TO STOP OR CONTROL WORRYING: SEVERAL DAYS
5. BEING SO RESTLESS THAT IT IS HARD TO SIT STILL: NOT AT ALL
4. TROUBLE RELAXING: SEVERAL DAYS
3. WORRYING TOO MUCH ABOUT DIFFERENT THINGS: SEVERAL DAYS
7. FEELING AFRAID AS IF SOMETHING AWFUL MIGHT HAPPEN: NOT AT ALL
6. BECOMING EASILY ANNOYED OR IRRITABLE: SEVERAL DAYS

## 2024-04-09 ASSESSMENT — FIBROSIS 4 INDEX: FIB4 SCORE: 1.98

## 2024-04-09 ASSESSMENT — PATIENT HEALTH QUESTIONNAIRE - PHQ9
SUM OF ALL RESPONSES TO PHQ QUESTIONS 1-9: 8
CLINICAL INTERPRETATION OF PHQ2 SCORE: 2
5. POOR APPETITE OR OVEREATING: 2 - MORE THAN HALF THE DAYS

## 2024-04-09 NOTE — LETTER
2024        To Whom It May Concern:  RE: Ashley Ritter   1950     She has requested I reach out to you as her PCP regarding her housing situation. She would like to remain in the unit she is in and this is something she was promised previously. I understand housing space is limited. In her situation she is elderly and lives alone. This is the last place her and her  resided in before his passing. She and I are concerned that moving her to a new space will cause unneeded stress on her. She and I are concerned that this change at her age may also cause adverse effects to her fragile health. I request that this be taken into consideration in her housing assignment.      Regards,              Rick Wright D.O.
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
## 2024-04-15 DIAGNOSIS — K21.9 GASTROESOPHAGEAL REFLUX DISEASE WITHOUT ESOPHAGITIS: Chronic | ICD-10-CM

## 2024-04-15 RX ORDER — OMEPRAZOLE 40 MG/1
CAPSULE, DELAYED RELEASE ORAL
Qty: 90 CAPSULE | Refills: 3 | Status: SHIPPED | OUTPATIENT
Start: 2024-04-15

## 2024-04-20 ENCOUNTER — HOSPITAL ENCOUNTER (OUTPATIENT)
Dept: RADIOLOGY | Facility: MEDICAL CENTER | Age: 74
End: 2024-04-20
Attending: FAMILY MEDICINE
Payer: MEDICARE

## 2024-04-20 DIAGNOSIS — M25.512 ACUTE PAIN OF LEFT SHOULDER: ICD-10-CM

## 2024-04-20 DIAGNOSIS — M19.012 ARTHRITIS OF LEFT ACROMIOCLAVICULAR JOINT: ICD-10-CM

## 2024-04-20 PROCEDURE — 73030 X-RAY EXAM OF SHOULDER: CPT | Mod: LT

## 2024-04-26 ENCOUNTER — TELEPHONE (OUTPATIENT)
Dept: HEALTH INFORMATION MANAGEMENT | Facility: OTHER | Age: 74
End: 2024-04-26

## 2024-05-21 ENCOUNTER — TELEPHONE (OUTPATIENT)
Dept: MEDICAL GROUP | Facility: MEDICAL CENTER | Age: 74
End: 2024-05-21
Payer: MEDICARE

## 2024-05-21 NOTE — TELEPHONE ENCOUNTER
Spoke with pt concerning no shows. Pt was not aware that she missed appt 5/20/24. Pt has been rescheduled for 5/28/2024. Pt states she is very sick and very sorry that she missed appt.

## 2024-05-28 ENCOUNTER — OFFICE VISIT (OUTPATIENT)
Dept: MEDICAL GROUP | Facility: MEDICAL CENTER | Age: 74
End: 2024-05-28
Payer: MEDICARE

## 2024-05-28 VITALS
SYSTOLIC BLOOD PRESSURE: 134 MMHG | RESPIRATION RATE: 16 BRPM | DIASTOLIC BLOOD PRESSURE: 60 MMHG | OXYGEN SATURATION: 97 % | HEIGHT: 60 IN | WEIGHT: 109 LBS | HEART RATE: 64 BPM | BODY MASS INDEX: 21.4 KG/M2 | TEMPERATURE: 97.6 F

## 2024-05-28 DIAGNOSIS — I10 HTN (HYPERTENSION), BENIGN: ICD-10-CM

## 2024-05-28 DIAGNOSIS — I73.9 CLAUDICATION (HCC): ICD-10-CM

## 2024-05-28 DIAGNOSIS — I70.0 ATHEROSCLEROSIS OF AORTA (HCC): ICD-10-CM

## 2024-05-28 DIAGNOSIS — E78.2 MIXED HYPERLIPIDEMIA: ICD-10-CM

## 2024-05-28 DIAGNOSIS — Z12.11 COLON CANCER SCREENING: ICD-10-CM

## 2024-05-28 DIAGNOSIS — J30.2 SEASONAL ALLERGIES: ICD-10-CM

## 2024-05-28 DIAGNOSIS — Z12.31 ENCOUNTER FOR SCREENING MAMMOGRAM FOR BREAST CANCER: ICD-10-CM

## 2024-05-28 DIAGNOSIS — M25.512 ACUTE PAIN OF LEFT SHOULDER: ICD-10-CM

## 2024-05-28 DIAGNOSIS — I77.9 DISORDER OF ARTERIES AND ARTERIOLES, UNSPECIFIED (HCC): ICD-10-CM

## 2024-05-28 DIAGNOSIS — G89.29 CHRONIC RIGHT-SIDED LOW BACK PAIN WITHOUT SCIATICA: ICD-10-CM

## 2024-05-28 DIAGNOSIS — M54.50 CHRONIC RIGHT-SIDED LOW BACK PAIN WITHOUT SCIATICA: ICD-10-CM

## 2024-05-28 DIAGNOSIS — J41.0 SIMPLE CHRONIC BRONCHITIS (HCC): ICD-10-CM

## 2024-05-28 PROCEDURE — 99214 OFFICE O/P EST MOD 30 MIN: CPT | Performed by: FAMILY MEDICINE

## 2024-05-28 PROCEDURE — 3075F SYST BP GE 130 - 139MM HG: CPT | Performed by: FAMILY MEDICINE

## 2024-05-28 PROCEDURE — 3078F DIAST BP <80 MM HG: CPT | Performed by: FAMILY MEDICINE

## 2024-05-28 RX ORDER — TIZANIDINE 4 MG/1
4 TABLET ORAL EVERY 8 HOURS PRN
Qty: 15 TABLET | Refills: 0 | Status: SHIPPED | OUTPATIENT
Start: 2024-05-28

## 2024-05-28 RX ORDER — ALBUTEROL SULFATE 90 UG/1
2 AEROSOL, METERED RESPIRATORY (INHALATION) EVERY 4 HOURS PRN
Qty: 1 EACH | Refills: 3 | Status: SHIPPED | OUTPATIENT
Start: 2024-05-28

## 2024-05-28 RX ORDER — VALSARTAN 40 MG/1
40 TABLET ORAL DAILY
Qty: 100 TABLET | Refills: 3 | Status: SHIPPED | OUTPATIENT
Start: 2024-05-28

## 2024-05-28 RX ORDER — FLUTICASONE PROPIONATE 50 MCG
1 SPRAY, SUSPENSION (ML) NASAL DAILY
Qty: 16 G | Refills: 1 | Status: SHIPPED | OUTPATIENT
Start: 2024-05-28

## 2024-05-28 ASSESSMENT — FIBROSIS 4 INDEX: FIB4 SCORE: 1.98

## 2024-05-28 NOTE — PROGRESS NOTES
"Verbal consent was acquired by the patient to use [employer] ambient listening note generation during this visit    Subjective:     CC: \"shoulder pain, leg pain, medication management\"    History of Present Illness  The patient presents for evaluation of multiple medical concerns.    The patient reports a loss of mobility in her left shoulder, a condition she attributes to arthritis. She has a history of a shoulder injury that necessitated surgical intervention. She is left-handed and struggles with arm elevation. Despite attempts at home exercises, she finds it challenging to lift her arm. She recently traveled to [place], during which she experienced difficulty walking, necessitating the use of a wheelchair. She has discontinued the use of Voltaren gel due to financial constraints. Her current medication regimen includes Celebrex, which she reports as beneficial.    The patient requires a refill of her Atarax HCL prescription.    The patient is currently on tizanidine for her back pain, which she reports as less severe than before.    The patient continues to take atorvastatin for cholesterol management.    The patient reports weight gain, currently weighing 109 pounds, a decrease from her previous weight of 97 pounds.    The patient reports leg pain, which she attributes to blockages. She recalls an incident during a flight where she experienced leg cramps and was unable to walk properly the following day. She reports slowing down and stumbling frequently.    The patient's respiratory status is satisfactory. She requires a refill of her Flonase prescription. She uses ProAir inhaler as needed.   She has a family history of arthritis.          Objective:     Exam:  /60   Pulse 64   Temp 36.4 °C (97.6 °F) (Temporal)   Resp 16   Ht 1.524 m (5')   Wt 49.4 kg (109 lb)   SpO2 97%   BMI 21.29 kg/m²  Body mass index is 21.29 kg/m².    Physical Exam  Vitals reviewed.   Constitutional:       General: She is " not in acute distress.     Appearance: Normal appearance.   HENT:      Head: Normocephalic and atraumatic.   Cardiovascular:      Rate and Rhythm: Normal rate and regular rhythm.      Heart sounds: Normal heart sounds.   Pulmonary:      Effort: Pulmonary effort is normal. No respiratory distress.      Breath sounds: Normal breath sounds.   Musculoskeletal:      Right lower leg: No edema.      Left lower leg: No edema.   Skin:     General: Skin is warm and dry.   Neurological:      Mental Status: She is alert. Mental status is at baseline.      Gait: Gait normal.   Psychiatric:         Mood and Affect: Mood normal.         Behavior: Behavior normal.               Results  Imaging  Left shoulder x-ray on April 20 showed no fractures or dislocations.      Assessment & Plan:       1. Acute pain of left shoulder  The patient's left shoulder x-ray from 04/20/2024 did not reveal any fractures or dislocations.  Patient declines follow-up with physical therapy due to the $32 co-pay per session.  The patient was advised to engage in gentle pendulum exercises and wall climbs. The application of a heating pad to the shoulder was suggested.    2. Claudication (HCC)  Chronic problem now seems a bit more noticeable.  Patient to continue atorvastatin 80 mg, aspirin is on patient's allergy list so we will have to be careful may need to refer to vascular medicine for assistance in medication decision making if evidence of blockage on ultrasound.  - US-EXTREMITY ARTERY LOWER BILAT W/SHANIA (COMBO); Future  - Lipid Profile; Future    3. Atherosclerosis of aorta (HCC)  Chronic and stable patient to continue atorvastatin 80 mg  - Lipid Profile; Future    4. Disorder of arteries and arterioles, unspecified (HCC)  Chronic and stable patient to continue atorvastatin 80 mg may need to consider antiplatelet or other blood thinner  - US-EXTREMITY ARTERY LOWER BILAT W/SHANIA (COMBO); Future  - Lipid Profile; Future    5. Mixed hyperlipidemia  Has  been since 2022 that her cholesterol was checked for some reason Medicare keeps flagging as she can have a lipid panel but once every 1800 days which makes no sense.  - Lipid Profile; Future    6. Simple chronic bronchitis (HCC)  Chronic and stable patient does use ProAir occasionally but no dyspnea or wheezing reported  - albuterol 108 (90 Base) MCG/ACT Aero Soln inhalation aerosol; Inhale 2 Puffs every four hours as needed for Shortness of Breath.  Dispense: 1 Each; Refill: 3  - PULMONARY FUNCTION TESTS -Test requested: Complete Pulmonary Function Test; Future    7. HTN (hypertension), benign  Is a chronic problem, stable with blood pressure 134/60 patient continue valsartan 40 mg and monitor annually  - valsartan (DIOVAN) 40 MG Tab; Take 1 Tablet by mouth every day.  Dispense: 100 Tablet; Refill: 3    8. Seasonal allergies  - fluticasone (FLONASE) 50 MCG/ACT nasal spray; Administer 1 Spray into affected nostril(S) every day.  Dispense: 16 g; Refill: 1    9. Chronic right-sided low back pain without sciatica  - tizanidine (ZANAFLEX) 4 MG Tab; Take 1 Tablet by mouth every 8 hours as needed (Back pain). Do not drive, work, drink alcohol or engaged in potentially hazardous activity while taking this medication.  Dispense: 15 Tablet; Refill: 0    10. Encounter for screening mammogram for breast cancer  - MA-SCREENING MAMMO BILAT W/TOMOSYNTHESIS W/CAD; Future    11. Colon cancer screening  - COLOGUARD (FIT DNA)        Return in about 3 months (around 8/28/2024), or if symptoms worsen or fail to improve, for Lab F/U, Medication F/U.      This note was created using voice recognition software (Dragon). The accuracy of the dictation is limited by the abilities of the software. I have reviewed the note prior to signing, however some errors in grammar and context are still possible. If you have any questions related to this note please do not hesitate to contact our office.

## 2024-06-08 NOTE — ANESTHESIA TIME REPORT
Anesthesia Start and Stop Event Times     Date Time Event    8/10/2022 1206 Ready for Procedure     1224 Anesthesia Start     1254 Anesthesia Stop        Responsible Staff  08/10/22    Name Role Begin End    Tobey Gansert, M.D. Anesth 1224 1254        Overtime Reason:  no overtime (within assigned shift)    Comments:                                                       He would need a new refraction/exam

## 2024-06-26 ENCOUNTER — APPOINTMENT (OUTPATIENT)
Dept: RADIOLOGY | Facility: MEDICAL CENTER | Age: 74
End: 2024-06-26
Attending: FAMILY MEDICINE
Payer: MEDICARE

## 2024-07-30 ENCOUNTER — HOSPITAL ENCOUNTER (EMERGENCY)
Facility: MEDICAL CENTER | Age: 74
End: 2024-07-30
Attending: STUDENT IN AN ORGANIZED HEALTH CARE EDUCATION/TRAINING PROGRAM
Payer: MEDICARE

## 2024-07-30 ENCOUNTER — PHARMACY VISIT (OUTPATIENT)
Dept: PHARMACY | Facility: MEDICAL CENTER | Age: 74
End: 2024-07-30
Payer: COMMERCIAL

## 2024-07-30 VITALS
WEIGHT: 108.25 LBS | DIASTOLIC BLOOD PRESSURE: 74 MMHG | BODY MASS INDEX: 21.25 KG/M2 | RESPIRATION RATE: 17 BRPM | OXYGEN SATURATION: 97 % | SYSTOLIC BLOOD PRESSURE: 175 MMHG | TEMPERATURE: 97.2 F | HEART RATE: 59 BPM | HEIGHT: 60 IN
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
## 2024-07-30 PROCEDURE — 700102 HCHG RX REV CODE 250 W/ 637 OVERRIDE(OP): Performed by: STUDENT IN AN ORGANIZED HEALTH CARE EDUCATION/TRAINING PROGRAM

## 2024-07-30 PROCEDURE — A9270 NON-COVERED ITEM OR SERVICE: HCPCS | Performed by: STUDENT IN AN ORGANIZED HEALTH CARE EDUCATION/TRAINING PROGRAM

## 2024-07-30 PROCEDURE — 700111 HCHG RX REV CODE 636 W/ 250 OVERRIDE (IP): Performed by: STUDENT IN AN ORGANIZED HEALTH CARE EDUCATION/TRAINING PROGRAM

## 2024-07-30 PROCEDURE — 90715 TDAP VACCINE 7 YRS/> IM: CPT | Performed by: STUDENT IN AN ORGANIZED HEALTH CARE EDUCATION/TRAINING PROGRAM

## 2024-07-30 PROCEDURE — 90471 IMMUNIZATION ADMIN: CPT

## 2024-07-30 PROCEDURE — RXMED WILLOW AMBULATORY MEDICATION CHARGE: Performed by: STUDENT IN AN ORGANIZED HEALTH CARE EDUCATION/TRAINING PROGRAM

## 2024-07-30 PROCEDURE — 99284 EMERGENCY DEPT VISIT MOD MDM: CPT

## 2024-07-30 RX ADMIN — CLOSTRIDIUM TETANI TOXOID ANTIGEN (FORMALDEHYDE INACTIVATED), CORYNEBACTERIUM DIPHTHERIAE TOXOID ANTIGEN (FORMALDEHYDE INACTIVATED), BORDETELLA PERTUSSIS TOXOID ANTIGEN (GLUTARALDEHYDE INACTIVATED), BORDETELLA PERTUSSIS FILAMENTOUS HEMAGGLUTININ ANTIGEN (FORMALDEHYDE INACTIVATED), BORDETELLA PERTUSSIS PERTACTIN ANTIGEN, AND BORDETELLA PERTUSSIS FIMBRIAE 2/3 ANTIGEN 0.5 ML: 5; 2; 2.5; 5; 3; 5 INJECTION, SUSPENSION INTRAMUSCULAR at 02:27

## 2024-07-30 RX ADMIN — AMOXICILLIN AND CLAVULANATE POTASSIUM 1 TABLET: 875; 125 TABLET, FILM COATED ORAL at 02:27

## 2024-07-30 ASSESSMENT — PAIN DESCRIPTION - PAIN TYPE: TYPE: ACUTE PAIN

## 2024-07-30 ASSESSMENT — FIBROSIS 4 INDEX: FIB4 SCORE: 2.01

## 2024-07-30 NOTE — DISCHARGE INSTRUCTIONS
Review of fever, vomiting uncontrolled pain or arm swelling you should return to emergency room.  We have given you prescription for antibiotics which she should take as directed.  We have updated your tetanus shot.
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
## 2024-07-30 NOTE — ED TRIAGE NOTES
Chief Complaint   Patient presents with    Cat Bite     CAT SCRATCH   BP (!) 154/64   Pulse (!) 58   Temp 36.2 °C (97.1 °F) (Temporal)   Resp 17   Ht 1.524 m (5')   Wt 49.1 kg (108 lb 3.9 oz)   SpO2 97%   BMI 21.14 kg/m²     Pt reports that she was scrated earlier tonight by a feral cat. Pt reports that the cat has been coming to her house for several years, however tonight the cat was injured and scratched her arm in self defense.     Pt reports that the cat is not vaccinated. Small set of abrasions noted to the RUE over the hand and distal forearm.     Pt is AAOx4 GCS 15.

## 2024-09-16 RX ORDER — TRAZODONE HYDROCHLORIDE 50 MG/1
25 TABLET, FILM COATED ORAL EVERY EVENING
Qty: 100 TABLET | Refills: 0 | Status: SHIPPED | OUTPATIENT
Start: 2024-09-16

## 2024-09-16 NOTE — TELEPHONE ENCOUNTER
Received request via: Pharmacy    Was the patient seen in the last year in this department? Yes    Does the patient have an active prescription (recently filled or refills available) for medication(s) requested? No    [facility]     Does the patient have shelter Plus and need 100-day supply? (This applies to ALL medications) Yes, quantity updated to 100 days

## 2024-12-05 DIAGNOSIS — M54.42 CHRONIC BILATERAL LOW BACK PAIN WITH LEFT-SIDED SCIATICA: ICD-10-CM

## 2024-12-05 DIAGNOSIS — G89.29 CHRONIC RIGHT-SIDED LOW BACK PAIN WITHOUT SCIATICA: ICD-10-CM

## 2024-12-05 DIAGNOSIS — G89.29 CHRONIC BILATERAL LOW BACK PAIN WITH LEFT-SIDED SCIATICA: ICD-10-CM

## 2024-12-05 DIAGNOSIS — M54.50 CHRONIC RIGHT-SIDED LOW BACK PAIN WITHOUT SCIATICA: ICD-10-CM

## 2024-12-05 RX ORDER — BACLOFEN 10 MG/1
10 TABLET ORAL EVERY EVENING
Qty: 100 TABLET | Refills: 3 | Status: SHIPPED | OUTPATIENT
Start: 2024-12-05

## 2024-12-05 NOTE — TELEPHONE ENCOUNTER
Received request via: Pharmacy    Was the patient seen in the last year in this department? Yes    Does the patient have an active prescription (recently filled or refills available) for medication(s) requested? No    Pharmacy Name:   [id number] - [place], [place] - [address]  [address] [facility] [address]  Phone: [phone] Fax: [phone]     Does the patient have custodial Plus and need 100-day supply? (This applies to ALL medications) Yes, quantity updated to 100 days

## 2025-01-03 DIAGNOSIS — M54.50 CHRONIC RIGHT-SIDED LOW BACK PAIN WITHOUT SCIATICA: ICD-10-CM

## 2025-01-03 DIAGNOSIS — G89.29 CHRONIC RIGHT-SIDED LOW BACK PAIN WITHOUT SCIATICA: ICD-10-CM

## 2025-01-04 NOTE — TELEPHONE ENCOUNTER
Received request via: Pharmacy    Was the patient seen in the last year in this department? Yes    Does the patient have an active prescription (recently filled or refills available) for medication(s) requested? No    Pharmacy Name:   [facility] - [place], [place] - [address]  [address] [facility] [address]  Phone: [phone] Fax: [phone]      Does the patient have shelter Plus and need 100-day supply? (This applies to ALL medications) Yes, quantity updated to 100 days

## 2025-01-14 DIAGNOSIS — M19.012 ARTHRITIS OF LEFT ACROMIOCLAVICULAR JOINT: ICD-10-CM

## 2025-01-14 NOTE — TELEPHONE ENCOUNTER
Received request via: Pharmacy    Was the patient seen in the last year in this department? Yes    Does the patient have an active prescription (recently filled or refills available) for medication(s) requested? No    Pharmacy Name:   [id number] - [place], [place] - [address]  [address] [facility] [address]  Phone: [phone] Fax: [phone]      Does the patient have halfway Plus and need 100-day supply? (This applies to ALL medications) Yes, quantity updated to 100 days

## 2025-01-15 RX ORDER — CELECOXIB 200 MG/1
200 CAPSULE ORAL DAILY
Qty: 100 CAPSULE | Refills: 3 | Status: SHIPPED | OUTPATIENT
Start: 2025-01-15

## 2025-02-13 DIAGNOSIS — G89.29 CHRONIC RIGHT-SIDED LOW BACK PAIN WITHOUT SCIATICA: ICD-10-CM

## 2025-02-13 DIAGNOSIS — E78.5 DYSLIPIDEMIA: Chronic | ICD-10-CM

## 2025-02-13 DIAGNOSIS — M54.50 CHRONIC RIGHT-SIDED LOW BACK PAIN WITHOUT SCIATICA: ICD-10-CM

## 2025-02-13 DIAGNOSIS — M19.012 ARTHRITIS OF LEFT ACROMIOCLAVICULAR JOINT: ICD-10-CM

## 2025-02-14 RX ORDER — ATORVASTATIN CALCIUM 80 MG/1
80 TABLET, FILM COATED ORAL EVERY EVENING
Qty: 100 TABLET | Refills: 3 | Status: SHIPPED | OUTPATIENT
Start: 2025-02-14

## 2025-02-27 ENCOUNTER — APPOINTMENT (OUTPATIENT)
Dept: MEDICAL GROUP | Facility: MEDICAL CENTER | Age: 75
End: 2025-02-27
Payer: MEDICARE

## 2025-03-03 ENCOUNTER — APPOINTMENT (OUTPATIENT)
Dept: MEDICAL GROUP | Facility: MEDICAL CENTER | Age: 75
End: 2025-03-03
Payer: MEDICARE

## 2025-03-04 ENCOUNTER — TELEPHONE (OUTPATIENT)
Dept: MEDICAL GROUP | Facility: MEDICAL CENTER | Age: 75
End: 2025-03-04
Payer: MEDICARE

## 2025-03-04 NOTE — TELEPHONE ENCOUNTER
Spoke to pt regarding most recent no show with PCP on 3/3/25 with Dr. Wright. Pt was unaware that that she had this appt. New appt made for pt for 3/21/25 with PCP.

## 2025-03-24 ENCOUNTER — TELEPHONE (OUTPATIENT)
Dept: MEDICAL GROUP | Facility: MEDICAL CENTER | Age: 75
End: 2025-03-24
Payer: MEDICARE

## 2025-03-24 NOTE — TELEPHONE ENCOUNTER
Reached out an left a voicemail concerning most recent no show with PCP on 3/21/2025. Pt has a total of 3 no shows with office for the year of 2025 and is at dismissal. Pt must call to speak with Site Leadership and is not allow to schedule. I have spoken with pt in the past concerning no shows.

## 2025-03-27 DIAGNOSIS — L30.9 DERMATITIS: ICD-10-CM

## 2025-03-27 RX ORDER — ESTRADIOL 0.5 MG/1
0.5 TABLET ORAL EVERY EVENING
Qty: 90 TABLET | Refills: 0 | Status: SHIPPED | OUTPATIENT
Start: 2025-03-27

## 2025-03-27 RX ORDER — FLUOCINONIDE 0.5 MG/G
CREAM TOPICAL
Qty: 30 G | Refills: 0 | Status: SHIPPED | OUTPATIENT
Start: 2025-03-27

## 2025-03-27 NOTE — TELEPHONE ENCOUNTER
Received request via: Pharmacy    Was the patient seen in the last year in this department? Yes    Does the patient have an active prescription (recently filled or refills available) for medication(s) requested? No    Pharmacy Name:  [facility] - [place], [place] - [address]     Does the patient have halfway Plus and need 100-day supply? (This applies to ALL medications) Yes, quantity updated to 100 days

## 2025-03-28 ENCOUNTER — TELEPHONE (OUTPATIENT)
Dept: MEDICAL GROUP | Facility: PHYSICIAN GROUP | Age: 75
End: 2025-03-28
Payer: MEDICARE

## 2025-03-28 NOTE — TELEPHONE ENCOUNTER
Covering , Called patient to discuss NS. Patient advised she lost SCP coverage and was experiencing paranoia due to this. She feels she is declining in her health and not sure why she can't remember her appt. She is now back using [employer] to schedule rides to make her appts.  warned this would be the last exception due to PSL being out of office for final say. Will have patient call to schedule, and round with front staff to call to confirm appt.

## 2025-04-02 ENCOUNTER — APPOINTMENT (OUTPATIENT)
Dept: MEDICAL GROUP | Facility: MEDICAL CENTER | Age: 75
End: 2025-04-02
Payer: MEDICARE

## 2025-04-15 ENCOUNTER — TELEPHONE (OUTPATIENT)
Dept: HEALTH INFORMATION MANAGEMENT | Facility: OTHER | Age: 75
End: 2025-04-15
Payer: MEDICARE

## 2025-05-16 DIAGNOSIS — F33.41 RECURRENT MAJOR DEPRESSIVE DISORDER, IN PARTIAL REMISSION (HCC): ICD-10-CM

## 2025-05-16 DIAGNOSIS — F39 MOOD DISORDER (HCC): ICD-10-CM

## 2025-05-16 DIAGNOSIS — J30.2 SEASONAL ALLERGIES: ICD-10-CM

## 2025-05-19 RX ORDER — FLUTICASONE PROPIONATE 50 MCG
SPRAY, SUSPENSION (ML) NASAL
Qty: 16 G | Refills: 0 | Status: SHIPPED | OUTPATIENT
Start: 2025-05-19

## 2025-05-19 RX ORDER — SERTRALINE HYDROCHLORIDE 100 MG/1
100 TABLET, FILM COATED ORAL EVERY EVENING
Qty: 100 TABLET | Refills: 3 | Status: SHIPPED | OUTPATIENT
Start: 2025-05-19

## 2025-05-30 ENCOUNTER — PATIENT MESSAGE (OUTPATIENT)
Dept: HEALTH INFORMATION MANAGEMENT | Facility: OTHER | Age: 75
End: 2025-05-30

## 2025-05-30 ENCOUNTER — PATIENT OUTREACH (OUTPATIENT)
Dept: HEALTH INFORMATION MANAGEMENT | Facility: OTHER | Age: 75
End: 2025-05-30
Payer: MEDICARE

## 2025-05-30 DIAGNOSIS — I10 HYPERTENSION, UNSPECIFIED TYPE: Primary | ICD-10-CM

## 2025-05-30 SDOH — ECONOMIC STABILITY: HOUSING INSECURITY: AT ANY TIME IN THE PAST 12 MONTHS, WERE YOU HOMELESS OR LIVING IN A SHELTER (INCLUDING NOW)?: NO

## 2025-05-30 SDOH — HEALTH STABILITY: MENTAL HEALTH: HOW OFTEN DO YOU HAVE A DRINK CONTAINING ALCOHOL?: 2-4 TIMES A MONTH

## 2025-05-30 SDOH — SOCIAL STABILITY: SOCIAL INSECURITY
WITHIN THE LAST YEAR, HAVE YOU BEEN KICKED, HIT, SLAPPED, OR OTHERWISE PHYSICALLY HURT BY YOUR PARTNER OR EX-PARTNER?: NO

## 2025-05-30 SDOH — SOCIAL STABILITY: SOCIAL INSECURITY: ARE YOU MARRIED, WIDOWED, DIVORCED, SEPARATED, NEVER MARRIED, OR LIVING WITH A PARTNER?: WIDOWED

## 2025-05-30 SDOH — SOCIAL STABILITY: SOCIAL NETWORK: HOW OFTEN DO YOU GET TOGETHER WITH FRIENDS OR RELATIVES?: THREE TIMES A WEEK

## 2025-05-30 SDOH — HEALTH STABILITY: MENTAL HEALTH: HOW OFTEN DO YOU HAVE SIX OR MORE DRINKS ON ONE OCCASION?: NEVER

## 2025-05-30 SDOH — SOCIAL STABILITY: SOCIAL INSECURITY
WITHIN THE LAST YEAR, HAVE YOU BEEN RAPED OR FORCED TO HAVE ANY KIND OF SEXUAL ACTIVITY BY YOUR PARTNER OR EX-PARTNER?: NO

## 2025-05-30 SDOH — SOCIAL STABILITY: SOCIAL NETWORK
DO YOU BELONG TO ANY CLUBS OR ORGANIZATIONS SUCH AS CHURCH GROUPS, UNIONS, FRATERNAL OR ATHLETIC GROUPS, OR SCHOOL GROUPS?: NO

## 2025-05-30 SDOH — HEALTH STABILITY: MENTAL HEALTH: HOW MANY DRINKS CONTAINING ALCOHOL DO YOU HAVE ON A TYPICAL DAY WHEN YOU ARE DRINKING?: 1 OR 2

## 2025-05-30 SDOH — ECONOMIC STABILITY: TRANSPORTATION INSECURITY: IN THE PAST 12 MONTHS, HAS LACK OF TRANSPORTATION KEPT YOU FROM MEDICAL APPOINTMENTS OR FROM GETTING MEDICATIONS?: YES

## 2025-05-30 SDOH — SOCIAL STABILITY: SOCIAL INSECURITY: WITHIN THE LAST YEAR, HAVE YOU BEEN AFRAID OF YOUR PARTNER OR EX-PARTNER?: NO

## 2025-05-30 SDOH — ECONOMIC STABILITY: FOOD INSECURITY: WITHIN THE PAST 12 MONTHS, THE FOOD YOU BOUGHT JUST DIDN'T LAST AND YOU DIDN'T HAVE MONEY TO GET MORE.: OFTEN TRUE

## 2025-05-30 SDOH — ECONOMIC STABILITY: INCOME INSECURITY: IN THE PAST 12 MONTHS HAS THE ELECTRIC, GAS, OIL, OR WATER COMPANY THREATENED TO SHUT OFF SERVICES IN YOUR HOME?: NO

## 2025-05-30 SDOH — SOCIAL STABILITY: SOCIAL NETWORK
IN A TYPICAL WEEK, HOW MANY TIMES DO YOU TALK ON THE PHONE WITH FAMILY, FRIENDS, OR NEIGHBORS?: MORE THAN THREE TIMES A WEEK

## 2025-05-30 SDOH — SOCIAL STABILITY: SOCIAL INSECURITY: WITHIN THE LAST YEAR, HAVE YOU BEEN HUMILIATED OR EMOTIONALLY ABUSED IN OTHER WAYS BY YOUR PARTNER OR EX-PARTNER?: NO

## 2025-05-30 SDOH — ECONOMIC STABILITY: HOUSING INSECURITY: IN THE PAST 12 MONTHS, HOW MANY TIMES HAVE YOU MOVED WHERE YOU WERE LIVING?: 0

## 2025-05-30 SDOH — HEALTH STABILITY: PHYSICAL HEALTH
HOW OFTEN DO YOU NEED TO HAVE SOMEONE HELP YOU WHEN YOU READ INSTRUCTIONS, PAMPHLETS, OR OTHER WRITTEN MATERIAL FROM YOUR DOCTOR OR PHARMACY?: SOMETIMES

## 2025-05-30 SDOH — ECONOMIC STABILITY: FOOD INSECURITY: WITHIN THE PAST 12 MONTHS, YOU WORRIED THAT YOUR FOOD WOULD RUN OUT BEFORE YOU GOT THE MONEY TO BUY MORE.: OFTEN TRUE

## 2025-05-30 SDOH — HEALTH STABILITY: PHYSICAL HEALTH: ON AVERAGE, HOW MANY DAYS PER WEEK DO YOU ENGAGE IN MODERATE TO STRENUOUS EXERCISE (LIKE A BRISK WALK)?: 4 DAYS

## 2025-05-30 SDOH — ECONOMIC STABILITY: FOOD INSECURITY: HOW HARD IS IT FOR YOU TO PAY FOR THE VERY BASICS LIKE FOOD, HOUSING, MEDICAL CARE, AND HEATING?: SOMEWHAT HARD

## 2025-05-30 SDOH — SOCIAL STABILITY: SOCIAL NETWORK: HOW OFTEN DO YOU ATTEND CHURCH OR RELIGIOUS SERVICES?: NEVER

## 2025-05-30 SDOH — ECONOMIC STABILITY: HOUSING INSECURITY: IN THE LAST 12 MONTHS, WAS THERE A TIME WHEN YOU WERE NOT ABLE TO PAY THE MORTGAGE OR RENT ON TIME?: NO

## 2025-05-30 SDOH — SOCIAL STABILITY: SOCIAL NETWORK: HOW OFTEN DO YOU ATTEND MEETINGS OF THE CLUBS OR ORGANIZATIONS YOU BELONG TO?: NEVER

## 2025-05-30 SDOH — HEALTH STABILITY: PHYSICAL HEALTH: ON AVERAGE, HOW MANY MINUTES DO YOU ENGAGE IN EXERCISE AT THIS LEVEL?: 20 MIN

## 2025-05-30 ASSESSMENT — LIFESTYLE VARIABLES
AUDIT-C TOTAL SCORE: 2
SKIP TO QUESTIONS 9-10: 1

## 2025-05-30 ASSESSMENT — ACTIVITIES OF DAILY LIVING (ADL): LACK_OF_TRANSPORTATION: YES

## 2025-05-30 NOTE — PROGRESS NOTES
Community Health Worker Follow-Up    Reason for outreach: CHW called thew pt to introduce the PCM program.    CHW Interventions: This CHW called the pt and discussed th PCM program. CHW and the pt discussed the benefits of the program and the benefits to the pt. This CHW and the pt completed the [facility] and this CHW will send out resources for transportation and for Food pantry resources. Lashaun was having troubles getting into her MyChart. This CHW helped the pt reset her password so she would be able to regain access.    Specific Resources Provided:  Housing/Shelter: n/a  Transportation: ACCESS to Healthcare, Taxi bucks card,[employer] rides and Senior ride options, 1 bus pass  Food: List pof local pantries in the [facility] area, list of home food deliveries including [employer]  Financial: n/a  Social Supports: n/a  Other: Bundle message    Plan: Pt accepted the program and is set for enrollment on 6/19 @2pm with PCM nurse Simona. This CHW sent out all resources. CHW also sent the [employer] messageCHW will mfollow up with the pt in 2 weeks to make sure she received all resources in the mail.

## 2025-06-04 ENCOUNTER — PATIENT OUTREACH (OUTPATIENT)
Dept: HEALTH INFORMATION MANAGEMENT | Facility: OTHER | Age: 75
End: 2025-06-04
Payer: MEDICARE

## 2025-06-04 DIAGNOSIS — I10 HYPERTENSION, UNSPECIFIED TYPE: Primary | ICD-10-CM

## 2025-06-09 NOTE — TELEPHONE ENCOUNTER
ESTABLISHED PATIENT PRE-VISIT PLANNING     Phone Number Called: [phone] (home)   Call outcome: Spoke to patient regarding message below.  Message: Appointment scheduled with Dr. Wright, Thursday, 7/14/22, check-in: 12:45 PM, [address], [place].    Patient was contacted to complete PVP.     Note: Patient will not be contacted if there is no indication to call.     1.  Reviewed notes from the last few office visits within the medical group: Yes    2.  If any orders were placed at last visit or intended to be done for this visit (i.e. 6 mos follow-up), do we have Results/Consult Notes?         •  Labs - Labs ordered (by Dr. Myers, same office as PCP Dr. Wright), NOT completed. Patient advised to complete. Patient states will not be able to complete prior to appointment with  this upcoming Thursday, 7/14/2022.   -Fasting Labs: 8-10 hours  Note: If patient appointment is for lab review and patient did not complete labs, check with provider if OK to reschedule patient until labs completed.       •  Imaging - Imaging ordered, completed and results are in chart.    -MAMMO: Final Result    -(DEXA) Bone Density: Final Result         •  Referrals - Referral ordered, patient has NOT been seen. Called and spoke to patient during Pre-Visit Planning and advised her referrals are ready for scheduling. [facility] Scheduling Teams: [phone].     -Physiatry: Referral Status Incomplete     -Neurology: Under Appointment Desk Tab, there's a cancelled appointment originally scheduled 6/30/2022 with Provider Tam Marinelli M.D. Referral Status Authorized.      -Care Management: Please reference Progress Notes by Josefa Akhtar-, in patient's chart. Referral Status Authorized.     -Audiology(Ordered by -Same med group as PCP Dr. Wright): Called and spoke to Dr. Shar Colin's office. Per their staff, patient no showed appointments for: 05/04/2022 & 06/09/22. Patient has not been seen.  Patient called and rescheduled his 6/10 appointment for October 8th with Jeff - requested to only see a male practioner.   Referral Status Authorized.     -General Surgery: Called and spoke to [facility]. Per their office, patient has scheduled appointment with Dr. Holger Farris on 7/28/2022 @ 2:15 PM. Referral Status Authorized.      3. Is this appointment scheduled as a Hospital Follow-Up? No    4.  Immunizations were updated in Epic using Reconcile Outside Information activity? Yes    5.  Patient is due for the following Health Maintenance Topics:   Health Maintenance Due   Topic Date Due   • Annual Pulmonary Function Test / Spirometry  Never done       6.  AHA (Pulse8) form printed for Provider? No, patient does not have any open alerts COMPLIANT

## 2025-06-12 DIAGNOSIS — G89.29 CHRONIC RIGHT-SIDED LOW BACK PAIN WITHOUT SCIATICA: ICD-10-CM

## 2025-06-12 DIAGNOSIS — M54.50 CHRONIC RIGHT-SIDED LOW BACK PAIN WITHOUT SCIATICA: ICD-10-CM

## 2025-06-12 DIAGNOSIS — K21.9 GASTROESOPHAGEAL REFLUX DISEASE WITHOUT ESOPHAGITIS: Chronic | ICD-10-CM

## 2025-06-12 DIAGNOSIS — N32.81 OVERACTIVE BLADDER: ICD-10-CM

## 2025-06-13 RX ORDER — OXYBUTYNIN CHLORIDE 5 MG/1
5 TABLET ORAL 2 TIMES DAILY
Qty: 200 TABLET | Refills: 3 | Status: SHIPPED | OUTPATIENT
Start: 2025-06-13

## 2025-06-13 RX ORDER — OMEPRAZOLE 40 MG/1
40 CAPSULE, DELAYED RELEASE ORAL DAILY
Qty: 100 CAPSULE | Refills: 3 | Status: SHIPPED | OUTPATIENT
Start: 2025-06-13

## 2025-06-13 NOTE — TELEPHONE ENCOUNTER
Received request via: Pharmacy    Was the patient seen in the last year in this department? No last seen on 05/28/2024    Does the patient have an active prescription (recently filled or refills available) for medication(s) requested? No    Pharmacy Name: [id number] - [place], [place] - [address]     Does the patient have FDC Plus and need 100-day supply? (This applies to ALL medications) Yes, quantity updated to 100 days

## 2025-06-16 ENCOUNTER — PATIENT OUTREACH (OUTPATIENT)
Dept: HEALTH INFORMATION MANAGEMENT | Facility: OTHER | Age: 75
End: 2025-06-16
Payer: MEDICARE

## 2025-06-16 DIAGNOSIS — I10 HYPERTENSION, UNSPECIFIED TYPE: Primary | ICD-10-CM

## 2025-06-16 NOTE — PROGRESS NOTES
Community Health Worker Follow-Up    Reason for outreach: CHW called the pt to follow up on resources    CHW Interventions: CHW called the pt to follow up on resources. Pt stated she has not gotten anything as of yet but they have mail problems sometimes. Pts phone number was wrong and this CHW corrected the phone number and now the new number is in her chart.  CHW let the pt know that I would call again in one week.    Specific Resources Provided:  Housing/Shelter: n/a  Transportation: n/a  Food: n/a  Financial: n/a  Social Supports: n/a  Other: n/a    Plan: This CHW will call the pt again in 1 week.

## 2025-06-19 ENCOUNTER — PATIENT OUTREACH (OUTPATIENT)
Dept: HEALTH INFORMATION MANAGEMENT | Facility: OTHER | Age: 75
End: 2025-06-19
Payer: MEDICARE

## 2025-06-19 DIAGNOSIS — Z91.81 RISK FOR FALLS: ICD-10-CM

## 2025-06-19 DIAGNOSIS — I10 HYPERTENSION, UNSPECIFIED TYPE: Primary | ICD-10-CM

## 2025-06-19 DIAGNOSIS — E78.5 DYSLIPIDEMIA: ICD-10-CM

## 2025-06-19 DIAGNOSIS — R73.03 PREDIABETES: ICD-10-CM

## 2025-06-19 ASSESSMENT — PATIENT HEALTH QUESTIONNAIRE - PHQ9
SUM OF ALL RESPONSES TO PHQ QUESTIONS 1-9: 4
CLINICAL INTERPRETATION OF PHQ2 SCORE: 1
5. POOR APPETITE OR OVEREATING: 0 - NOT AT ALL
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
## 2025-07-10 ENCOUNTER — PATIENT OUTREACH (OUTPATIENT)
Dept: HEALTH INFORMATION MANAGEMENT | Facility: OTHER | Age: 75
End: 2025-07-10
Payer: MEDICARE

## 2025-07-10 DIAGNOSIS — I10 HYPERTENSION, UNSPECIFIED TYPE: Primary | ICD-10-CM

## 2025-07-10 NOTE — PROGRESS NOTES
CHW tried calling the pt to follow up on resources. Pt did not answer and this CHW could not leave a VM.  Community Health Worker Follow-Up    Reason for outreach: CHW called the pt to follow up with resources.    CHW Interventions: This CHW called the pt to follow up with resources sent. The pt stated she did get the resources in the mail and was able to understand and use. Pt and this CHW discussed the pt last appointment that she had an did not make it to. This CHW sent the pt to the   to discuss the next appointment.    Specific Resources Provided:  Housing/Shelter: n/a  Transportation: n/a  Food: n/a  Financial: n/a  Social Supports: n/a  Other: n/a    Plan: CHW will follow up next week.

## 2025-07-21 ENCOUNTER — PATIENT OUTREACH (OUTPATIENT)
Dept: HEALTH INFORMATION MANAGEMENT | Facility: OTHER | Age: 75
End: 2025-07-21
Payer: MEDICARE

## 2025-07-21 DIAGNOSIS — I10 HYPERTENSION, UNSPECIFIED TYPE: Primary | ICD-10-CM

## 2025-07-21 NOTE — PROGRESS NOTES
CHW tried calling the pt to discuss her getting an appointment now that the  has opened up the schedule for the pt. 7/21  Community Health Worker Follow-Up    Reason for outreach: CHW called the pt to discuss her making another appointment with her PCP.    CHW Interventions: This CHW called the pt because this CHW spoke with the  about the pt needing another appointment. Pt had been blocked due to missed appointments. This CHW sent the pt to the PCP MA to schedule the appt. This CHW will discuss with the PCM nurse when she returns.    Specific Resources Provided:  Housing/Shelter: n/a  Transportation: n/a  Food: n/a  Financial: n/a  Social Supports: n/a  Other: n/a    Plan: CHW will follow as needed.

## 2025-07-25 ENCOUNTER — OFFICE VISIT (OUTPATIENT)
Dept: MEDICAL GROUP | Facility: MEDICAL CENTER | Age: 75
End: 2025-07-25
Payer: MEDICARE

## 2025-07-25 ENCOUNTER — HOSPITAL ENCOUNTER (OUTPATIENT)
Facility: MEDICAL CENTER | Age: 75
End: 2025-07-25
Attending: FAMILY MEDICINE
Payer: MEDICARE

## 2025-07-25 VITALS
TEMPERATURE: 97.1 F | HEART RATE: 61 BPM | HEIGHT: 60 IN | RESPIRATION RATE: 12 BRPM | WEIGHT: 115.6 LBS | OXYGEN SATURATION: 98 % | SYSTOLIC BLOOD PRESSURE: 154 MMHG | DIASTOLIC BLOOD PRESSURE: 62 MMHG | BODY MASS INDEX: 22.7 KG/M2

## 2025-07-25 DIAGNOSIS — M19.90 ARTHRITIS: ICD-10-CM

## 2025-07-25 DIAGNOSIS — E55.9 VITAMIN D DEFICIENCY: ICD-10-CM

## 2025-07-25 DIAGNOSIS — F13.20 SEDATIVE, HYPNOTIC, OR ANXIOLYTIC DEPENDENCE (HCC): ICD-10-CM

## 2025-07-25 DIAGNOSIS — Z12.31 ENCOUNTER FOR SCREENING MAMMOGRAM FOR MALIGNANT NEOPLASM OF BREAST: ICD-10-CM

## 2025-07-25 DIAGNOSIS — J30.2 SEASONAL ALLERGIES: ICD-10-CM

## 2025-07-25 DIAGNOSIS — R41.89 COGNITIVE DECLINE: ICD-10-CM

## 2025-07-25 DIAGNOSIS — I10 HYPERTENSION, UNSPECIFIED TYPE: ICD-10-CM

## 2025-07-25 DIAGNOSIS — E78.5 DYSLIPIDEMIA: ICD-10-CM

## 2025-07-25 DIAGNOSIS — Z79.899 MEDICATION MANAGEMENT: ICD-10-CM

## 2025-07-25 DIAGNOSIS — R29.6 MULTIPLE FALLS: ICD-10-CM

## 2025-07-25 DIAGNOSIS — J42 CHRONIC BRONCHITIS, UNSPECIFIED CHRONIC BRONCHITIS TYPE (HCC): ICD-10-CM

## 2025-07-25 DIAGNOSIS — R73.03 PREDIABETES: ICD-10-CM

## 2025-07-25 DIAGNOSIS — R54 FRAIL ELDERLY: ICD-10-CM

## 2025-07-25 PROCEDURE — 80307 DRUG TEST PRSMV CHEM ANLYZR: CPT

## 2025-07-25 RX ORDER — FLUTICASONE PROPIONATE 50 MCG
1 SPRAY, SUSPENSION (ML) NASAL DAILY
Qty: 16 G | Refills: 0 | Status: SHIPPED | OUTPATIENT
Start: 2025-07-25

## 2025-07-25 RX ORDER — HYDROCODONE BITARTRATE AND ACETAMINOPHEN 5; 325 MG/1; MG/1
1 TABLET ORAL EVERY 8 HOURS PRN
Qty: 21 TABLET | Refills: 0 | Status: SHIPPED | OUTPATIENT
Start: 2025-07-25 | End: 2025-08-01

## 2025-07-25 RX ORDER — FLUTICASONE PROPIONATE AND SALMETEROL 100; 50 UG/1; UG/1
1 POWDER RESPIRATORY (INHALATION) EVERY 12 HOURS
Qty: 3 EACH | Refills: 3 | Status: SHIPPED | OUTPATIENT
Start: 2025-07-25

## 2025-07-25 RX ORDER — TRAZODONE HYDROCHLORIDE 50 MG/1
25 TABLET ORAL EVERY EVENING
Qty: 100 TABLET | Refills: 0 | Status: SHIPPED | OUTPATIENT
Start: 2025-07-25

## 2025-07-25 ASSESSMENT — FIBROSIS 4 INDEX: FIB4 SCORE: 2.04

## 2025-07-25 NOTE — PROGRESS NOTES
"Verbal consent was acquired by the patient to use [employer] ambient listening note generation during this visit    Subjective:     CC: \"back and neck pain, memory concerns, chronic conditions\"    History of Present Illness  The patient presents for evaluation of severe back pain, memory issues, and breathing difficulties.    Severe Back Pain and Neck Swelling  She reports experiencing severe back pain and neck swelling, the cause of which is unknown to her. She is seeking medication for her back pain, such as hydrocodone or oxycodone. She has been using a cane for mobility but finds it insufficient. She also has a rollator, which she finds helpful for walks but difficult to transport in her car. She recalls attending a balance therapy session in the past, which she found beneficial. She has not had any falls during her walks. She continues to take Celebrex 200 mg for pain management.  - Onset: Unknown.  - Location: Back and neck.  - Character: Severe pain and swelling.  - Alleviating/Aggravating Factors: Seeking hydrocodone or oxycodone; using a cane and rollator; Celebrex 200 mg for pain management.  - Severity: Severe pain requiring medication.    Memory Issues  She has been experiencing memory issues, which have led to missed appointments and getting lost while driving. She is still driving but is cautious. She is requesting a letter for [facility] [employer] to secure a full-time aid and to remain in her current unit due to her memory issues. She has difficulty getting in and out of the tub and uses a shower chair. She has a history of traumatic brain injury.  - Onset: Not specified.  - Character: Memory issues leading to missed appointments and getting lost.  - Severity: Significant enough to request a full-time aid and affect daily activities.    Breathing Difficulties  Her breathing difficulties have worsened, and she has been using albuterol as needed. She also takes Benadryl for allergies, which she finds helpful. " She has COPD, which is also worsening. She is interested in trying Advair.  - Onset: Worsening.  - Character: Breathing difficulties.  - Alleviating/Aggravating Factors: Albuterol as needed; Benadryl for allergies; interested in trying Advair.  - Severity: Worsening COPD.    She is currently taking half a tablet of trazodone for sleep and a probiotic.    Alcohol: She consumes alcohol occasionally, about every two to three months.  Sleep: She takes half a tablet of trazodone for sleep.    FAMILY HISTORY  Her father fell and broke his hip at the age of 76.          Objective:     Exam:  BP (!) 154/62   Pulse 61   Temp 36.2 °C (97.1 °F) (Temporal)   Resp 12   Ht 1.524 m (5')   Wt 52.4 kg (115 lb 9.6 oz)   SpO2 98%   BMI 22.58 kg/m²  Body mass index is 22.58 kg/m².    Physical Exam    Physical Exam  Vitals reviewed.   Constitutional:       General: She is not in acute distress.     Appearance: Normal appearance.   HENT:      Head: Normocephalic and atraumatic.   Pulmonary:      Effort: Pulmonary effort is normal. No respiratory distress.   Skin:     General: Skin is warm and dry.   Neurological:      Mental Status: She is alert. Mental status is at baseline.      Gait: Gait abnormal (presented in wheel chair).   Psychiatric:         Behavior: Behavior normal.           Results        Assessment & Plan:       1. Arthritis  - URINE DRUG SCREEN; Future  - Consent for Opiate Prescription  - HYDROcodone-acetaminophen (NORCO) 5-325 MG Tab per tablet; Take 1 Tablet by mouth every 8 hours as needed (severe pain) for up to 7 days.  Dispense: 21 Tablet; Refill: 0    2. Cognitive decline  - CBC WITHOUT DIFFERENTIAL; Future  - Comp Metabolic Panel; Future  - HEMOGLOBIN A1C; Future  - TSH WITH REFLEX TO FT4; Future  - VITAMIN B12; Future  - VITAMIN B1; Future  - Referral to Home Health    3. Chronic bronchitis, unspecified chronic bronchitis type (HCC)  - CBC WITHOUT DIFFERENTIAL; Future  - Comp Metabolic Panel; Future  -  "fluticasone-salmeterol (ADVAIR) 100-50 MCG/ACT AEROSOL POWDER, BREATH ACTIVATED; Inhale 1 Puff every 12 hours.  Dispense: 3 Each; Refill: 3    4. Seasonal allergies  - fluticasone (FLONASE) 50 MCG/ACT nasal spray; Administer 1 Spray into affected nostril(S) every day.  Dispense: 16 g; Refill: 0    5. Multiple falls  - DME Walker  - Patient identified as fall risk.  Appropriate orders and counseling given.  - CBC WITHOUT DIFFERENTIAL; Future  - Comp Metabolic Panel; Future  - VITAMIN D,25 HYDROXY (DEFICIENCY); Future  - Referral to Home Health    6. Frail elderly  - CBC WITHOUT DIFFERENTIAL; Future  - Comp Metabolic Panel; Future  - TSH WITH REFLEX TO FT4; Future  - Referral to Home Health    7. Dyslipidemia  - Comp Metabolic Panel; Future  - Lipid Profile; Future    8. Hypertension, unspecified type  - Comp Metabolic Panel; Future  - TSH WITH REFLEX TO FT4; Future    9. Prediabetes  - Comp Metabolic Panel; Future  - HEMOGLOBIN A1C; Future    10. Vitamin D deficiency  - VITAMIN D,25 HYDROXY (DEFICIENCY); Future    11. Encounter for screening mammogram for malignant neoplasm of breast  - MA-SCREENING MAMMO BILAT W/TOMOSYNTHESIS W/CAD; Future    12. Sedative, hypnotic, or anxiolytic dependence (HCC)  - traZODone (DESYREL) 50 MG Tab; Take 0.5 Tablets by mouth every evening.  Dispense: 100 Tablet; Refill: 0    13. Medication management  - URINE DRUG SCREEN; Future  - Consent for Opiate Prescription  - HYDROcodone-acetaminophen (NORCO) 5-325 MG Tab per tablet; Take 1 Tablet by mouth every 8 hours as needed (severe pain) for up to 7 days.  Dispense: 21 Tablet; Refill: 0  - Referral to Home Health    Patient understands this prescription is a controlled substance which is potentially habit-forming and its use is regulated by the [employer]. We also discussed the new \"black box\" warning regarding the lethal combination of opioids and benzodiazepines. Refills are subject to terms of a controlled substance agreement and patient has " an updated one on file. Most recent UDS is appropriate. Any refill requires an office visit. Narcotics may have adverse effects and the risks of addiction, accidental overdose and death were emphasized. Provided prescriptions for the next three months.      Assessment & Plan  1. Back pain: Severe.  - Reports severe back pain, potentially due to arthritis and recent falls.  - Discussed the risks and benefits of pain medication; a small quantity will be prescribed for severe pain episodes.  - Continue current regimen of Celebrex 200 mg and baclofen.  - Urine drug screen to be conducted today.    2. Memory issues: Significant.  - Reports significant memory issues, including forgetting appointments and getting lost while driving.  - Letter will be provided for [facility] [employer] to secure a full-time aid and to allow continued stay in the current unit.  - Advised to stop driving and use [employer] for health appointments.    3. Breathing difficulties: Worsening.  - Reports worsening breathing difficulties and increased use of albuterol.  - Advair inhaler will be prescribed for twice-daily use; instructed to rinse mouth after each use to prevent thrush.  - Continue monitoring and adjust treatment as necessary.    4. Allergies.  - Currently taking Benadryl, which is not recommended due to potential exacerbation of dementia symptoms.  - Advised to switch to a non-drowsy allergy medication such as Claritin, Zyrtec, or Allegra.  - Refill for Flonase will be provided.    5. Fall risk: Increased.  - Reports increased falls and unsteadiness while walking.  - A front-wheeled standard walker will be ordered to provide more stability.  - Home health services will be arranged to conduct an assessment and potentially provide physical therapy exercises for fall prevention and strength enhancement.    6. Health maintenance.  - Due for a mammogram.  - Advised to schedule the mammogram.  - Lab work will be ordered to monitor current medications and  overall health status.    Follow-up  - A follow-up visit is scheduled in 1 month to review lab results and ensure all medications are taken as prescribed.         Return in about 6 weeks (around 9/5/2025), or if symptoms worsen or fail to improve.    Billing : secondary to the complexity of this patient's illnesses and their interactions.  See assessment and plan above for the comprehensive evaluation and management of the patient's acute and chronic concerns.  As the patient's PCP, I am the continued focal point for all health care services for the patient's needs and ongoing subsequent medical care.  All problems listed were discussed during the office visit, medications were evaluated and complexities were discussed, as well as plan for the future.     This note was created using voice recognition software (Dragon). The accuracy of the dictation is limited by the abilities of the software. I have reviewed the note prior to signing, however some errors in grammar and context are still possible. If you have any questions related to this note please do not hesitate to contact our office.            Face to Face Note  -  Durable Medical Equipment    Rick Wright D.O. - NPI: [id number]  I certify that this patient is under my care and that they had a durable medical equipment(DME)face to face encounter by myself that meets the physician DME face-to-face encounter requirements with this patient on:    Date of encounter:   Patient:                    MRN:                       YOB: 2024  Janeen Ritter  [id number]  1950     The encounter with the patient was in whole, or in part, for the following medical condition, which is the primary reason for durable medical equipment:  COPD and Other - falls, cognitive decline    I certify that, based on my findings, the following durable medical equipment is medically necessary:  Walkers.      My Clinical findings support the need for  the above equipment due to:  Abnormal Gait    Supporting Symptoms: weakness, multiple falls    If patient feels more short of breath, they can go up to 6 liters per minute and contact healthcare provider.        Face to Face Supporting Documentation - Home Health    The encounter with this patient was in whole or in part the primary reason for home health admission.    Date of encounter:   Patient:                    MRN:                       YOB: 2024  Janeen Ritter  [id number]  1950     Home health to see patient for:  Skilled Nursing care for assessment, interventions & education, Registered dietitian consult, Medical social work consult, Home health aide, Physical Therapy evaluation and treatment, Occupational therapy evaluation and treatment, and Speech Language Pathology evaluation and treatment    Skilled need for:  Exacerbation of Chronic Disease State cognitive decline, tiredness, multiple falls,     Skilled nursing interventions to include:  Comment: PRN    Homebound status evidenced by:  Need the aid of supportive devices such as crutches, canes, wheelchairs or walkers or Needs the assistance of another person in order to leave the home. Leaving home requires a considerable and taxing effort. There is a normal inability to leave the home.    Community Physician to provide follow up care: Rick Wright D.O.     Optional Interventions? No      I certify the face to face encounter for this home health care referral meets the CMS requirements and the encounter/clinical assessment with the patient was, in whole, or in part, for the medical condition(s) listed above, which is the primary reason for home health care. Based on my clinical findings: the service(s) are medically necessary, support the need for home health care, and the homebound criteria are met.  I certify that this patient has had a face to face encounter by myself.  Rick Wright D.O. - NPI:  [id number]

## 2025-07-25 NOTE — LETTER
2025        To Whom It May Concern:  RE: Ashley Ritter   1950     She has requested I reach out to you as her PCP regarding her housing situation. She would like to remain in the unit she is in and this is something she was promised previously. I understand housing space is limited. In her situation she is elderly and lives alone. This is the last place her and her  resided in before his passing. She and I are concerned that moving her to a new space will cause unneeded stress on her. She and I are concerned that this change at her age may also cause adverse effects to her fragile health. I request that this be taken into consideration in her housing assignment. She also is now working with a live-in aid which is becoming more necessary for her overall health and safety.        Regards,            Rick Wright D.O.

## 2025-07-27 LAB
AMPHET CTO UR CFM-MCNC: NEGATIVE NG/ML
BARBITURATES CTO UR CFM-MCNC: NEGATIVE NG/ML
BENZODIAZ CTO UR CFM-MCNC: NEGATIVE NG/ML
CANNABINOIDS CTO UR CFM-MCNC: NEGATIVE NG/ML
COCAINE CTO UR CFM-MCNC: NEGATIVE NG/ML
CREAT UR-MCNC: 208.7 MG/DL (ref 20–400)
DRUG COMMENT 753798: NORMAL
METHADONE CTO UR CFM-MCNC: NEGATIVE NG/ML
OPIATES CTO UR CFM-MCNC: NEGATIVE NG/ML
PCP CTO UR CFM-MCNC: NEGATIVE NG/ML
PROPOXYPH CTO UR CFM-MCNC: NEGATIVE NG/ML

## 2025-07-28 ENCOUNTER — RESULTS FOLLOW-UP (OUTPATIENT)
Dept: MEDICAL GROUP | Facility: MEDICAL CENTER | Age: 75
End: 2025-07-28
Payer: MEDICARE

## 2025-07-28 ENCOUNTER — HOME HEALTH ADMISSION (OUTPATIENT)
Dept: HOME HEALTH SERVICES | Facility: HOME HEALTHCARE | Age: 75
End: 2025-07-28
Payer: MEDICARE

## 2025-07-29 ENCOUNTER — PATIENT OUTREACH (OUTPATIENT)
Dept: HEALTH INFORMATION MANAGEMENT | Facility: OTHER | Age: 75
End: 2025-07-29
Payer: MEDICARE

## 2025-07-29 DIAGNOSIS — E78.5 DYSLIPIDEMIA: ICD-10-CM

## 2025-07-29 DIAGNOSIS — Z91.81 RISK FOR FALLS: ICD-10-CM

## 2025-07-29 DIAGNOSIS — I10 HYPERTENSION, UNSPECIFIED TYPE: Primary | ICD-10-CM

## 2025-07-29 DIAGNOSIS — R73.03 PREDIABETES: ICD-10-CM

## 2025-07-29 PROCEDURE — 99490 CHRNC CARE MGMT STAFF 1ST 20: CPT | Performed by: FAMILY MEDICINE

## 2025-07-30 ENCOUNTER — TELEPHONE (OUTPATIENT)
Dept: HOME HEALTH SERVICES | Facility: HOME HEALTHCARE | Age: 75
End: 2025-07-30
Payer: MEDICARE

## 2025-07-30 NOTE — TELEPHONE ENCOUNTER
This [facility] called and spoke with patient regarding their Home Health referral.  I communicated that we do have their referral and are currently waiting for an open time slot to get their admission visit scheduled.  I asked if they were okay waiting to be called back within the next few days or in the event that a spot opens up sooner, or if they wanted their referral sent to a different Home Health agency.  They voiced their understanding of the delay and asked that their referral stay with [facility] at this time.  I requested a later start of care date for 8/2/25 per their request.

## 2025-07-31 ENCOUNTER — TELEPHONE (OUTPATIENT)
Dept: HOME HEALTH SERVICES | Facility: HOME HEALTHCARE | Age: 75
End: 2025-07-31
Payer: MEDICARE

## 2025-07-31 NOTE — TELEPHONE ENCOUNTER
This Home Health referral has been forwarded to [facility] and accepted.  Patient was contacted by nursing supervisor to discuss transfer of care.

## 2025-07-31 NOTE — TELEPHONE ENCOUNTER
Called [facility] and left message regarding intention to send referral to another agency within [facility] network that has immediate availibility. Left office number to call if she has any questions or concerns.

## (undated) DEVICE — PACK MINOR BASIN - (2EA/CA)

## (undated) DEVICE — REMOTE CONTROL

## (undated) DEVICE — SPONGE GAUZESTER. 2X2 4-PL - (2/PK 50PK/BX 30BX/CS)

## (undated) DEVICE — SUCTION INSTRUMENT YANKAUER BULBOUS TIP W/O VENT (50EA/CA)

## (undated) DEVICE — SENSOR OXIMETER ADULT SPO2 RD SET (20EA/BX)

## (undated) DEVICE — GLOVE SZ 7 BIOGEL PI MICRO - PF LF (50PR/BX 4BX/CA)

## (undated) DEVICE — SODIUM CHL IRRIGATION 0.9% 1000ML (12EA/CA)

## (undated) DEVICE — NEEDLE NON SAFETY 25 GA X 1 1/2 IN HYPO (100EA/BX)

## (undated) DEVICE — DRESSING TRANSPARENT FILM TEGADERM 4 X 4.75" (50EA/BX)"

## (undated) DEVICE — SET LEADWIRE 5 LEAD BEDSIDE DISPOSABLE ECG (1SET OF 5/EA)

## (undated) DEVICE — SET EXTENSION WITH 2 PORTS (48EA/CA) ***PART #2C8610 IS A SUBSTITUTE*****

## (undated) DEVICE — TOWEL STOP TIMEOUT SAFETY FLAG (40EA/CA)

## (undated) DEVICE — SLEEVE, VASO, THIGH, MED

## (undated) DEVICE — ELECTRODE DUAL RETURN W/ CORD - (50/PK)

## (undated) DEVICE — CHLORAPREP 26 ML APPLICATOR - ORANGE TINT(25/CA)

## (undated) DEVICE — LACTATED RINGERS INJ 1000 ML - (14EA/CA 60CA/PF)

## (undated) DEVICE — GOWN WARMING STANDARD FLEX - (30/CA)

## (undated) DEVICE — SHEET PEDIATRIC LAPAROTOMY - (10/CA)

## (undated) DEVICE — ARMREST CRADLE FOAM - (2PR/PK 12PR/CA)

## (undated) DEVICE — TUBING CLEARLINK DUO-VENT - C-FLO (48EA/CA)

## (undated) DEVICE — DRAPE LARGE 3 QUARTER - (20/CA)

## (undated) DEVICE — Device

## (undated) DEVICE — DRAPE 36X28IN RAD CARM BND BG - (25/CA) O

## (undated) DEVICE — SUTURE GENERAL

## (undated) DEVICE — GLOVE BIOGEL PI INDICATOR SZ 7.5 SURGICAL PF LF -(50/BX 4BX/CA)

## (undated) DEVICE — BLADE SURGICAL #11 - (50/BX)

## (undated) DEVICE — CANISTER SUCTION 3000ML MECHANICAL FILTER AUTO SHUTOFF MEDI-VAC NONSTERILE LF DISP  (40EA/CA)

## (undated) DEVICE — LEAD IMPLANT KIT

## (undated) DEVICE — CLOSURE SKIN STRIP 1/2 X 4 IN - (STERI STRIP) (50/BX 4BX/CA)

## (undated) DEVICE — DRESSING TRANSPARENT FILM TEGADERM 2.375 X 2.75"  (100EA/BX)"

## (undated) DEVICE — STERI STRIP COMPOUND BENZOIN - TINCTURE 0.6ML WITH APPLICATOR (40EA/BX)